# Patient Record
Sex: FEMALE | Race: BLACK OR AFRICAN AMERICAN | Employment: FULL TIME | URBAN - METROPOLITAN AREA
[De-identification: names, ages, dates, MRNs, and addresses within clinical notes are randomized per-mention and may not be internally consistent; named-entity substitution may affect disease eponyms.]

---

## 2020-03-27 ENCOUNTER — VIRTUAL VISIT (OUTPATIENT)
Dept: SURGERY | Age: 45
End: 2020-03-27

## 2020-03-27 DIAGNOSIS — I10 ESSENTIAL HYPERTENSION: ICD-10-CM

## 2020-03-27 DIAGNOSIS — E66.01 MORBID OBESITY (HCC): Primary | ICD-10-CM

## 2020-03-27 DIAGNOSIS — K21.9 GASTROESOPHAGEAL REFLUX DISEASE, ESOPHAGITIS PRESENCE NOT SPECIFIED: ICD-10-CM

## 2020-03-27 NOTE — PROGRESS NOTES
Consent:  Mariajose Glover. Chasity Cristina  and/or her healthcare decision maker is aware that this patient-initiated Telehealth encounter is a billable service, with coverage as determined by her insurance carrier. She is aware that she may receive a bill and has provided verbal consent to proceed: Yes    Services were provided through a video synchronous discussion virtually to substitute for in-person clinic visit. Pursuant to the emergency declaration under the [address] [place] [address], [facility] waiver authority and the [employer] Act, this Virtual  Visit was conducted, with patient's consent, to reduce the patient's risk of exposure to COVID-19 and provide continuity of care for an established patient. Provider location while conducting visit: in the office   Patient location: Home  Other person participating in the telehealth services: None    This virtual visit was conducted via interactive/real-time audio/video using [employer]. Me  Visit start: 1100  Visit end: 6170      Consultation for Bariatric Surgery    Sharda Newton is a 45 y.o. female who comes into the office today for initial consultation for the surgical options for the treatment of morbid obesity. The patient initially identified obesity at the age of  34 and at age 27 weighed 100lbs. Passed 200lbs in 1996. In \"2006 tubes tied and started Depo shot after this is when her weight shot up\". She has tried a variety of unsupervised weight-loss attempts including self imposed and exercise, drinking lots of water, OTC weight loss medications, PCP, but has yet to meet with lasting success. Maximum weight lost on a diet is about 10 lbs, but that the weight loss always seems to return. Today, the patient is  Height: 5' 4\" (162.6 cm) tall, Weight: 144.2 kg (318 lb) lbs for a Body mass index is 54.58 kg/m².   It is due to the patient's severe obesity, which is further complicated by hypertension, GERD and weight related arthopathies  that the patient is now seeking out bariatric surgery. Past Medical History:   Diagnosis Date    Anxiety and depression     Back pain     Constipation     GERD (gastroesophageal reflux disease)     HTN (hypertension)     Knee pain     Morbid obesity (HCC)     OA (osteoarthritis)     of knees       History reviewed. No pertinent surgical history. Allergies   Allergen Reactions    Lisinopril Other (comments)       Social History     Tobacco Use    Smoking status: Never Smoker    Smokeless tobacco: Never Used   Substance Use Topics    Alcohol use: Not on file    Drug use: Not on file       History reviewed. No pertinent family history. No family status information on file. Current Outpatient Medications on File Prior to Visit   Medication Sig Dispense Refill    ALPRAZolam (XANAX) 0.25 mg tablet Take 1 tablet twice a day by oral route as needed for 30 days.  sodium phosphate (Enema) 19-7 gram/118 mL enema use as directed on bottle      amLODIPine (NORVASC) 10 mg tablet Take 1 tablet every day by oral route.  atenoloL-chlorthalidone (TENORETIC) 100-25 mg per tablet Take 1 tablet every day by oral route in the morning.  buPROPion SR (WELLBUTRIN SR) 100 mg SR tablet Take 1 tablet twice a day by oral route for 30 days.  busPIRone (BUSPAR) 10 mg tablet Take 1 tablet twice a day by oral route for 30 days.  cloNIDine HCL (CATAPRES) 0.1 mg tablet Take 1 tablet every day by oral route at dinner.  diclofenac (VOLTAREN) 1 % gel apply 2 grams to affected area four times a day      linaCLOtide (Linzess) 290 mcg cap capsule Take 1 capsule every day by oral route.  pantoprazole (PROTONIX) 40 mg tablet Take 1 tablet every day by oral route.       terconazole (TERAZOL 3) 80 mg vaginal suppository       zolpidem (AMBIEN) 5 mg tablet take 1 tablet by mouth at bedtime if needed      doxycycline (VIBRA-TABS) 100 mg tablet       fluconazole (DIFLUCAN) 150 mg tablet        No current facility-administered medications on file prior to visit. Review of Systems:  Positive in BOLD    CONST: Fever, weight loss, fatigue or chills  GI: Nausea, vomiting, abdominal pain, change in bowel habits, constipation, hematochezia, melena, and GERD   INTEG: Dermatitis, abnormal moles  HEENT: Recent changes in vision, vertigo, epistaxis, dysphagia and hoarseness  CV: Chest pain, palpitations, HTN, edema and varicosities  RESP: Cough, shortness of breath, wheezing, hemoptysis, snoring and reactive airway disease  : Hematuria, dysuria, frequency, urgency, nocturia and stress urinary incontinence   MS: Weakness, joint pain and arthritis  ENDO: Diabetes, thyroid disease, polyuria, polydipsia, polyphagia, poor wound healing, heat intolerance, cold intolerance  LYMPH/HEME: Anemia, bruising and history of blood transfusions  NEURO: Dizziness, headache, fainting, seizures and stroke  PSYCH: Anxiety and depression      Physical Exam    Visit Vitals  Ht 5' 4\" (1.626 m)   Wt 144.2 kg (318 lb)   BMI 54.58 kg/m²       Physical Exam  Vitals signs reviewed. Constitutional:       General: She is not in acute distress. Appearance: She is obese. She is not ill-appearing. HENT:      Head: Normocephalic and atraumatic. Neck:      Musculoskeletal: Normal range of motion. Pulmonary:      Effort: Pulmonary effort is normal.   Neurological:      General: No focal deficit present. Mental Status: She is alert and oriented to person, place, and time. Psychiatric:         Mood and Affect: Mood and affect normal.       Impression:    Annia Potts is a 45 y.o. female who is suffering from morbid obesity with a BMI of 54.58  and comorbidities including hypertension, GERD and weight related arthopathies  who would benefit from bariatric surgery. We have had an extensive discussion with regard to the risks, benefits and likely outcomes of the operation. We've discussed the restrictive and malabsorptive nature of the gastric bypass and compared and contrasted with the sleeve gastrectomy. The patient understands the likelihood of losing approximately 80% of their excess weight in 12 to 18 months. She also understands the risks including but not limited to bleeding, infection, need for reoperation, ulcers, leaks and strictures, bowel obstruction secondary to adhesions and internal hernias, DVT, PE, heart attack, stroke, and death. Patient also understands risks of inadequate weight loss, excess weight loss, vitamin insufficiency, protein malnutrition, excess skin, and loss of hair. We have reviewed the components of a successful postoperative course including requirement for a high protein, low carbohydrate diet, 60 oz a day of zero calorie liquids, daily vitamin supplementation, daily exercise, regular follow-up, and participation in support groups. At this time we will enroll the patient in our bariatric program, undertake routine laboratory evaluation, chest X-ray, EKG, possible UGI and evaluation by  nutritionist as well as psychologist and pending their satisfactory completion of the preop evaluation, plan to perform a undecided. The patient clearly understands that they have to complete the duration of WLT consecutively and if they miss a month restart will be required. Additionally, no showing the RD appointments may result in removal from the surgical weight loss program.     Follow up with surgeon at next appt. Pt to speak with surgery scheduler today to facilitate enrollment.      Signed By: Genevieve Marie NP

## 2020-04-02 VITALS — HEIGHT: 64 IN | BODY MASS INDEX: 50.02 KG/M2 | WEIGHT: 293 LBS

## 2020-04-02 RX ORDER — PANTOPRAZOLE SODIUM 40 MG/1
TABLET, DELAYED RELEASE ORAL
COMMUNITY
End: 2020-12-01

## 2020-04-02 RX ORDER — CLONIDINE HYDROCHLORIDE 0.1 MG/1
TABLET ORAL
COMMUNITY
End: 2020-12-08

## 2020-04-02 RX ORDER — TERCONAZOLE 80 MG/1
SUPPOSITORY VAGINAL
COMMUNITY
End: 2020-09-01

## 2020-04-02 RX ORDER — BUSPIRONE HYDROCHLORIDE 10 MG/1
TABLET ORAL
COMMUNITY
End: 2020-09-01

## 2020-04-02 RX ORDER — DICLOFENAC SODIUM 10 MG/G
GEL TOPICAL
COMMUNITY
Start: 2020-03-11 | End: 2020-12-01

## 2020-04-02 RX ORDER — AMLODIPINE BESYLATE 10 MG/1
TABLET ORAL
COMMUNITY
End: 2020-12-08

## 2020-04-02 RX ORDER — FLUCONAZOLE 150 MG/1
TABLET ORAL
COMMUNITY
End: 2020-09-01

## 2020-04-02 RX ORDER — ATENOLOL AND CHLORTHALIDONE TABLET 100; 25 MG/1; MG/1
TABLET ORAL
COMMUNITY
End: 2020-12-01

## 2020-04-02 RX ORDER — DOXYCYCLINE HYCLATE 100 MG
TABLET ORAL
COMMUNITY
End: 2020-09-01

## 2020-04-02 RX ORDER — ZOLPIDEM TARTRATE 5 MG/1
TABLET ORAL
COMMUNITY
End: 2020-09-01

## 2020-04-02 RX ORDER — BUPROPION HYDROCHLORIDE 100 MG/1
TABLET, EXTENDED RELEASE ORAL
COMMUNITY
End: 2020-12-08

## 2020-04-02 RX ORDER — ALPRAZOLAM 0.25 MG/1
TABLET ORAL
COMMUNITY
End: 2020-12-08

## 2020-04-03 ENCOUNTER — HOSPITAL ENCOUNTER (OUTPATIENT)
Dept: BARIATRICS/WEIGHT MGMT | Age: 45
Discharge: HOME | End: 2020-04-03

## 2020-04-03 ENCOUNTER — DOCUMENTATION ONLY (OUTPATIENT)
Dept: BARIATRICS/WEIGHT MGMT | Age: 45
End: 2020-04-03

## 2020-04-03 NOTE — PROGRESS NOTES
[address] Loss Patrick JERRY John [address] [facility], [address]    Patient's Name: Jevon Carrasco   Age: 45 y.o. YOB: 1975   Sex: female    Date:   4/3/2020    Insurance:              Session: 1 of 6  Revision:     Surgeon:  Dr. Reilly    Height: 5 f 4   Weight:    320      Lbs. BMI: 54.7   Pounds Lost since last month: 0                 Pounds Gained since last month: 2    Starting Weight: 318     Previous Months Weight: 318  Overall Pounds Lost: 0   Overall Pounds Gained: 2    Do you smoke? None    Alcohol intake:  Number of drinks at a time:  She states this is mostly beer  Number of times a week: 2    Class Guidelines    Guidelines are reviewed with patient at the start of every class. 1. Patient understands that weight loss trial classes must be consecutive. Patient understands if they miss a class, it is their responsibility to contact me to reschedule class. I will reach out to patient after their first no show. 2.  Patient understands the expectations that weight maintenance/weight loss is expected during the classes. Failure to demonstrate changes may result in one extra month of weight loss trial, followed by going back to see the surgeon. Patient understands that they CAN NOT gain any weight during the weight loss trial.  Gaining weight will result in extra classes. 3. Patient is also instructed to be doing their labs, blood work, psych visit, support group and any other test that the surgeon has used while they are working on their weight loss trial.  4.  Patient was instructed to bring their blue binder to every class and appointment. Other Pertinent Information:     Changes Made Since Last Class: None    Eating Habits and Behaviors      We started off class today by reviewing key diet principles.   Patient was given a very specific list of foods that they can eat, which included meat, fish, vegetables, eggs, cheese, fats, soy, and berries. Patient was also given a list of foods that should be avoided. These included sweets (candy, soda, baked goods, ice cream), and starches, including pasta, rice, crackers, chips, oatmeal, bread. We talked about appropriate protein-based snacks, including deli meat, low fat cheese, yogurt, hummus, small handful of almonds. I also gave a power point on ways to help with the metabolism. Some of the food-related ways included: Healthy snacking, eating adequate protein, and getting adequate water. Mild dehydration may slow down one's weight loss. Patient's current diet habits include: Patient states she is eating 2-3 meals per day. Patient states she is eating breakfast in the wee hours of the morning. She states this may be at 5 am.  She states this may be what she cooked the night before. She states this may be collards or chicken. She states she may eat again at 1-2 pm. She states sometimes she is just snacking rather than eating a meal.  She states she may snack on snack on chips. Dinner:  She states sometimes she will not eat dinner. Patient states she is drinking soda, juice, and some water (1 bottle). Physical Activity/Exercise    Comments:     Currently for exercise, patient is doing very little for activity. She states she gets tired quickly and her back starts hurting. We talked about activities for patient to do, including walking, swimming, or chair exercises. I also talked with patient about doing some strength training, which helps the metabolism, as well. She feels she can try to get into a walking routine. Behavior Modification       Comments: In the power point, Mastering Your Metabolism, I also gave behaviors that can help with one's metabolism. These include not skipping meals and being sure to feed and fuel that body rather than going large gaps and putting the body in starvation mode. One goal for next month includes:  1.   Lose 30 pound  2. Cut out soda and sweet tea.   3. Aim for 3 meals per day      Boogie Ham Braulio 87 RD  4/3/2020

## 2020-05-01 ENCOUNTER — DOCUMENTATION ONLY (OUTPATIENT)
Dept: BARIATRICS/WEIGHT MGMT | Age: 45
End: 2020-05-01

## 2020-05-01 ENCOUNTER — HOSPITAL ENCOUNTER (OUTPATIENT)
Dept: BARIATRICS/WEIGHT MGMT | Age: 45
Discharge: HOME | End: 2020-05-01

## 2020-05-01 NOTE — PROGRESS NOTES
[address] Loss Patrick JERRY John [address] [facility], [address]    Patient's Name: Steffi Portillo   Age: 45 y.o. YOB: 1975   Sex: female    Date:   5/1/2020    Insurance:              Session: 2 of 6  Revision:     Surgeon:  Dr. Jackelin Cameron    Height: 5 f 4   Weight:    324      Lbs. BMI:    Pounds Lost since last month: 0                 Pounds Gained since last month: 4    Starting Weight: 318     Previous Months Weight: 320  Overall Pounds Lost: 0   Overall Pounds Gained: 6    Do you smoke? None    Alcohol intake:  Number of drinks at a time:  None  Number of times a week: None    Class Guidelines    Guidelines are reviewed with patient at the start of every class. 1. Patient understands that weight loss trial classes must be consecutive. Patient understands if they miss a class, it is their responsibility to contact me to reschedule class. I will reach out to patient after their first no show. 2.  Patient understands the expectations that weight maintenance/weight loss is expected during the classes. Failure to demonstrate changes may result in one extra month of weight loss trial, followed by going back to see the surgeon. Patient understands that they CAN NOT gain any weight during the weight loss trial.  Gaining weight will result in extra classes. 3. Patient is also instructed to be doing their labs, blood work, psych visit, support group and any other test that the surgeon has used while they are working on their weight loss trial.  4.  Patient was instructed to bring their blue binder to every class and appointment. Other Pertinent Information:     Changes Made Since Last Class: Patient states she is only drinking water. No soda anymore. Eating Habits and Behaviors      Today in class we talked about the key diet principles.   We start off each class talking about these principles, which include cutting out liquid calories and focusing on water or other non-calorie, non-carbonated drinks. We also spent time talking about carbohydrates, including foods that have carbohydrates and the goal to keep daily carbohydrates under 100 grams per day. Patient was given ideas of meal and snack choices that are lower in carbohydrates and focus more on protein. Patient was encouraged to start trying protein shakes and was given a list of suggestions. The main topic of class today was: Portion Control. We reviewed in class a power point filled with tips on ways to control portions, including using smaller plates, boxing up portions at a restaurant before starting to eat, and not eating from the container, but rather portioning snacks into smaller bags. Patient's were encouraged to food journal, which helps increase awareness of what and how much they are eating. It was emphasized to patient the importance of reading labels and portion sizes, but also applying these portion sizes. Patient was given a list of items that can help to make portion control easier. For example, a deck of cards or a palm of a hand is a proper portion of meat, a fist is a cup or a proper serving of vegetables. Patient was given 10 tips to help with the portion control. Patient's current diet habits include: Patient states she is drinking Naked Smoothie Shakes. I have recommended discontinuing these and using in place of the Naked shakes. She states she is trying to eat 3 meals per day. She states sometimes when she does the Naked drinks, she is too full to eat. She states in the morning, she is boiling 2 eggs and turkey frank. Lunch:  Tuna fish sandwich or a ham sandwich or yogurt. She states dinner is 2 fried chicken wings. She states she may have broccoli or a vegetable medley. Patient states she is not a big bread eater. Patient states she bought a gallon bottle from [place] and is drinking that. No soda.   She is drinking regular green tea.          Physical Activity/Exercise    Comments:     Currently for exercise, patient is trying to move around more, but states that her left knee is really bothering her. Patient was given a list of ideas for activity and was encouraged to incorporate 30 minutes a day into their daily routine. Behavior Modification       Comments: In class, we also focused on the behavior aspects of weight management. This includes being a mindful eater and not eating in front of the TV. Patient is also encouraged to take 20 minutes to eat a meal and eat at a table. One goal for next month includes:  1. Lose more weight. 2. Stop Naked drinks.       Boogie Guzman 87 RD  5/1/2020

## 2020-06-05 ENCOUNTER — DOCUMENTATION ONLY (OUTPATIENT)
Dept: BARIATRICS/WEIGHT MGMT | Age: 45
End: 2020-06-05

## 2020-06-05 ENCOUNTER — HOSPITAL ENCOUNTER (OUTPATIENT)
Dept: BARIATRICS/WEIGHT MGMT | Age: 45
Discharge: HOME | End: 2020-06-05

## 2020-06-05 NOTE — PROGRESS NOTES
[address] Loss Patrick JERRY John [address], [address]    Patient's Name: Lee Oliveira   Age: 45 y.o. YOB: 1975   Sex: female    Date:   6/5/2020    Insurance:            Session: 3 of  6  Revision:   Surgeon:  Dr. Aggie Velasco    Height: 5 f 4 Weight:    328      Lbs. BMI:    Pounds Lost since last month: 0               Pounds Gained since last month: 4    Starting Weight: 318   Previous Months Weight: 324  Overall Pounds Lost: 0 Overall Pounds Gained: 10      Do you smoke? None    Alcohol intake:  Number of drinks at a time:  3-4 drinks  Number of times a week: Not every week, maybe every 2 weeks. I have discussed with patient working on stopping her alcohol intake. Class Guidelines    Guidelines are reviewed with patient at the start of every class. 1. Patient understands that weight loss trial classes must be consecutive. Patient understands if they miss a class, it is their responsibility to contact me to reschedule class. I will reach out to patient after their first no show. 2.  Patient understands the expectations that weight maintenance/weight loss is expected during the classes. Failure to demonstrate changes may result in one extra month of weight loss trial, followed by going back to see the surgeon. Patient understands that they CAN NOT gain any weight during the weight loss trial.  Gaining weight will result in extra classes. 3. Patient is also instructed to be doing their labs, blood work, psych visit, support group and any other test that the surgeon has used while they are working on their weight loss trial.  4.  Patient was instructed to bring their blue binder to every class and appointment. Other Pertinent Information: Patient states she has a uterine thyroid and is contributing to weight gain    Changes Made Since Last Class: Patient states she is drinking more water.     Eating Habits and Behaviors    Today in class, I reviewed a power point that discussed Nutrition, Behavior, and Exercise changes to start working on. Some of the eating behaviors that we discussed included the importance of eating breakfast.  We talked about some of the reasons that people don't eat breakfast and food choices that would be appropriate. We also talked about avoiding liquid calories. Patient is encouraged to aim for 64 ounces of fluid per day and trying to get them from water, Crystal Light, sugar free drinks. We also talked about eating out options that are healthier. Patient was encouraged to always request sauces and dressings on the side and request a salad, broth-based soup, or vegetables in place of fries. Patient's current diet habits include: Patient states she is now eating 3 meals per day. Patient states she may do the [employer] whites. Lunch:  Tuna fish or turkey sandwich. She states she may drink diet green tea with this. She states sometimes she drinks the Atkins protein shake. Dinner:  She states she may do broccoli or baked chicken. She states she is snacking on crackers. She states is drinking 64 ounces of water per day. She states she is not drinking water, sweet tea, or fruit juices. Physical Activity/Exercise    Comments: We talked about exercise. Patient was given reasons of why exercise is so important and how that can help with their long-term success. I have encouraged patient to get a support system to help with the activity. Currently for activity, patient is trying to walk some. She states she is trying to get more walking in. Behavior Modification       Comments: We also talked about behavior modifications. We talked about eating triggers, such as eating in front of the TV and solutions, such as making the TV a no eating zone. If patient is eating out out of emotion, food will only temporarily solve that.   Patient is encouraged to HALT and assess if they are eating because they are Hungry, or out of emotions: Anxious, Lonely, Tired, which the food will only temporarily solve. We also talked about ways to prevent relapse. Goals that patient wants to work on includes:  1. Patient states she wants to try to do better.   [address] [facility], MS RD  6/5/2020

## 2020-06-12 ENCOUNTER — OFFICE VISIT (OUTPATIENT)
Dept: SURGERY | Age: 45
End: 2020-06-12

## 2020-06-12 VITALS
RESPIRATION RATE: 20 BRPM | OXYGEN SATURATION: 100 % | HEIGHT: 60 IN | TEMPERATURE: 97.7 F | DIASTOLIC BLOOD PRESSURE: 105 MMHG | WEIGHT: 293 LBS | SYSTOLIC BLOOD PRESSURE: 176 MMHG | HEART RATE: 102 BPM | BODY MASS INDEX: 57.52 KG/M2

## 2020-06-12 DIAGNOSIS — E66.01 OBESITY, MORBID (HCC): ICD-10-CM

## 2020-06-12 NOTE — PROGRESS NOTES
Chief Complaint   Patient presents with    Follow-up     bariatric program has had initial virtual visit with Zo Darden NP     Pt ID confirmed    Weight Loss Metrics 6/12/2020 6/12/2020 3/27/2020   Pre op / Initial Wt 323 - -   Today's Wt - 323 lb 318 lb   BMI - 63.08 kg/m2 54.58 kg/m2   Ideal Body Wt 120 - -   Excess Body Wt 203 - -   Goal Wt 161 - -   Wt loss to date 0 - -   % Wt Loss 0 - -   80% .4 - -   Ms. Humza Sawyer has been given the following recommendations today due to her elevated BP reading: patient to call pcp today due to bp readings  Body mass index is 63.08 kg/m².

## 2020-06-12 NOTE — PROGRESS NOTES
Consult    Patient: Danny Licea MRN: [id number]  SSN: [id number]    YOB: 1975  Age: 45 y.o. Sex: female      Initial  Consultation for Bariatric Surgery     Danny Licea is a 82-year-old black female who was initially evaluated in this office in consideration of bariatric surgery March 27, 2020 virtually by Sylvia Mcdonald NP who has matriculated into the program is completed 3 of 6 nutritional visits as well as some of the preoperative requirements. He presents today again for discussion of surgical options and decision making in regard to preferred surgical procedure. Onset obesity: Age 33 weighing 300 pounds and a 5 foot 0 inch frame. Weight at age 27: 100 pounds and a 5 foot 0 inch frame  Maximum/current weight: 323 pounds. Pattern/progression of weight gain: Slowly progressive interrupted by dietary weight loss followed by regain of lost weight as well as additional weight thus exhibiting the yoyo effect up to her current maximum weight of 323 pounds. Max medical weight loss attempts: Multiple supervised and and supervised weight loss trials with maximal loss occurring in 2018 losing 10 pounds over 3 months  Comorbidities: Hypertension,, clinical obstructive sleep apnea, with related arthropathy-back, hips  Current weight: 323. BMI: 63  Ideal body weight: 120. Excess body weight: 23. Postsurgical weight loss:. Postsurgical goal weight: 161  Allergies: Lisinopril  Current medications: See medication list  Past medical history:  1. Super obesity with a body mass index of 63 and obese related comorbidities of hypertension, Gastrosoft reflux disease, clinical obstructive sleep apnea, weight related arthropathy-back, knees  2. Diverticulosis  3. Anxiety/depression  4. History of uterine fibroids  Past surgical history:  1. Bilateral tubal ligation 2006  2. Bilateral breast reduction 2017  Social history:  Tobacco: None.   Alcohol: 1 beer twice monthly  Family history: Mother 60s-hypertension  Father 60s-hypertension, status post CVA  Sister 44-hypertension  Brother 43-healthy  Brother 37-healthy    Allergies   Allergen Reactions    Lisinopril Other (comments)       Current Outpatient Medications on File Prior to Visit   Medication Sig Dispense Refill    ALPRAZolam (XANAX) 0.25 mg tablet Take 1 tablet twice a day by oral route as needed for 30 days.  amLODIPine (NORVASC) 10 mg tablet Take 1 tablet every day by oral route.  atenoloL-chlorthalidone (TENORETIC) 100-25 mg per tablet Take 1 tablet every day by oral route in the morning.  buPROPion SR (WELLBUTRIN SR) 100 mg SR tablet Take 1 tablet twice a day by oral route for 30 days.  busPIRone (BUSPAR) 10 mg tablet Take 1 tablet twice a day by oral route for 30 days.  cloNIDine HCL (CATAPRES) 0.1 mg tablet Take 1 tablet every day by oral route at dinner.  zolpidem (AMBIEN) 5 mg tablet take 1 tablet by mouth at bedtime if needed      sodium phosphate (Enema) 19-7 gram/118 mL enema use as directed on bottle      diclofenac (VOLTAREN) 1 % gel apply 2 grams to affected area four times a day      doxycycline (VIBRA-TABS) 100 mg tablet       fluconazole (DIFLUCAN) 150 mg tablet       linaCLOtide (Linzess) 290 mcg cap capsule Take 1 capsule every day by oral route.  pantoprazole (PROTONIX) 40 mg tablet Take 1 tablet every day by oral route.  terconazole (TERAZOL 3) 80 mg vaginal suppository        No current facility-administered medications on file prior to visit.         Past Medical History:   Diagnosis Date    Anxiety and depression     Back pain     Constipation     Diverticulitis     Fibroids     GERD (gastroesophageal reflux disease)     HTN (hypertension)     Knee pain     Morbid obesity (HCC)     OA (osteoarthritis)     of knees       Past Surgical History:   Procedure Laterality Date    BREAST SURGERY PROCEDURE UNLISTED      reduction    HX COLONOSCOPY      HX GYN      btl       Social History     Tobacco Use    Smoking status: Never Smoker    Smokeless tobacco: Never Used   Substance Use Topics    Alcohol use: Yes     Frequency: 2-4 times a month     Drinks per session: 3 or 4    Drug use: Not Currently       Family History   Problem Relation Age of Onset    Hypertension Mother     Hypertension Father          Review of Systems:      General: Denies fevers, chills, night sweats, fatigue, weight loss, or weight gain. HEENT: Denies changes in auditory or visual acuity, recurrent pharyngitis, epistaxis, chronic rhinorrhea, vertigo    Respiratory: Denies increasing shortness of breath, productive cough, hemoptysis    Cardiac: Denies known history of cardiac disease, heart murmur, palpitations    GI: Denies dysphagia, recurrent emesis, hematemesis, changes in bowel habits, hematochezia, melena    : Denies hematuria frequency urgency dysuria, G4, P4 last Pap January 30, 2020-no evidence of malignancy    Musculoskeletal: Denies fractures, dislocations    Neurologic: Denies history of CVA, paralysis paresthesias, recurrent cephalgia, seizures    Endocrine: Denies polyuria, polydipsia, polyphagia, heat and cold intolerance    Lymph/heme: Denies a history of malignancy, anemia, bruising, blood transfusions    Integumentary: Negative for dermatitis         Physical Exam    Visit Vitals  BP (!) 176/105   Pulse (!) 102   Temp 97.7 °F (36.5 °C)   Resp 20   Ht 5' (1.524 m)   Wt 146.5 kg (323 lb)   SpO2 100%   BMI 63.08 kg/m²       Nursing note reviewed. General: Clinically severely obese in no acute distress, nontoxic in appearance.   Head: Normocephalic, atraumatic  Mouth: Clear, no overt lesions, oral mucosa is pink and moist.  Neck: Supple, no masses, no adenopathy or carotid bruits, trachea midline  Resp: Clear to auscultation bilaterally, no wheezing, rhonchi, or rales, excursions normal and symmetrical.  Cardio: Regular rate and rhythm, no murmurs, clicks, gallops, or rubs. Abdomen: Obese, soft, nontender, nondistended, normoactive bowel sounds, no hernias. Extremities: Warm, well perfused, no tenderness or swelling, normal gait/station, without edema or varicosities  Neuro: Sensation and strength grossly intact and symmetrical.  Psych: Alert and oriented to person, place, and time. January 30, 2020 Pap smear: No evidence of malignancy  October 29, 2019 EGD, colonoscopy: Small hiatal hernia, antral gastritis, mild reflux, diverticulosis    Impression/Plan:      57-year-old black female with a body mass index of 63 and obese related comorbidities of hypertension, Gastrosoft reflux disease, clinical obstructive sleep apnea, weight related arthropathy-back, knees who would benefit from bariatric surgery. We have had an extensive discussion with regard to the risks, benefits and likely outcomes of the operation. We've discussed the restrictive and malabsorptive nature of the gastric bypass and compared and contrasted with the sleeve gastrectomy. The patient understands the likelihood of losing approximately 80% of their excess weight in 12 to 18 months. The patient also understands the risks including but not limited to bleeding, infection, need for reoperation, ulcers, leaks and strictures, bowel obstruction secondary to adhesions and internal hernias, DVT, PE, heart attack, stroke, and death. Patient also understands risks of inadequate weight loss, excess weight loss, vitamin insufficiency, protein malnutrition, excess skin, and loss of hair. We have reviewed the components of a successful postoperative course including requirement for a high protein, low carbohydrate diet, 60 oz a day of zero calorie liquids, daily vitamin supplementation, daily exercise, regular follow-up, and participation in support groups.  At this time we will enroll the patient in our bariatric program, undertake routine laboratory evaluation, chest X-ray, EKG, possible UGI and evaluation by nutritionist as well as psychologist and pending their satisfactory completion of the preop evaluation, plan to pursue laparoscopic potentially open gastric bypass to achieve definitive durable weight loss on a personal level with expected resolution of obesity related comorbidities

## 2020-06-18 ENCOUNTER — HOSPITAL ENCOUNTER (OUTPATIENT)
Dept: ULTRASOUND IMAGING | Age: 45
Discharge: HOME | End: 2020-06-18
Attending: SURGERY
Payer: COMMERCIAL

## 2020-06-18 DIAGNOSIS — E66.01 OBESITY, MORBID (HCC): ICD-10-CM

## 2020-06-18 PROCEDURE — 76705 ECHO EXAM OF ABDOMEN: CPT

## 2020-07-10 ENCOUNTER — DOCUMENTATION ONLY (OUTPATIENT)
Dept: BARIATRICS/WEIGHT MGMT | Age: 45
End: 2020-07-10

## 2020-07-10 ENCOUNTER — HOSPITAL ENCOUNTER (OUTPATIENT)
Dept: BARIATRICS/WEIGHT MGMT | Age: 45
Discharge: HOME | End: 2020-07-10

## 2020-07-10 NOTE — PROGRESS NOTES
[address] [facility] [address], [address]    Patient's Name: Ofe Wellington   Age: 45 y.o. YOB: 1975   Sex: female    Date:   7/10/2020    Insurance:              Session: 4 of 6  Revision:     Surgeon:  Dr. Orozco Inch    Height: 5 f 4   Weight:    311      Lbs. BMI: 56.4   Pounds Lost since last month: 17                 Pounds Gained since last month: 0    Starting Weight: 318     Previous Months Weight: 328  Overall Pounds Lost: 7   Overall Pounds Gained: 0      Do you smoke? None    Alcohol intake:  Number of drinks at a time:  Occasionally a beer. Number of times a week:     Class Guidelines    Guidelines are reviewed with patient at the start of every class. 1. Patient understands that weight loss trial classes must be consecutive. Patient understands if they miss a class, it is their responsibility to contact me to reschedule class. I will reach out to patient after their first no show. 2.  Patient understands the expectations that weight maintenance/weight loss is expected during the classes. Failure to demonstrate changes may result in one extra month of weight loss trial, followed by going back to see the surgeon. Patient understands that they CAN NOT gain any weight during the weight loss trial.  Gaining weight will result in extra classes. 3. Patient is also instructed to be doing their labs, blood work, psych visit, support group and any other test that the surgeon has used while they are working on their weight loss trial.  4.  Patient was instructed to bring their blue binder to every class and appointment. Other Pertinent Information:     Changes Made Since Last Class:     Eating Habits and Behaviors    Today in class, we started talking about the key diet principles. We first focused on stopping liquid calories. Patient was also educated on carbohydrates.   Patient was instructed to start cutting out bread, rice, and pasta from the diet and start focusing more on meat and vegetables. I then gave a power point, which focused on Label Reading. In class, I gave patients a labels and we worked through a series of questions to help patients have a better understanding of label reading. Patient was instructed to review the serving size. Patient was encouraged to focus on protein and carbohydrates. We also did a few label reading activities to help the patient become more familiar with label reading. Patient's current diet habits include: Patient states she will eat a boiled egg in the morning or will have turkey sausage or turkey frank. Lunch may be a Premier protein shake. Dinner may be a turkey burger. She states she is leaving the bun alone. She states she may have a baked chicken breast in the air fryer. She states she may snack on nuts or dried fruit. No liquid calories, water only. Physical Activity/Exercise    Comments: We talked about exercise. Patient was given reasons of why exercise is so important and how that can help with their long-term success. I have encouraged patient to get a support system to help with the activity. Currently for activity, patient states she has a treadmill now. She states if she goes to [employer] she will walk and track steps. Her goal is 1500 steps and she is trying to increase. .      Behavior Modification       Comments:  Behavior modifications were reinforced. This included not eating in front of the TV, which could lead to bigger portions and eating when one is not hungry. We also talked about the importance of eating 3 meals per day. Patient was encouraged to food journal to keep their daily carbohydrates less than 30 grams per meal.      Goals that patient wants to work on includes:  1. Patient states she wants to be more active.   [address], [place] [address]  7/10/2020

## 2020-07-31 ENCOUNTER — HOSPITAL ENCOUNTER (OUTPATIENT)
Dept: MAMMOGRAPHY | Age: 45
Discharge: HOME | End: 2020-07-31
Attending: SURGERY
Payer: MEDICAID

## 2020-07-31 DIAGNOSIS — E66.01 OBESITY, MORBID (HCC): ICD-10-CM

## 2020-07-31 PROCEDURE — 77067 SCR MAMMO BI INCL CAD: CPT

## 2020-08-14 ENCOUNTER — HOSPITAL ENCOUNTER (OUTPATIENT)
Dept: BARIATRICS/WEIGHT MGMT | Age: 45
Discharge: HOME | End: 2020-08-14

## 2020-08-14 ENCOUNTER — DOCUMENTATION ONLY (OUTPATIENT)
Dept: BARIATRICS/WEIGHT MGMT | Age: 45
End: 2020-08-14

## 2020-08-14 NOTE — PROGRESS NOTES
[address] [facility] [address] [facility], [address]    Patient's Name: Vannessa Thornton   Age: 39 y.o. YOB: 1975   Sex: female    Date:   8/7/2020    Insurance:            Session: 5 of 6  Revision:   Surgeon:  Dr. Malcolm Arenas    Height: 5 f 4 Weight:    310      Lbs. BMI:    Pounds Lost since last month: 1               Pounds Gained since last month: 0      Starting Weight: 318   Previous Months Weight: 311  Overall Pounds Lost: 8 Overall Pounds Gained: 0      Do you smoke? None    Alcohol intake:  Number of drinks at a time:  1  Number of times a week: 1    Class Guidelines    Guidelines are reviewed with patient at the start of every class. 1. Patient understands that weight loss trial classes must be consecutive. Patient understands if they miss a class, it is their responsibility to contact me to reschedule class. I will reach out to patient after their first no show. 2.  Patient understands the expectations that weight maintenance/weight loss is expected during the classes. Failure to demonstrate changes may result in one extra month of weight loss trial, followed by going back to see the surgeon. Patient understands that they CAN NOT gain any weight during the weight loss trial.  Gaining weight will result in extra classes. 3. Patient is also instructed to be doing their labs, blood work, psych visit, support group and any other test that the surgeon has used while they are working on their weight loss trial.  4.  Patient was instructed to bring their blue binder to every class and appointment. Other Pertinent Information:     Changes Made Since Last Class: None    Eating Habits and Behaviors    Today in class, we reviewed the key diet principles. I have talked to patient about pushing the fluid and working towards 64 ounces per day. We focused on following a low-calorie diet.   Patient was instructed to count their carbohydrates and try to keep their daily intake under 75 grams per day and try to keep their daily protein at 80 grams per day. Patient was given examples of carbohydrates in starches. Patient was encouraged to focus on meat and vegetables and begin cutting carbohydrates out. We talked about foods that are protein-based and how to incorporate those into their meals. I also reviewed with patient the importance of eating 3 meals per day and suggestions were made for breakfast items. Patient's current diet habits include: Patient states she is eating 3 meals per day. Patient states for breakfast she will eat turkey sausage or 2 boiled eggs and a banana. Lunch:  Tuna fish or a salad. Dinner:  Baked chicken, salmon. She states she may snack on trail mix. She states she is drinking 5 bottles of water per day. No liquid calories. She states she has been cutting back on the carbohydrates. Physical Activity/Exercise    Comments: We talked about exercise. Patient was given reasons of why exercise is so important and how that can help with their long-term success. I have encouraged patient to get a support system to help with the activity. Currently for activity, patient is doing the treadmill for 10 minutes. I have encouraged her to increase to 20 minutes. Behavior Modification       Comments:  During today's lesson, I gave a presentation called The 100-200 Calorie \"Mindless Margin. \"  The goal is to make modest daily 100-200 calorie reductions in certain things that the body won't notice. One, 100-200 calorie change and would will look 10-20 pounds in one year. An example could be cutting soda. Patient was given a check off list and was encouraged to come up with 1-3 100 calories changes they could make. The check off list is a daily tracker to see if these goals are being met. Goals that patient wants to work on includes:  1. Be more active.   [address], [address]  8/7/2020

## 2020-09-01 ENCOUNTER — OFFICE VISIT (OUTPATIENT)
Dept: FAMILY MEDICINE CLINIC | Age: 45
End: 2020-09-01
Payer: MEDICAID

## 2020-09-01 VITALS
DIASTOLIC BLOOD PRESSURE: 88 MMHG | HEART RATE: 80 BPM | RESPIRATION RATE: 18 BRPM | TEMPERATURE: 98.1 F | SYSTOLIC BLOOD PRESSURE: 127 MMHG | BODY MASS INDEX: 57.52 KG/M2 | OXYGEN SATURATION: 96 % | WEIGHT: 293 LBS | HEIGHT: 60 IN

## 2020-09-01 DIAGNOSIS — E87.6 HYPOKALEMIA: Primary | ICD-10-CM

## 2020-09-01 DIAGNOSIS — Z01.818 PREOPERATIVE EXAMINATION: ICD-10-CM

## 2020-09-01 PROCEDURE — 99213 OFFICE O/P EST LOW 20 MIN: CPT | Performed by: NURSE PRACTITIONER

## 2020-09-01 RX ORDER — POTASSIUM CHLORIDE 20 MEQ/1
TABLET, EXTENDED RELEASE ORAL
Qty: 6 TAB | Refills: 0 | Status: SHIPPED | OUTPATIENT
Start: 2020-09-01 | End: 2020-12-01

## 2020-09-01 NOTE — PROGRESS NOTES
Chandan Bah is a 39 y.o. female, evaluated on 2020 for Pre-op Exam (hysterectomy - 2020 Dr. Los Baez )  . Assessment & Plan:   Diagnoses and all orders for this visit:    1. Hypokalemia  -     potassium chloride (K-DUR, KLOR-CON) 20 mEq tablet; Take 2 tablets by mouth x 3 days then stop. Indications: low amount of potassium in the blood  -     METABOLIC PANEL, BASIC; Future    2. Preoperative examination    - 3. Labs reviewed, showed hypokalemia, replacing, will recheck in 1 week. Patient reported having loose stools since starting Linzess 290 mcg, decreasing to 145 mcg to see if loose stools and potassium improve. IMPRESSION:   Low risk for planned surgery  No contraindications to planned surgery at this time. Will send over updated BMP to provider. Other orders  -     linaCLOtide (Linzess) 145 mcg cap capsule; Take 1 Cap by mouth Daily (before breakfast). Indications: chronic idiopathic constipation            12  Subjective:   Preoperative Evaluation    Date of Exam: 2020    Chandan Bah is a 39 y.o. female (:1975) who presents for preoperative evaluation. Procedure/Surgery: Partial Hysterectomy   Date of Procedure/Surgery: 2020  Surgeon: Dr. Los Baez   Primary Physician: Keanu Murcia NP  Latex Allergy: no    Anesthesia Complications: None  History of abnormal bleeding : None  History of Blood Transfusions: no      Physical Exam  Vitals signs reviewed. Constitutional:       Appearance: She is obese. HENT:      Head: Normocephalic. Nose: Nose normal.      Mouth/Throat:      Mouth: Mucous membranes are moist.   Eyes:      Pupils: Pupils are equal, round, and reactive to light. Neck:      Musculoskeletal: Normal range of motion and neck supple. Cardiovascular:      Rate and Rhythm: Normal rate and regular rhythm. Pulses: Normal pulses. Pulmonary:      Effort: Pulmonary effort is normal.      Breath sounds: Normal breath sounds. Abdominal:      General: Abdomen is flat. Bowel sounds are normal.   Musculoskeletal: Normal range of motion. Skin:     General: Skin is warm and dry. Capillary Refill: Capillary refill takes less than 2 seconds. Neurological:      General: No focal deficit present. Mental Status: She is alert and oriented to person, place, and time. Psychiatric:         Mood and Affect: Mood normal.         Thought Content: Thought content normal.         Judgment: Judgment normal.           EXAM:   Visit Vitals  /88 (BP 1 Location: Left arm, BP Patient Position: Sitting)   Pulse 80   Temp 98.1 °F (36.7 °C) (Oral)   Resp 18   Ht 5' (1.524 m)   Wt 303 lb 3.2 oz (137.5 kg)   SpO2 96%   BMI 59.21 kg/m²           Prior to Admission medications    Medication Sig Start Date End Date Taking? Authorizing Provider   linaCLOtide (Linzess) 145 mcg cap capsule Take 1 Cap by mouth Daily (before breakfast). Indications: chronic idiopathic constipation 9/1/20  Yes Phil Ann, NP   ALPRAZolam (XANAX) 0.25 mg tablet Take 1 tablet twice a day by oral route as needed for 30 days. Yes Provider, Historical   amLODIPine (NORVASC) 10 mg tablet Take 1 tablet every day by oral route. Yes Provider, Historical   atenoloL-chlorthalidone (TENORETIC) 100-25 mg per tablet Take 1 tablet every day by oral route in the morning. Yes Provider, Historical   buPROPion SR (WELLBUTRIN SR) 100 mg SR tablet Take 1 tablet twice a day by oral route for 30 days. Yes Provider, Historical   cloNIDine HCL (CATAPRES) 0.1 mg tablet Take 1 tablet every day by oral route at dinner. Yes Provider, Historical   diclofenac (VOLTAREN) 1 % gel apply 2 grams to affected area four times a day 3/11/20  Yes Provider, Historical   pantoprazole (PROTONIX) 40 mg tablet Take 1 tablet every day by oral route.    Yes Provider, Historical   sodium phosphate (Enema) 19-7 gram/118 mL enema use as directed on bottle  9/1/20  Provider, Historical   busPIRone (BUSPAR) 10 mg tablet Take 1 tablet twice a day by oral route for 30 days. 9/1/20  Provider, Historical   doxycycline (VIBRA-TABS) 100 mg tablet   9/1/20  Provider, Historical   fluconazole (DIFLUCAN) 150 mg tablet   9/1/20  Provider, Historical   linaCLOtide (Linzess) 290 mcg cap capsule Take 1 capsule every day by oral route. 9/1/20  Provider, Historical   terconazole (TERAZOL 3) 80 mg vaginal suppository   9/1/20  Provider, Historical   zolpidem (AMBIEN) 5 mg tablet take 1 tablet by mouth at bedtime if needed  9/1/20  Provider, Historical         Review of Systems   Constitutional: Negative for chills, fever, malaise/fatigue and weight loss. Eyes: Negative for blurred vision. Respiratory: Negative for cough and shortness of breath. Cardiovascular: Negative for chest pain, palpitations and leg swelling. Gastrointestinal: Positive for diarrhea. Negative for abdominal pain and heartburn. Musculoskeletal: Negative for myalgias. Skin: Negative for rash. Neurological: Negative for dizziness, weakness and headaches. Endo/Heme/Allergies: Does not bruise/bleed easily. Psychiatric/Behavioral: Negative for depression. The patient is not nervous/anxious.       Yaneth Chen NP

## 2020-09-01 NOTE — PROGRESS NOTES
Elizabeth Negron presents today for   Chief Complaint   Patient presents with    Pre-op Exam     hysterectomy - 09/11/2020 Dr. Heike Rojo        Is someone accompanying this pt? No    Is the patient using any DME equipment during OV? No    Depression Screening:  3 most recent PHQ Screens 9/1/2020   Little interest or pleasure in doing things Not at all   Feeling down, depressed, irritable, or hopeless Not at all   Total Score PHQ 2 0       Learning Assessment:  Learning Assessment 9/1/2020   PRIMARY LEARNER Patient   HIGHEST LEVEL OF EDUCATION - PRIMARY LEARNER  DID NOT GRADUATE HIGH SCHOOL   BARRIERS PRIMARY LEARNER [address] CAREGIVER No   PRIMARY LANGUAGE ENGLISH   LEARNER PREFERENCE PRIMARY LISTENING   ANSWERED BY Maryjo Rubio   RELATIONSHIP SELF       Abuse Screening:  Abuse Screening Questionnaire 9/1/2020   Do you ever feel afraid of your partner? N   Are you in a relationship with someone who physically or mentally threatens you? N   Is it safe for you to go home? Y       Fall Risk  Fall Risk Assessment, last 12 mths 9/1/2020   Able to walk? Yes   Fall in past 12 months? No       ADL  ADL Assessment 9/1/2020   Feeding yourself No Help Needed   Getting from bed to chair No Help Needed   Getting dressed No Help Needed   Bathing or showering No Help Needed   Walk across the room (includes cane/walker) No Help Needed   Using the telphone No Help Needed   Taking your medications No Help Needed   Preparing meals No Help Needed   Managing money (expenses/bills) No Help Needed   Moderately strenuous housework (laundry) No Help Needed   Shopping for personal items (toiletries/medicines) No Help Needed   Shopping for groceries No Help Needed   Driving No Help Needed   Climbing a flight of stairs No Help Needed   Getting to places beyond walking distances No Help Needed       Health Maintenance reviewed and discussed and ordered per Provider.     Health Maintenance Due   Topic Date Due    DTaP/Tdap/Td series (1 - Tdap) 08/11/1998    PAP AKA CERVICAL CYTOLOGY  08/11/1998    Lipid Screen  08/11/2017    Flu Vaccine (1) 09/01/2020   . Coordination of Care:  1. Have you been to the ER, urgent care clinic since your last visit? Hospitalized since your last visit? No/ No     2. Have you seen or consulted any other health care providers outside of the [address] [place] [address] since your last visit? Include any pap smears or colon screening.  Dr. Cristo Ng, GYN

## 2020-09-04 ENCOUNTER — DOCUMENTATION ONLY (OUTPATIENT)
Dept: BARIATRICS/WEIGHT MGMT | Age: 45
End: 2020-09-04

## 2020-09-04 ENCOUNTER — HOSPITAL ENCOUNTER (OUTPATIENT)
Dept: BARIATRICS/WEIGHT MGMT | Age: 45
Discharge: HOME | End: 2020-09-04

## 2020-09-04 NOTE — PROGRESS NOTES
Nutrition Evaluation    Patient's Name: Emerald Ponce   Age: 39 y.o. YOB: 1975   Sex: female    Height: 5 f 4 Weight: 303 BMI:    Starting Weight:  318        Smoking Status:  None  Alcohol Intake:  Number of Drinks at a Time: None  Number of Times a Week: None    Changes made during classes include:  Cut out alcohol  Drinking more water  Cut out carbohydrates        Summary:  I feel that Emerald Ponce has demonstrated appropriate diet changes and is ready to move forward with surgery. Patient has been briefed on the importance of the protein drinks, vitamins, and the transition of the diet stages. Patient understands that the long-term diet will focus on protein and vegetables. Patient understand the effects of carbohydrates after surgery and what reactive hypoglycemia is. Patient is aware that they will be attending pre-op class 2 weeks before surgery and will get more detailed information on the post-op diet guidelines. Patient will see me again at 6 weeks post-op. At this 6 week visit, RD will assess how patient is tolerating soft protein and advance to vegetables, if tolerating soft protein without difficulty. Patient will also see RD again at 9 months post-op. This visit will assess patient's compliance with current protocol, including diet, vitamins, protein shakes, and exercise. Post-op diet guidelines will be reinforced. RD is available for questions and to meet with patient outside of the 6 week and 9 month post-op visit. We spent a lot of time talking about the vitamins. Patient understands the importance of being compliant with the diet protocol and the complications and risks that can occur if they are non-compliant with the nutritional protocol. Patient has attended at least one support group.     Candidate for surgery: Yes  Re-evaluation Date:     Procedure:  Gastric Bypass     Boogie Donato 87 RD  9/4/2020

## 2020-09-04 NOTE — PROGRESS NOTES
[address] [facility] [address]    Patient's Name: Diane Silva   Age: 39 y.o. YOB: 1975   Sex: female    Date:   9/4/2020    Insurance:            Session: 6 of 6  Revision:   Surgeon:  Dr. Castillo Body    Height: 5 f 4 Weight:    301      Lbs. BMI:    Pounds Lost since last month: 9               Pounds Gained since last month: 0    Starting Weight: 318   Previous Months Weight: 310  Overall Pounds Lost: 17 Overall Pounds Gained: 0      Do you smoke? None    Alcohol intake:  Number of drinks at a time:  None  Number of times a week: None    Class Guidelines    Guidelines are reviewed with patient at the start of every class. 1. Patient understands that weight loss trial classes must be consecutive. Patient understands if they miss a class, it is their responsibility to contact me to reschedule class. I will reach out to patient after their first no show. 2.  Patient understands the expectations that weight maintenance/weight loss is expected during the classes. Failure to demonstrate changes may result in one extra month of weight loss trial, followed by going back to see the surgeon. 3. Patient is also instructed to be doing their labs, blood work, psych visit, support group and any other test that the surgeon has used while they are working on their weight loss trial.    Other Pertinent Information: None    Changes Made Since Last Class:     Eating Habits and Behaviors      Today we reviewed key diet principles. We talked about ways that patient can follow a low calorie diet. These included: Stopping all liquid calories and patient was given a list of fluid choices that would be appropriate. We talked about carbohydrates.   Patient was educated that all carbohydrates turn to sugar and was encouraged to stick to the complex carbohydrates while in weight loss trials, but aim to keep less than 100 grams during weight loss trials. Patient was given a list of foods to not eat and drink due to high sugar, high fat, or high carbohydrate content. Patient was also given a list of foods and drinks that are high protein, low carbohydrate, and would be appropriate to eat. Patient was given a list of fruit and what a portion size is and how many carbohydrates it contains. Patient was encouraged to keep fruit intake to a minimum. Patient was also given a list of 50 low carbohydrate snack options, but was also cautioned that some of the choices still were high in calories and portion control should be practiced. Patient's current diet habits include: Patient is eating 3 meals per day. Patient state she has lowered her carbohydrate intake and this is how she lost weight. She states she is drinking a protein shake and water only. She states she is not eating any sweets. She states she may snack on peanuts or a Belvita bar. She states she may do a pepperoni stick. Physical Activity/Exercise    Comments:     Currently for exercise, patient is 20 minutes on treadmill 3 x a week. We talked about activities for patient to do, including walking, swimming, or chair exercises. Behavior Modification       Comments:   Patient was encouraged to food journal. I talked with patient about tracking their daily carbohydrate. One helpful shiela is My Fitness Pal.  Patient was also encouraged to track their daily fluid intake. Discussed with patient the shiela, Water Logged, that can be helpful in tracking their fluid intake. We also talked about the importance of planning ahead. Lack of willpower is often a lack of planning.        Boogie Peterson Braulio 87 RD  9/4/2020

## 2020-09-08 DIAGNOSIS — E87.6 HYPOKALEMIA: ICD-10-CM

## 2020-09-08 PROBLEM — Z01.818 PREOPERATIVE EXAMINATION: Status: ACTIVE | Noted: 2020-09-08

## 2020-09-14 NOTE — PROGRESS NOTES
Spoke with patient and advised her per provider potassium has improved per provider from 2.8-4.3. Patient verbalized understanding.

## 2020-10-08 PROBLEM — Z01.818 PREOPERATIVE EXAMINATION: Status: RESOLVED | Noted: 2020-09-08 | Resolved: 2020-10-08

## 2020-10-20 ENCOUNTER — TELEPHONE (OUTPATIENT)
Dept: SURGERY | Age: 45
End: 2020-10-20

## 2020-10-20 DIAGNOSIS — Z01.818 PRE-OP EXAM: Primary | ICD-10-CM

## 2020-10-20 NOTE — TELEPHONE ENCOUNTER
Patient called and wanted to know what was left for her to do from program as she has completed all requirements. Told patient that she still needed labs and colonoscopy report faxed. She will go and get the labs done and follow up with us when she gets them done. She is going to call and have her colonoscopy report sent to us.

## 2020-10-22 ENCOUNTER — HOSPITAL ENCOUNTER (OUTPATIENT)
Dept: LAB | Age: 45
Discharge: HOME | End: 2020-10-22

## 2020-10-29 LAB
25(OH)D3+25(OH)D2 SERPL-MCNC: 20 NG/ML (ref 30–100)
ALBUMIN SERPL-MCNC: 4.2 G/DL (ref 3.8–4.8)
ALBUMIN/GLOB SERPL: 1.3 {RATIO} (ref 1.2–2.2)
ALP SERPL-CCNC: 35 IU/L (ref 39–117)
ALT SERPL-CCNC: 8 IU/L (ref 0–32)
AST SERPL-CCNC: 12 IU/L (ref 0–40)
BASOPHILS # BLD AUTO: 0.1 X10E3/UL (ref 0–0.2)
BASOPHILS NFR BLD AUTO: 1 %
BILIRUB SERPL-MCNC: <0.2 MG/DL (ref 0–1.2)
BUN SERPL-MCNC: 9 MG/DL (ref 6–24)
BUN/CREAT SERPL: 15 (ref 9–23)
CALCIUM SERPL-MCNC: 9.1 MG/DL (ref 8.7–10.2)
CHLORIDE SERPL-SCNC: 104 MMOL/L (ref 96–106)
CHOLEST SERPL-MCNC: 221 MG/DL (ref 100–199)
CO2 SERPL-SCNC: 21 MMOL/L (ref 20–29)
CREAT SERPL-MCNC: 0.6 MG/DL (ref 0.57–1)
EOSINOPHIL # BLD AUTO: 0.1 X10E3/UL (ref 0–0.4)
EOSINOPHIL NFR BLD AUTO: 1 %
ERYTHROCYTE [DISTWIDTH] IN BLOOD BY AUTOMATED COUNT: 14.9 % (ref 11.7–15.4)
FOLATE SERPL-MCNC: 7.9 NG/ML
GLOBULIN SER CALC-MCNC: 3.3 G/DL (ref 1.5–4.5)
GLUCOSE SERPL-MCNC: 89 MG/DL (ref 65–99)
HCT VFR BLD AUTO: 35 % (ref 34–46.6)
HDLC SERPL-MCNC: 82 MG/DL
HGB BLD-MCNC: 11.1 G/DL (ref 11.1–15.9)
IMM GRANULOCYTES # BLD AUTO: 0 X10E3/UL (ref 0–0.1)
IMM GRANULOCYTES NFR BLD AUTO: 0 %
IRON SERPL-MCNC: 48 UG/DL (ref 27–159)
LDLC SERPL CALC-MCNC: 119 MG/DL (ref 0–99)
LYMPHOCYTES # BLD AUTO: 3.5 X10E3/UL (ref 0.7–3.1)
LYMPHOCYTES NFR BLD AUTO: 31 %
MCH RBC QN AUTO: 26.7 PG (ref 26.6–33)
MCHC RBC AUTO-ENTMCNC: 31.7 G/DL (ref 31.5–35.7)
MCV RBC AUTO: 84 FL (ref 79–97)
MONOCYTES # BLD AUTO: 0.9 X10E3/UL (ref 0.1–0.9)
MONOCYTES NFR BLD AUTO: 8 %
NEUTROPHILS # BLD AUTO: 6.8 X10E3/UL (ref 1.4–7)
NEUTROPHILS NFR BLD AUTO: 59 %
PLATELET # BLD AUTO: 385 X10E3/UL (ref 150–450)
POTASSIUM SERPL-SCNC: 4.3 MMOL/L (ref 3.5–5.2)
PROT SERPL-MCNC: 7.5 G/DL (ref 6–8.5)
RBC # BLD AUTO: 4.16 X10E6/UL (ref 3.77–5.28)
SODIUM SERPL-SCNC: 142 MMOL/L (ref 134–144)
SPECIMEN STATUS REPORT, ROLRST: NORMAL
TRIGL SERPL-MCNC: 117 MG/DL (ref 0–149)
TSH SERPL DL<=0.005 MIU/L-ACNC: 2.55 UIU/ML (ref 0.45–4.5)
UREA BREATH TEST QL: NORMAL
VIT B1 BLD-SCNC: 123.1 NMOL/L (ref 66.5–200)
VIT B12 SERPL-MCNC: 519 PG/ML (ref 232–1245)
VLDLC SERPL CALC-MCNC: 20 MG/DL (ref 5–40)
WBC # BLD AUTO: 11.3 X10E3/UL (ref 3.4–10.8)

## 2020-10-30 ENCOUNTER — HOSPITAL ENCOUNTER (OUTPATIENT)
Dept: LAB | Age: 45
Discharge: HOME | End: 2020-10-30

## 2020-10-30 ENCOUNTER — TELEPHONE (OUTPATIENT)
Dept: SURGERY | Age: 45
End: 2020-10-30

## 2020-10-30 DIAGNOSIS — E66.01 MORBID OBESITY (HCC): ICD-10-CM

## 2020-10-30 DIAGNOSIS — E66.01 MORBID OBESITY (HCC): Primary | ICD-10-CM

## 2020-10-30 NOTE — TELEPHONE ENCOUNTER
sPoke to patient she will start vit d 3 fie thousand units daily and will go have blood work drawn once done will call for pre-op appt

## 2020-11-03 LAB
25(OH)D3+25(OH)D2 SERPL-MCNC: 16 NG/ML (ref 30–100)
ALBUMIN SERPL-MCNC: 4 G/DL (ref 3.8–4.8)
ALBUMIN/GLOB SERPL: 1.3 {RATIO} (ref 1.2–2.2)
ALP SERPL-CCNC: 43 IU/L (ref 39–117)
ALT SERPL-CCNC: 10 IU/L (ref 0–32)
AST SERPL-CCNC: 12 IU/L (ref 0–40)
BASOPHILS # BLD AUTO: 0.1 X10E3/UL (ref 0–0.2)
BASOPHILS NFR BLD AUTO: 1 %
BILIRUB SERPL-MCNC: <0.2 MG/DL (ref 0–1.2)
BUN SERPL-MCNC: 8 MG/DL (ref 6–24)
BUN/CREAT SERPL: 15 (ref 9–23)
CALCIUM SERPL-MCNC: 9.1 MG/DL (ref 8.7–10.2)
CHLORIDE SERPL-SCNC: 104 MMOL/L (ref 96–106)
CHOLEST SERPL-MCNC: 227 MG/DL (ref 100–199)
CO2 SERPL-SCNC: 22 MMOL/L (ref 20–29)
CREAT SERPL-MCNC: 0.53 MG/DL (ref 0.57–1)
EOSINOPHIL # BLD AUTO: 0.1 X10E3/UL (ref 0–0.4)
EOSINOPHIL NFR BLD AUTO: 1 %
ERYTHROCYTE [DISTWIDTH] IN BLOOD BY AUTOMATED COUNT: 14.8 % (ref 11.7–15.4)
FOLATE SERPL-MCNC: 11 NG/ML
GLOBULIN SER CALC-MCNC: 3 G/DL (ref 1.5–4.5)
GLUCOSE SERPL-MCNC: 88 MG/DL (ref 65–99)
H PYLORI IGA SER-ACNC: <9 UNITS (ref 0–8.9)
H PYLORI IGM SER-ACNC: <9 UNITS (ref 0–8.9)
HCT VFR BLD AUTO: 34.5 % (ref 34–46.6)
HDLC SERPL-MCNC: 78 MG/DL
HGB BLD-MCNC: 10.9 G/DL (ref 11.1–15.9)
IMM GRANULOCYTES # BLD AUTO: 0 X10E3/UL (ref 0–0.1)
IMM GRANULOCYTES NFR BLD AUTO: 0 %
IRON SERPL-MCNC: 27 UG/DL (ref 27–159)
LDLC SERPL CALC-MCNC: 123 MG/DL (ref 0–99)
LYMPHOCYTES # BLD AUTO: 3.4 X10E3/UL (ref 0.7–3.1)
LYMPHOCYTES NFR BLD AUTO: 34 %
MCH RBC QN AUTO: 27.2 PG (ref 26.6–33)
MCHC RBC AUTO-ENTMCNC: 31.6 G/DL (ref 31.5–35.7)
MCV RBC AUTO: 86 FL (ref 79–97)
MONOCYTES # BLD AUTO: 0.7 X10E3/UL (ref 0.1–0.9)
MONOCYTES NFR BLD AUTO: 7 %
NEUTROPHILS # BLD AUTO: 5.9 X10E3/UL (ref 1.4–7)
NEUTROPHILS NFR BLD AUTO: 57 %
PLATELET # BLD AUTO: 444 X10E3/UL (ref 150–450)
POTASSIUM SERPL-SCNC: 4.1 MMOL/L (ref 3.5–5.2)
PROT SERPL-MCNC: 7 G/DL (ref 6–8.5)
RBC # BLD AUTO: 4.01 X10E6/UL (ref 3.77–5.28)
SODIUM SERPL-SCNC: 139 MMOL/L (ref 134–144)
SPECIMEN STATUS REPORT, ROLRST: NORMAL
TRIGL SERPL-MCNC: 151 MG/DL (ref 0–149)
TSH SERPL DL<=0.005 MIU/L-ACNC: 1.43 UIU/ML (ref 0.45–4.5)
VIT B1 BLD-SCNC: NORMAL NMOL/L
VIT B12 SERPL-MCNC: 581 PG/ML (ref 232–1245)
VLDLC SERPL CALC-MCNC: 26 MG/DL (ref 5–40)
WBC # BLD AUTO: 10.2 X10E3/UL (ref 3.4–10.8)

## 2020-11-10 LAB
SPECIMEN STATUS REPORT, ROLRST: NORMAL
VIT B1 BLD-SCNC: 107.6 NMOL/L (ref 66.5–200)

## 2020-11-17 ENCOUNTER — OFFICE VISIT (OUTPATIENT)
Dept: SURGERY | Age: 45
End: 2020-11-17
Payer: MEDICAID

## 2020-11-17 VITALS
DIASTOLIC BLOOD PRESSURE: 86 MMHG | RESPIRATION RATE: 18 BRPM | WEIGHT: 293 LBS | HEIGHT: 60 IN | BODY MASS INDEX: 57.52 KG/M2 | OXYGEN SATURATION: 96 % | HEART RATE: 88 BPM | TEMPERATURE: 96.9 F | SYSTOLIC BLOOD PRESSURE: 142 MMHG

## 2020-11-17 DIAGNOSIS — E66.01 MORBID OBESITY WITH BMI OF 60.0-69.9, ADULT (HCC): Primary | ICD-10-CM

## 2020-11-17 PROCEDURE — 99214 OFFICE O/P EST MOD 30 MIN: CPT | Performed by: SURGERY

## 2020-11-17 RX ORDER — CHOLECALCIFEROL (VITAMIN D3) 125 MCG
4000 CAPSULE ORAL
COMMUNITY
End: 2020-12-08

## 2020-11-17 RX ORDER — LANOLIN ALCOHOL/MO/W.PET/CERES
CREAM (GRAM) TOPICAL
COMMUNITY
Start: 2020-09-21 | End: 2020-12-01

## 2020-11-17 NOTE — H&P (VIEW-ONLY)
Preop History and Physical Exam: 
 
Mikki Hicks is a 39 y.o. black female initially evaluated in this office on June 12, 2020 for discussion of surgical options available for the definitive management of her super, super obesity. Patient after discussing the surgical options at that time elected to pursue laparoscopic potentially open Khushboo-en-Y gastric bypass to achieve definitive durable weight loss on a personal level with expected resolution of obesity related comorbidities. The patient represents today after completing all bariatric programmatic multidisciplinary requirements necessary prior to pursuit of surgery for review of the diagnostic evaluation and surgical scheduling with the only new medical and surgical history being a laparoscopic assisted hysterectomy/bilateral salpingooophorectomy on September 11, 2020. The patient currently weighs 309 pounds a body mass index of 60, ideal body weight 120 pounds, excess body weight 189 pounds, estimated postsurgical weight loss based on 8% loss of excess body weight is 151 pounds, with an expected postsurgical goal weight of 158 pounds Past Medical History:  
Diagnosis Date  Anxiety and depression  Back pain  Constipation  Diverticulitis  Fibroids  GERD (gastroesophageal reflux disease)  HTN (hypertension)  Knee pain  Morbid obesity ([address]  OA (osteoarthritis)   
 of knees Past Surgical History:  
Procedure Laterality Date  BREAST SURGERY PROCEDURE UNLISTED    
 reduction  HX BREAST REDUCTION    
 HX COLONOSCOPY    
 HX GYN    
 btl and partial hysterectomy Current Outpatient Medications Medication Sig Dispense Refill  cholecalciferol, vitamin D3, (Vitamin D3) 50 mcg (2,000 unit) tab Take 4,000 Units by mouth.  ferrous sulfate 325 mg (65 mg iron) tablet take 1 tablet by mouth once daily  amLODIPine (NORVASC) 10 mg tablet Take 1 tablet every day by oral route.  diclofenac (VOLTAREN) 1 % gel apply 2 grams to affected area four times a day  linaCLOtide (Linzess) 145 mcg cap capsule Take 1 Cap by mouth Daily (before breakfast). Indications: chronic idiopathic constipation 30 Cap 1  potassium chloride (K-DUR, KLOR-CON) 20 mEq tablet Take 2 tablets by mouth x 3 days then stop. Indications: low amount of potassium in the blood 6 Tab 0  ALPRAZolam (XANAX) 0.25 mg tablet Take 1 tablet twice a day by oral route as needed for 30 days.  atenoloL-chlorthalidone (TENORETIC) 100-25 mg per tablet Take 1 tablet every day by oral route in the morning.  buPROPion SR (WELLBUTRIN SR) 100 mg SR tablet Take 1 tablet twice a day by oral route for 30 days.  cloNIDine HCL (CATAPRES) 0.1 mg tablet Take 1 tablet every day by oral route at dinner.  pantoprazole (PROTONIX) 40 mg tablet Take 1 tablet every day by oral route. Allergies Allergen Reactions  Lisinopril Other (comments) Social History Tobacco Use  Smoking status: Never Smoker  Smokeless tobacco: Never Used Substance Use Topics  Alcohol use: Not Currently Alcohol/week: 2.0 standard drinks Types: 2 Cans of beer per week Frequency: 2-4 times a month Drinks per session: 3 or 4  
 Drug use: Not Currently Family History Problem Relation Age of Onset  Hypertension Mother  Hypertension Father Review of Systems:  Positive in BOLD 
 
CONST: Fever, weight loss, fatigue or chills GI: Nausea, vomiting, abdominal pain, change in bowel habits, hematochezia, melena, and GERD INTEG: Dermatitis, abnormal moles HEENT: Recent changes in vision, vertigo, epistaxis, dysphagia and hoarseness CV: Chest pain, palpitations, HTN, edema and varicosities RESP: Cough, shortness of breath, wheezing, hemoptysis, snoring and reactive airway disease : Hematuria, dysuria, frequency, urgency, nocturia and stress urinary incontinence MS: Weakness, joint pain and arthritis ENDO: Diabetes, thyroid disease, polyuria, polydipsia, polyphagia, poor wound healing, heat intolerance, cold intolerance LYMPH/HEME: Anemia, bruising and history of blood transfusions NEURO: Dizziness, headache, fainting, seizures and stroke PSYCH: Anxiety and depression Physical Exam 
 
Visit Vitals BP (!) 142/86 Pulse 88 Temp 96.9 °F (36.1 °C) Resp 18 Ht 5' (1.524 m) Wt 140.2 kg (309 lb) LMP 07/16/2020 SpO2 96% BMI 60.35 kg/m² General: Well developed, well nourished 39 y.o.) female in no acute distress Head: Normocephalic, atraumatic Mouth: Clear, no overt lesions, oral mucosa pink and moist 
Neck: Supple, no masses, no adenopathy or carotid bruits, trachea midline Resp: Clear to auscultation bilaterally, now wheeze, rhonchi, or rales, excursions normal and symmetrical 
Cardio: Regular rate and rhythm, no murmurs, clicks, gallops, or rubs. No edema or varicosities. Abdomen: Obese, soft, nontender, nondistended, normoactive bowel sounds, no hernias, no hepatosplenomegaly, Extremities: Warm, well perfused, no tenderness or swelling, normal gait/station Neuro: Sensation and strength grossly intact and symmetrical 
Psych: Alert and oriented to person, place, and time. October 30, 2020 CBC, CMP, cholesterol panel, TSH,, B1, B12, folate, iron, vitamin D, H. pylori serology: Hemoglobin 10.9, cholesterol 227, triglycerides 151, vitamin D 16 with remainder laboratory profile within normal limits. Patient to have receipt of her laboratory profile was begun on vitamin D supplementation. August 25, 2020 chest x-ray: No active cardiopulmonary disease June 12, 2020 bilateral mammography: BI-RADS 2, no evidence of malignancy June 12, 2020 leprechaun ultrasound: Hepatic steatosis, no evidence of cholelithiasis September 4, 2020 bariatric nutrition/Crystal: Concurring with pursuit of surgery October 9, 2020 barb/Rafael: Concurring with pursuit of surgery August 25, 2020 EKG: Normal sinus rhythm with rate of 72normal EKG Impression: 
 
Megan Portillo is a 27-year-old black female with a body mass index of 60 and obesity related comorbidities of hypertension, hypercholesterolemia, hypertriglyceridemia, obstructive sleep apnea, and weight related arthropathy of her back and hips who would benefit from bariatric surgery. We've discussed the restrictive and malabsorptive nature of the gastric bypass and compared and contrasted with the sleeve gastrectomy. The patient understands the likelihood of losing approximately 80% of their excess weight in 12 to 18 months. She also understands the risks including but not limited to bleeding, infection, need for reoperation, anastomotic ulcers, leaks and strictures, bowel obstruction secondary to adhesions and internal hernias, DVT, PE, heart attack, stroke, and death. Patient also understands risks of inadequate weight loss, excess weight loss, vitamin insufficiency, protein malnutrition, excess skin, and loss of hair. We have reviewed the components of a successful postoperative course including requirement for a high protein, low carbohydrate diet, 60 oz a day of zero calorie liquids, daily vitamin supplementation, daily exercise, regular follow-up, and participation in support groups. We have reviewed the preoperative liver shrinking clear liquid diet, as well as reviewed any medication changes required while on the clear liquid diet. In addition, the patient understands that all medications to be taken during the first 8 weeks postoperatively can be taken whole as long as the medication is approximately the size of a Estephania 325 mg aspirin tablet in size.   The patient further understands that it is his/her responsibility to review these and verify with their primary care doctor and pharmacist that all medications are of the appropriate size. We will schedule the patient for laparoscopic gastric bypassin the near future.

## 2020-11-23 ENCOUNTER — TELEPHONE (OUTPATIENT)
Dept: MEDSURG UNIT | Age: 45
End: 2020-11-23

## 2020-11-23 RX ORDER — ENOXAPARIN SODIUM 100 MG/ML
40 INJECTION SUBCUTANEOUS DAILY
Qty: 7 SYRINGE | Refills: 0 | Status: SHIPPED | OUTPATIENT
Start: 2020-11-23 | End: 2020-12-01

## 2020-11-24 ENCOUNTER — HOSPITAL ENCOUNTER (OUTPATIENT)
Dept: BARIATRICS/WEIGHT MGMT | Age: 45
Discharge: HOME | End: 2020-11-24

## 2020-11-24 ENCOUNTER — DOCUMENTATION ONLY (OUTPATIENT)
Dept: MEDSURG UNIT | Age: 45
End: 2020-11-24

## 2020-11-24 ENCOUNTER — DOCUMENTATION ONLY (OUTPATIENT)
Dept: BARIATRICS/WEIGHT MGMT | Age: 45
End: 2020-11-24

## 2020-11-24 DIAGNOSIS — Z01.818 PREOP TESTING: Primary | ICD-10-CM

## 2020-11-24 NOTE — PROGRESS NOTES
CLINICAL NUTRITION PRE-OPERATIVE EDUCATION    Patient's Name: Megan Portillo   Age: 39 y.o. YOB: 1975   Sex: female    Education & Materials Provided:   - Soft Protein Diet Shopping List  -  Supplemental Resource Guide: MVI, B12, Calcium Citrate, Vitamin D, Vitamin B1,   and iron recommendations  - Protein Supplement Information  - Fluid Requirements/ No Straws  - No Caffeine or Carbonation   - No alcohol              - No Snacks or No Concentrated Sweets     - Exercising   - [facility]. Support System after surgery includes: x     - Key Diet Principles            - Addressed Current Habits/Changes to Make   - Patient has been educated on the liquid diet to begin 1 week prior to surgery. Patient understands the transition of the diet. Attendance of support group: Yes    Summary:  Patient has completed 6 visits with the Registered Dietitian. During these nutrition visits, we focused on dietary changes, behavior changes, and the importance of establishing an exercise routine. The diet protocol that patient was prescribed emphasized on low carbohydrates (less than 100 grams per day prior to surgery and 60-80 grams of protein per day). At today's session, patient was educated on the post-op diet protocol. Patient understands the importance of keeping total fat and sugar less than 3 grams per serving. Patient is aware of the transition of the diet stages and is aware that they will be on clear liquids for 7days, followed by soft protein for 5 weeks. Patient understands the body needs ~ 60-70 grams of protein per day. During the liquid phase, patient will need 60 grams of protein from shakes. Once eating soft protein, patient will only need 1 shake per day. Patient is aware of the importance of the vitamins and protein drinks. We spent a lot of time talking about the vitamins.   Patient understands the importance of being compliant with the diet protocol and the complications and risks that can occur if they are non-compliant with the nutritional protocol and non-compliant with the vitamins. Patient has also been educated on the pre-op liquid diet for 1 week. Patient understands that failure to comply in pre-op liquid diet could result in surgery being canceled. Patient's 6 week post-op nutrition appointment has been scheduled. At this 6 week visit, RD will assess how patient is tolerating soft protein and advance to vegetables, if tolerating soft protein without difficulty. Patient will also see RD again at 9 months post-op. This visit will assess patient's compliance with current protocol, including diet, vitamins, protein shakes, and exercise. Post-op diet guidelines will be reinforced. RD is available for questions and to meet with patient outside of the 6 week and 9 month post-op visit. Ok to proceed.      Candidate for surgery: Yes  Re-evaluation Date:     Boogie Hyde 87 RD  11/24/2020

## 2020-11-25 ENCOUNTER — HOSPITAL ENCOUNTER (OUTPATIENT)
Dept: GENERAL RADIOLOGY | Age: 45
Discharge: HOME | End: 2020-11-25
Payer: MEDICAID

## 2020-11-25 ENCOUNTER — HOSPITAL ENCOUNTER (OUTPATIENT)
Dept: PREADMISSION TESTING | Age: 45
Discharge: HOME | End: 2020-11-25
Payer: MEDICAID

## 2020-11-25 DIAGNOSIS — Z01.818 PRE-OP EXAM: ICD-10-CM

## 2020-11-25 DIAGNOSIS — Z01.818 PREOP TESTING: ICD-10-CM

## 2020-11-25 PROCEDURE — 87635 SARS-COV-2 COVID-19 AMP PRB: CPT

## 2020-11-25 PROCEDURE — 71046 X-RAY EXAM CHEST 2 VIEWS: CPT

## 2020-11-28 ENCOUNTER — ANESTHESIA EVENT (OUTPATIENT)
Dept: SURGERY | Age: 45
DRG: 403 | End: 2020-11-28
Payer: MEDICAID

## 2020-11-28 LAB — SARS-COV-2, COV2NT: NOT DETECTED

## 2020-11-30 ENCOUNTER — HOSPITAL ENCOUNTER (INPATIENT)
Age: 45
LOS: 1 days | Discharge: HOME | DRG: 403 | End: 2020-12-01
Attending: SURGERY | Admitting: SURGERY
Payer: MEDICAID

## 2020-11-30 ENCOUNTER — ANESTHESIA (OUTPATIENT)
Dept: SURGERY | Age: 45
DRG: 403 | End: 2020-11-30
Payer: MEDICAID

## 2020-11-30 DIAGNOSIS — G89.18 POST-OP PAIN: Primary | ICD-10-CM

## 2020-11-30 DIAGNOSIS — L76.82 INCISIONAL PAIN: ICD-10-CM

## 2020-11-30 PROBLEM — E66.01 MORBID OBESITY WITH BMI OF 60.0-69.9, ADULT (HCC): Status: ACTIVE | Noted: 2020-11-30

## 2020-11-30 LAB
AMPHET UR QL SCN: NEGATIVE
BARBITURATES UR QL SCN: NEGATIVE
BENZODIAZ UR QL: NEGATIVE
CANNABINOIDS UR QL SCN: NEGATIVE
COCAINE UR QL SCN: NEGATIVE
HCG UR QL: NEGATIVE
HDSCOM,HDSCOM: NORMAL
METHADONE UR QL: NEGATIVE
OPIATES UR QL: NEGATIVE
PCP UR QL: NEGATIVE

## 2020-11-30 PROCEDURE — 77030008598 HC TRCR ENDOSC BLDLS J&J -B: Performed by: SURGERY

## 2020-11-30 PROCEDURE — 74011250636 HC RX REV CODE- 250/636: Performed by: SURGERY

## 2020-11-30 PROCEDURE — 81025 URINE PREGNANCY TEST: CPT

## 2020-11-30 PROCEDURE — 47379 UNLISTED LAPS PX LIVER: CPT | Performed by: SURGERY

## 2020-11-30 PROCEDURE — 77030040922 HC BLNKT HYPOTHRM STRY -A: Performed by: SURGERY

## 2020-11-30 PROCEDURE — 74011250636 HC RX REV CODE- 250/636

## 2020-11-30 PROCEDURE — 76060000035 HC ANESTHESIA 2 TO 2.5 HR: Performed by: SURGERY

## 2020-11-30 PROCEDURE — 77030010031 HC SCIS ENDOSC MPLR J&J -C: Performed by: SURGERY

## 2020-11-30 PROCEDURE — 77030009968 HC RELD STPLR ENDOSC J&J -D: Performed by: SURGERY

## 2020-11-30 PROCEDURE — 80307 DRUG TEST PRSMV CHEM ANLYZR: CPT

## 2020-11-30 PROCEDURE — 77030008603 HC TRCR ENDOSC EPATH J&J -C: Performed by: SURGERY

## 2020-11-30 PROCEDURE — 88313 SPECIAL STAINS GROUP 2: CPT

## 2020-11-30 PROCEDURE — 74011250636 HC RX REV CODE- 250/636: Performed by: ANESTHESIOLOGY

## 2020-11-30 PROCEDURE — 0DNU4ZZ RELEASE OMENTUM, PERCUTANEOUS ENDOSCOPIC APPROACH: ICD-10-PCS | Performed by: SURGERY

## 2020-11-30 PROCEDURE — 77030031139 HC SUT VCRL2 J&J -A: Performed by: SURGERY

## 2020-11-30 PROCEDURE — 0FB14ZX EXCISION OF RIGHT LOBE LIVER, PERCUTANEOUS ENDOSCOPIC APPROACH, DIAGNOSTIC: ICD-10-PCS | Performed by: SURGERY

## 2020-11-30 PROCEDURE — 76010000131 HC OR TIME 2 TO 2.5 HR: Performed by: SURGERY

## 2020-11-30 PROCEDURE — 74011000250 HC RX REV CODE- 250: Performed by: ANESTHESIOLOGY

## 2020-11-30 PROCEDURE — 74011000250 HC RX REV CODE- 250: Performed by: SURGERY

## 2020-11-30 PROCEDURE — C9290 INJ, BUPIVACAINE LIPOSOME: HCPCS | Performed by: SURGERY

## 2020-11-30 PROCEDURE — 77030002996 HC SUT SLK J&J -A: Performed by: SURGERY

## 2020-11-30 PROCEDURE — 74011250636 HC RX REV CODE- 250/636: Performed by: NURSE ANESTHETIST, CERTIFIED REGISTERED

## 2020-11-30 PROCEDURE — 77030002933 HC SUT MCRYL J&J -A: Performed by: SURGERY

## 2020-11-30 PROCEDURE — 0D164ZA BYPASS STOMACH TO JEJUNUM, PERCUTANEOUS ENDOSCOPIC APPROACH: ICD-10-PCS | Performed by: SURGERY

## 2020-11-30 PROCEDURE — 65270000029 HC RM PRIVATE

## 2020-11-30 PROCEDURE — 2709999900 HC NON-CHARGEABLE SUPPLY

## 2020-11-30 PROCEDURE — 43644 LAP GASTRIC BYPASS/ROUX-EN-Y: CPT | Performed by: SURGERY

## 2020-11-30 PROCEDURE — 76210000016 HC OR PH I REC 1 TO 1.5 HR: Performed by: SURGERY

## 2020-11-30 PROCEDURE — 0WQF4ZZ REPAIR ABDOMINAL WALL, PERCUTANEOUS ENDOSCOPIC APPROACH: ICD-10-PCS | Performed by: SURGERY

## 2020-11-30 PROCEDURE — 77030040830 HC CATH URETH FOL MDII -A: Performed by: SURGERY

## 2020-11-30 PROCEDURE — 74011000250 HC RX REV CODE- 250: Performed by: NURSE ANESTHETIST, CERTIFIED REGISTERED

## 2020-11-30 PROCEDURE — 88307 TISSUE EXAM BY PATHOLOGIST: CPT

## 2020-11-30 PROCEDURE — 77030040361 HC SLV COMPR DVT MDII -B: Performed by: SURGERY

## 2020-11-30 PROCEDURE — 77030009932 HC PRB FT CTRL J&J -B: Performed by: SURGERY

## 2020-11-30 PROCEDURE — 77030027876 HC STPLR ENDOSC FLX PWR J&J -G1: Performed by: SURGERY

## 2020-11-30 PROCEDURE — 77030036732 HC RELD STPLR VASC J&J -F: Performed by: SURGERY

## 2020-11-30 PROCEDURE — 77030008683 HC TU ET CUF COVD -A: Performed by: ANESTHESIOLOGY

## 2020-11-30 PROCEDURE — 77030009851 HC PCH RTVR ENDOSC AMR -B: Performed by: SURGERY

## 2020-11-30 PROCEDURE — 77030008437 HC REINF STRP REINF SEMGD WLGO -C: Performed by: SURGERY

## 2020-11-30 PROCEDURE — 2709999900 HC NON-CHARGEABLE SUPPLY: Performed by: SURGERY

## 2020-11-30 PROCEDURE — 74011250637 HC RX REV CODE- 250/637: Performed by: SURGERY

## 2020-11-30 PROCEDURE — 00797 ANES IPER UPR ABD GSTR PX MO: CPT | Performed by: ANESTHESIOLOGY

## 2020-11-30 PROCEDURE — 49653 PR LAP, VENTRAL HERNIA REPAIR,INCARCERATED: CPT | Performed by: SURGERY

## 2020-11-30 PROCEDURE — 77030033639 HC SHR ENDO COAG HARM 36 J&J -E: Performed by: SURGERY

## 2020-11-30 PROCEDURE — 77030013079 HC BLNKT BAIR HGGR 3M -A: Performed by: ANESTHESIOLOGY

## 2020-11-30 PROCEDURE — 77030019605: Performed by: SURGERY

## 2020-11-30 RX ORDER — LIDOCAINE HYDROCHLORIDE 20 MG/ML
INJECTION, SOLUTION EPIDURAL; INFILTRATION; INTRACAUDAL; PERINEURAL AS NEEDED
Status: DISCONTINUED | OUTPATIENT
Start: 2020-11-30 | End: 2020-11-30 | Stop reason: HOSPADM

## 2020-11-30 RX ORDER — OXYCODONE HYDROCHLORIDE 5 MG/1
5 TABLET ORAL
Status: DISCONTINUED | OUTPATIENT
Start: 2020-11-30 | End: 2020-12-01 | Stop reason: HOSPADM

## 2020-11-30 RX ORDER — INSULIN LISPRO 100 [IU]/ML
INJECTION, SOLUTION INTRAVENOUS; SUBCUTANEOUS ONCE
Status: DISCONTINUED | OUTPATIENT
Start: 2020-11-30 | End: 2020-11-30 | Stop reason: HOSPADM

## 2020-11-30 RX ORDER — ROCURONIUM BROMIDE 10 MG/ML
INJECTION, SOLUTION INTRAVENOUS AS NEEDED
Status: DISCONTINUED | OUTPATIENT
Start: 2020-11-30 | End: 2020-11-30 | Stop reason: HOSPADM

## 2020-11-30 RX ORDER — SODIUM CHLORIDE 0.9 % (FLUSH) 0.9 %
5-40 SYRINGE (ML) INJECTION AS NEEDED
Status: DISCONTINUED | OUTPATIENT
Start: 2020-11-30 | End: 2020-11-30 | Stop reason: HOSPADM

## 2020-11-30 RX ORDER — ACETAMINOPHEN 325 MG/1
650 TABLET ORAL EVERY 6 HOURS
Status: DISCONTINUED | OUTPATIENT
Start: 2020-11-30 | End: 2020-11-30

## 2020-11-30 RX ORDER — LIDOCAINE HYDROCHLORIDE 10 MG/ML
0.1 INJECTION, SOLUTION EPIDURAL; INFILTRATION; INTRACAUDAL; PERINEURAL AS NEEDED
Status: DISCONTINUED | OUTPATIENT
Start: 2020-11-30 | End: 2020-11-30 | Stop reason: HOSPADM

## 2020-11-30 RX ORDER — SUCCINYLCHOLINE CHLORIDE 20 MG/ML
INJECTION INTRAMUSCULAR; INTRAVENOUS AS NEEDED
Status: DISCONTINUED | OUTPATIENT
Start: 2020-11-30 | End: 2020-11-30 | Stop reason: HOSPADM

## 2020-11-30 RX ORDER — DEXAMETHASONE SODIUM PHOSPHATE 4 MG/ML
INJECTION, SOLUTION INTRA-ARTICULAR; INTRALESIONAL; INTRAMUSCULAR; INTRAVENOUS; SOFT TISSUE AS NEEDED
Status: DISCONTINUED | OUTPATIENT
Start: 2020-11-30 | End: 2020-11-30 | Stop reason: HOSPADM

## 2020-11-30 RX ORDER — NALOXONE HYDROCHLORIDE 0.4 MG/ML
0.4 INJECTION, SOLUTION INTRAMUSCULAR; INTRAVENOUS; SUBCUTANEOUS AS NEEDED
Status: DISCONTINUED | OUTPATIENT
Start: 2020-11-30 | End: 2020-12-01 | Stop reason: HOSPADM

## 2020-11-30 RX ORDER — PROMETHAZINE HYDROCHLORIDE 25 MG/ML
12.5 INJECTION, SOLUTION INTRAMUSCULAR; INTRAVENOUS ONCE
Status: COMPLETED | OUTPATIENT
Start: 2020-11-30 | End: 2020-11-30

## 2020-11-30 RX ORDER — ACETAMINOPHEN 325 MG/1
650 TABLET ORAL EVERY 6 HOURS
Status: DISCONTINUED | OUTPATIENT
Start: 2020-11-30 | End: 2020-12-01 | Stop reason: HOSPADM

## 2020-11-30 RX ORDER — ENOXAPARIN SODIUM 100 MG/ML
40 INJECTION SUBCUTANEOUS ONCE
Status: COMPLETED | OUTPATIENT
Start: 2020-11-30 | End: 2020-11-30

## 2020-11-30 RX ORDER — ONDANSETRON 2 MG/ML
INJECTION INTRAMUSCULAR; INTRAVENOUS AS NEEDED
Status: DISCONTINUED | OUTPATIENT
Start: 2020-11-30 | End: 2020-11-30 | Stop reason: HOSPADM

## 2020-11-30 RX ORDER — FAMOTIDINE 20 MG/1
20 TABLET, FILM COATED ORAL ONCE
Status: DISCONTINUED | OUTPATIENT
Start: 2020-11-30 | End: 2020-11-30 | Stop reason: HOSPADM

## 2020-11-30 RX ORDER — HYDROMORPHONE HYDROCHLORIDE 1 MG/ML
1 INJECTION, SOLUTION INTRAMUSCULAR; INTRAVENOUS; SUBCUTANEOUS
Status: DISCONTINUED | OUTPATIENT
Start: 2020-11-30 | End: 2020-12-01 | Stop reason: HOSPADM

## 2020-11-30 RX ORDER — ALPRAZOLAM 0.25 MG/1
0.25 TABLET ORAL
Status: DISCONTINUED | OUTPATIENT
Start: 2020-11-30 | End: 2020-12-01 | Stop reason: HOSPADM

## 2020-11-30 RX ORDER — MIDAZOLAM HYDROCHLORIDE 1 MG/ML
INJECTION, SOLUTION INTRAMUSCULAR; INTRAVENOUS AS NEEDED
Status: DISCONTINUED | OUTPATIENT
Start: 2020-11-30 | End: 2020-11-30 | Stop reason: HOSPADM

## 2020-11-30 RX ORDER — ONDANSETRON 2 MG/ML
4 INJECTION INTRAMUSCULAR; INTRAVENOUS ONCE
Status: COMPLETED | OUTPATIENT
Start: 2020-11-30 | End: 2020-11-30

## 2020-11-30 RX ORDER — DEXTROSE 50 % IN WATER (D50W) INTRAVENOUS SYRINGE
25-50 AS NEEDED
Status: DISCONTINUED | OUTPATIENT
Start: 2020-11-30 | End: 2020-11-30 | Stop reason: HOSPADM

## 2020-11-30 RX ORDER — PROMETHAZINE HYDROCHLORIDE 25 MG/ML
INJECTION, SOLUTION INTRAMUSCULAR; INTRAVENOUS
Status: COMPLETED
Start: 2020-11-30 | End: 2020-11-30

## 2020-11-30 RX ORDER — SODIUM CHLORIDE 0.9 % (FLUSH) 0.9 %
5-40 SYRINGE (ML) INJECTION EVERY 8 HOURS
Status: DISCONTINUED | OUTPATIENT
Start: 2020-11-30 | End: 2020-11-30 | Stop reason: HOSPADM

## 2020-11-30 RX ORDER — FENTANYL CITRATE 50 UG/ML
50 INJECTION, SOLUTION INTRAMUSCULAR; INTRAVENOUS
Status: DISCONTINUED | OUTPATIENT
Start: 2020-11-30 | End: 2020-11-30 | Stop reason: HOSPADM

## 2020-11-30 RX ORDER — FENTANYL CITRATE 50 UG/ML
25 INJECTION, SOLUTION INTRAMUSCULAR; INTRAVENOUS AS NEEDED
Status: DISCONTINUED | OUTPATIENT
Start: 2020-11-30 | End: 2020-11-30 | Stop reason: HOSPADM

## 2020-11-30 RX ORDER — ONDANSETRON 2 MG/ML
INJECTION INTRAMUSCULAR; INTRAVENOUS
Status: COMPLETED
Start: 2020-11-30 | End: 2020-11-30

## 2020-11-30 RX ORDER — DIPHENHYDRAMINE HYDROCHLORIDE 50 MG/ML
25 INJECTION, SOLUTION INTRAMUSCULAR; INTRAVENOUS
Status: DISCONTINUED | OUTPATIENT
Start: 2020-11-30 | End: 2020-12-01 | Stop reason: HOSPADM

## 2020-11-30 RX ORDER — KETOROLAC TROMETHAMINE 15 MG/ML
15 INJECTION, SOLUTION INTRAMUSCULAR; INTRAVENOUS
Status: DISCONTINUED | OUTPATIENT
Start: 2020-11-30 | End: 2020-12-01 | Stop reason: HOSPADM

## 2020-11-30 RX ORDER — HYDROCODONE BITARTRATE AND ACETAMINOPHEN 5; 325 MG/1; MG/1
1 TABLET ORAL AS NEEDED
Status: DISCONTINUED | OUTPATIENT
Start: 2020-11-30 | End: 2020-11-30 | Stop reason: HOSPADM

## 2020-11-30 RX ORDER — SODIUM CHLORIDE, SODIUM LACTATE, POTASSIUM CHLORIDE, CALCIUM CHLORIDE 600; 310; 30; 20 MG/100ML; MG/100ML; MG/100ML; MG/100ML
150 INJECTION, SOLUTION INTRAVENOUS CONTINUOUS
Status: DISCONTINUED | OUTPATIENT
Start: 2020-11-30 | End: 2020-12-01 | Stop reason: HOSPADM

## 2020-11-30 RX ORDER — PROPOFOL 10 MG/ML
INJECTION, EMULSION INTRAVENOUS AS NEEDED
Status: DISCONTINUED | OUTPATIENT
Start: 2020-11-30 | End: 2020-11-30 | Stop reason: HOSPADM

## 2020-11-30 RX ORDER — HYOSCYAMINE SULFATE 0.12 MG/1
0.12 TABLET SUBLINGUAL
Status: DISCONTINUED | OUTPATIENT
Start: 2020-11-30 | End: 2020-12-01 | Stop reason: HOSPADM

## 2020-11-30 RX ORDER — MAGNESIUM SULFATE 100 %
4 CRYSTALS MISCELLANEOUS AS NEEDED
Status: DISCONTINUED | OUTPATIENT
Start: 2020-11-30 | End: 2020-11-30 | Stop reason: HOSPADM

## 2020-11-30 RX ORDER — ENOXAPARIN SODIUM 100 MG/ML
40 INJECTION SUBCUTANEOUS DAILY
Status: DISCONTINUED | OUTPATIENT
Start: 2020-12-01 | End: 2020-12-01 | Stop reason: HOSPADM

## 2020-11-30 RX ORDER — CHLORHEXIDINE GLUCONATE 4 G/100ML
SOLUTION TOPICAL AS NEEDED
Status: DISCONTINUED | OUTPATIENT
Start: 2020-11-30 | End: 2020-11-30 | Stop reason: HOSPADM

## 2020-11-30 RX ORDER — ONDANSETRON 2 MG/ML
4 INJECTION INTRAMUSCULAR; INTRAVENOUS
Status: DISCONTINUED | OUTPATIENT
Start: 2020-11-30 | End: 2020-12-01 | Stop reason: HOSPADM

## 2020-11-30 RX ORDER — FENTANYL CITRATE 50 UG/ML
INJECTION, SOLUTION INTRAMUSCULAR; INTRAVENOUS AS NEEDED
Status: DISCONTINUED | OUTPATIENT
Start: 2020-11-30 | End: 2020-11-30 | Stop reason: HOSPADM

## 2020-11-30 RX ORDER — ACETAMINOPHEN 650 MG/1
650 SUPPOSITORY RECTAL ONCE
Status: DISCONTINUED | OUTPATIENT
Start: 2020-11-30 | End: 2020-11-30 | Stop reason: HOSPADM

## 2020-11-30 RX ORDER — SODIUM CHLORIDE, SODIUM LACTATE, POTASSIUM CHLORIDE, CALCIUM CHLORIDE 600; 310; 30; 20 MG/100ML; MG/100ML; MG/100ML; MG/100ML
150 INJECTION, SOLUTION INTRAVENOUS CONTINUOUS
Status: DISCONTINUED | OUTPATIENT
Start: 2020-11-30 | End: 2020-11-30 | Stop reason: HOSPADM

## 2020-11-30 RX ORDER — SODIUM CHLORIDE, SODIUM LACTATE, POTASSIUM CHLORIDE, CALCIUM CHLORIDE 600; 310; 30; 20 MG/100ML; MG/100ML; MG/100ML; MG/100ML
75 INJECTION, SOLUTION INTRAVENOUS CONTINUOUS
Status: DISCONTINUED | OUTPATIENT
Start: 2020-11-30 | End: 2020-11-30 | Stop reason: HOSPADM

## 2020-11-30 RX ADMIN — ONDANSETRON 4 MG: 2 INJECTION INTRAMUSCULAR; INTRAVENOUS at 10:44

## 2020-11-30 RX ADMIN — FENTANYL CITRATE 25 MCG: 50 INJECTION, SOLUTION INTRAMUSCULAR; INTRAVENOUS at 08:58

## 2020-11-30 RX ADMIN — MIDAZOLAM HYDROCHLORIDE 1 MG: 2 INJECTION, SOLUTION INTRAMUSCULAR; INTRAVENOUS at 08:42

## 2020-11-30 RX ADMIN — ROCURONIUM BROMIDE 30 MG: 50 INJECTION INTRAVENOUS at 08:53

## 2020-11-30 RX ADMIN — KETOROLAC TROMETHAMINE 15 MG: 15 INJECTION, SOLUTION INTRAMUSCULAR; INTRAVENOUS at 23:21

## 2020-11-30 RX ADMIN — FENTANYL CITRATE 25 MCG: 50 INJECTION, SOLUTION INTRAMUSCULAR; INTRAVENOUS at 09:09

## 2020-11-30 RX ADMIN — FENTANYL CITRATE 25 MCG: 50 INJECTION, SOLUTION INTRAMUSCULAR; INTRAVENOUS at 09:42

## 2020-11-30 RX ADMIN — FENTANYL CITRATE 50 MCG: 50 INJECTION, SOLUTION INTRAMUSCULAR; INTRAVENOUS at 11:45

## 2020-11-30 RX ADMIN — MIDAZOLAM HYDROCHLORIDE 1 MG: 2 INJECTION, SOLUTION INTRAMUSCULAR; INTRAVENOUS at 08:49

## 2020-11-30 RX ADMIN — FAMOTIDINE 20 MG: 10 INJECTION INTRAVENOUS at 08:13

## 2020-11-30 RX ADMIN — SODIUM CHLORIDE, SODIUM LACTATE, POTASSIUM CHLORIDE, AND CALCIUM CHLORIDE: 600; 310; 30; 20 INJECTION, SOLUTION INTRAVENOUS at 08:42

## 2020-11-30 RX ADMIN — PROPOFOL 200 MG: 10 INJECTION, EMULSION INTRAVENOUS at 08:49

## 2020-11-30 RX ADMIN — FENTANYL CITRATE 25 MCG: 50 INJECTION, SOLUTION INTRAMUSCULAR; INTRAVENOUS at 09:21

## 2020-11-30 RX ADMIN — SODIUM CHLORIDE 1000 ML: 900 INJECTION, SOLUTION INTRAVENOUS at 13:00

## 2020-11-30 RX ADMIN — ONDANSETRON 4 MG: 2 INJECTION INTRAMUSCULAR; INTRAVENOUS at 11:35

## 2020-11-30 RX ADMIN — FENTANYL CITRATE 25 MCG: 50 INJECTION, SOLUTION INTRAMUSCULAR; INTRAVENOUS at 08:49

## 2020-11-30 RX ADMIN — ENOXAPARIN SODIUM 40 MG: 40 INJECTION SUBCUTANEOUS at 08:13

## 2020-11-30 RX ADMIN — FENTANYL CITRATE 25 MCG: 50 INJECTION, SOLUTION INTRAMUSCULAR; INTRAVENOUS at 09:33

## 2020-11-30 RX ADMIN — ACETAMINOPHEN 650 MG: 325 TABLET ORAL at 20:18

## 2020-11-30 RX ADMIN — ROCURONIUM BROMIDE 20 MG: 50 INJECTION INTRAVENOUS at 09:39

## 2020-11-30 RX ADMIN — PROMETHAZINE HYDROCHLORIDE 12.5 MG: 25 INJECTION INTRAMUSCULAR; INTRAVENOUS at 11:30

## 2020-11-30 RX ADMIN — OXYCODONE HYDROCHLORIDE 5 MG: 5 TABLET ORAL at 13:27

## 2020-11-30 RX ADMIN — KETOROLAC TROMETHAMINE 15 MG: 15 INJECTION, SOLUTION INTRAMUSCULAR; INTRAVENOUS at 17:30

## 2020-11-30 RX ADMIN — HYOSCYAMINE SULFATE 0.12 MG: 0.12 TABLET ORAL; SUBLINGUAL at 13:27

## 2020-11-30 RX ADMIN — PROMETHAZINE HYDROCHLORIDE 12.5 MG: 25 INJECTION, SOLUTION INTRAMUSCULAR; INTRAVENOUS at 11:30

## 2020-11-30 RX ADMIN — ACETAMINOPHEN 650 MG: 325 TABLET ORAL at 13:27

## 2020-11-30 RX ADMIN — SODIUM CHLORIDE, SODIUM LACTATE, POTASSIUM CHLORIDE, AND CALCIUM CHLORIDE 75 ML/HR: 600; 310; 30; 20 INJECTION, SOLUTION INTRAVENOUS at 08:16

## 2020-11-30 RX ADMIN — FENTANYL CITRATE 25 MCG: 50 INJECTION, SOLUTION INTRAMUSCULAR; INTRAVENOUS at 09:02

## 2020-11-30 RX ADMIN — SODIUM CHLORIDE, SODIUM LACTATE, POTASSIUM CHLORIDE, AND CALCIUM CHLORIDE 150 ML/HR: 600; 310; 30; 20 INJECTION, SOLUTION INTRAVENOUS at 15:19

## 2020-11-30 RX ADMIN — FENTANYL CITRATE 25 MCG: 50 INJECTION, SOLUTION INTRAMUSCULAR; INTRAVENOUS at 10:36

## 2020-11-30 RX ADMIN — FENTANYL CITRATE 50 MCG: 50 INJECTION, SOLUTION INTRAMUSCULAR; INTRAVENOUS at 11:55

## 2020-11-30 RX ADMIN — SUCCINYLCHOLINE CHLORIDE 120 MG: 20 INJECTION, SOLUTION INTRAMUSCULAR; INTRAVENOUS at 08:49

## 2020-11-30 RX ADMIN — DEXAMETHASONE SODIUM PHOSPHATE 4 MG: 4 INJECTION, SOLUTION INTRAMUSCULAR; INTRAVENOUS at 09:13

## 2020-11-30 RX ADMIN — CEFAZOLIN 3 G: 1 INJECTION, POWDER, FOR SOLUTION INTRAMUSCULAR; INTRAVENOUS; PARENTERAL at 09:00

## 2020-11-30 RX ADMIN — ROCURONIUM BROMIDE 20 MG: 50 INJECTION INTRAVENOUS at 09:03

## 2020-11-30 RX ADMIN — LIDOCAINE HYDROCHLORIDE 50 MG: 20 INJECTION, SOLUTION EPIDURAL; INFILTRATION; INTRACAUDAL; PERINEURAL at 08:49

## 2020-11-30 NOTE — ROUTINE PROCESS
Bedside shift change report given to Annamaria (oncoming nurse) by Flaquito Ho (offgoing nurse). Report included the following information SBAR, Kardex, Intake/Output, MAR and Recent Results.

## 2020-11-30 NOTE — BRIEF OP NOTE
Brief Postoperative Note    Patient: Nate Cam  YOB: 1975  MRN: [id number]    Date of Procedure: 11/30/2020     Pre-Op Diagnosis: Morbid obesity ([address] [E66.01]  Essential hypertension [I10]  Gastroesophageal reflux disease, unspecified whether esophagitis present [K21.9]    Post-Op Diagnosis:   1. Intra-abdominal adhesive disease  2. Super, super obesity with a body mass index of 60 with obesity related comorbidities of hypertension, gastroesophageal reflux disease, clinical obstructive sleep apnea, and weight related arthropathy-back, knees  3. Hepatic steatosis  4. Incarcerated umbilical hernia    Procedure(s):  1. Laparoscopic lysis of adhesions  2. Laparoscopic Khushboo-en-Y gastric bypass  3. Laparoscopic left hepatic lobe wedge biopsy  4.   Laparoscopic primary repair of a 3 cm umbilical fascial hernia defect  Surgeon(s):  Vega Alvarez DO    Surgical Assistant: Surg Asst-1: Nessa Malone    Anesthesia: General     Estimated Blood Loss (mL): Minimal    Complications: None    Specimens:   ID Type Source Tests Collected by Time Destination   1 : Liver biopsy Preservative Liver  Vega Alvarez DO 11/30/2020 1035 Pathology        Implants: * No implants in log *    Drains: * No LDAs found *    Findings: Intra-abdominal adhesive disease consisting of omental adhesions to the anterior abdominal wall in the infraumbilical midline secondary to a previous hysterectomy, hepatomegaly with morphologic appearance hepatic steatosis, umbilical hernia with a 3 cm fascial defect    Electronically Signed by Quintin Greer DO on 11/30/2020 at 11:00 AM

## 2020-11-30 NOTE — INTERVAL H&P NOTE
Update History & Physical    The Patient's History and Physical of November 30, 2020 was reviewed patient. There has been no new medical surgical history since that interview. The proposed laparoscopic gastric bypass was reviewed with the patient and I examined the patient. There was no change in the plan to proceed laparoscopically open Khushboo-en-Y gastric bypass to achieve definitive durable weight loss on a personal level expected resolution of obesity related comorbidities. The surgical site was confirmed by the patient and me. Plan:  The risk, benefits, expected outcome, and alternative to the recommended procedure have been discussed with the patient. Patient understands and wants to proceed with the procedure.     Electronically signed by Ab Aragon DO on 11/30/2020 at 8:19 AM

## 2020-11-30 NOTE — PROGRESS NOTES
Problem: Patient Education: Go to Patient Education Activity  Goal: Patient/Family Education  Outcome: Progressing Towards Goal     Problem: Laparoscopic Gastric Bypass:Day of Surgery  Goal: Off Pathway (Use only if patient is Off Pathway)  Outcome: Progressing Towards Goal  Goal: Activity/Safety  Outcome: Progressing Towards Goal  Goal: Consults, if ordered  Outcome: Progressing Towards Goal  Goal: Diagnostic Test/Procedures  Outcome: Progressing Towards Goal  Goal: Nutrition/Diet  Outcome: Progressing Towards Goal  Goal: Medications  Outcome: Progressing Towards Goal  Goal: Respiratory  Outcome: Progressing Towards Goal  Goal: Treatments/Interventions/Procedures  Outcome: Progressing Towards Goal  Goal: Psychosocial  Outcome: Progressing Towards Goal  Goal: *No signs and symptoms of infection or wound complications  Outcome: Progressing Towards Goal  Goal: *Optimal pain control at patient's stated goal  Outcome: Progressing Towards Goal  Goal: *Adequate urinary output (equal to or greater than 30 milliliters/hour)  Outcome: Progressing Towards Goal  Goal: *Hemodynamically stable  Outcome: Progressing Towards Goal  Goal: *Tolerating diet  Outcome: Progressing Towards Goal  Goal: *Demonstrates progressive activity  Outcome: Progressing Towards Goal  Goal: *Absence of signs and symptoms of DVT  Outcome: Progressing Towards Goal  Goal: *Labs within defined limits  Outcome: Progressing Towards Goal  Goal: *Oxygen saturation within defined limits  Outcome: Progressing Towards Goal

## 2020-11-30 NOTE — PERIOP NOTES
TRANSFER - OUT REPORT:    Verbal report given to Brooke(name) on [place] [facility]  being transferred to 220(unit) for routine post - op       Report consisted of patients Situation, Background, Assessment and   Recommendations(SBAR). Information from the following report(s) SBAR, OR Summary and MAR was reviewed with the receiving nurse. Lines:   Peripheral IV 11/30/20 Right Antecubital (Active)   Site Assessment Clean, dry, & intact 11/30/20 1352   Phlebitis Assessment 0 11/30/20 1352   Infiltration Assessment 0 11/30/20 1352   Dressing Status Clean, dry, & intact 11/30/20 1352   Dressing Type Tape;Transparent 11/30/20 1352   Hub Color/Line Status Pink; Infusing 11/30/20 1352   Alcohol Cap Used No 11/30/20 0809        Opportunity for questions and clarification was provided.       Patient transported with:   O2 @ 4 liters  Tech

## 2020-11-30 NOTE — OP NOTES
[address] [place] [address]  
OPERATIVE REPORT Name:  Vidhi Jones 
MR#:   [id number] :  1975 ACCOUNT #:  [de-identified] DATE OF SERVICE:  2020 NO DICTATION 
 
PREOPERATIVE DIAGNOSIS:  *** POSTOPERATIVE DIAGNOSIS:  *** PROCEDURE PERFORMED:  *** SURGEON:  *** ASSISTANT: *** 
 
ANESTHESIA:  *** COMPLICATIONS:  *** SPECIMENS REMOVED:  *** IMPLANTS:  *** 
 
ESTIMATED BLOOD LOSS:  *** DO ZEE Vann/[id number]/[id number] 
D:  2020 8:43 
T:  2020 11:51 
JOB #:  [id number]

## 2020-11-30 NOTE — PROGRESS NOTES
Reason for Admission:  Morbid obesity ([facility].) [E66.01]  Essential hypertension [I10]  Gastroesophageal reflux disease, unspecified whether esophagitis present [K21.9]  Morbid obesity with BMI of 60.0-69.9, adult ([facility].) [E66.01, Z68.44]                 RUR Score:    95            Plan for utilizing home health:    unlikely                      Likelihood of Readmission:   LOW                         Transition of Care Plan:              Initial assessment completed with patient. Cognitive status of patient: oriented to time, place, person and situation. Face sheet information confirmed:  yes. The patient designates daughterElizabeth to participate in her discharge plan and to receive any needed information. This patient lives in a single family home with patient and daughter. Patient is able to navigate steps as needed. Prior to hospitalization, patient was considered to be independent with ADLs/IADLS : yes . Patient has a current ACP document on file: no  The patient and daughter will be available to transport patient home upon discharge. The patient already has none reported, and  medical equipment available in the home. Patient is not currently active with home health. Patient has not stayed in a skilled nursing facility or rehab. This patient is on dialysis :no    Freedom of choice signed: no. Currently, the discharge plan is Home. The patient states that she can obtain her medications from the pharmacy, and take her medications as directed. Patient's current insurance is Medicaid       Care Management Interventions  PCP Verified by CM:  Yes  Mode of Transport at Discharge: [address] (CM Consult): Discharge Planning  Discharge Durable Medical Equipment: No  Physical Therapy Consult: No  Occupational Therapy Consult: No  Current Support Network: Lives with Caregiver(dgt to assist and stay with pt at [facility])  Confirm Follow Up Transport: Family  Discharge Location  Discharge Placement: Home with family assistance        VALARIE Doan, [facility] [phone]

## 2020-11-30 NOTE — ANESTHESIA POSTPROCEDURE EVALUATION
Procedure(s):  LYSIS OF ADHESIONS/LAPAROSCOPIC MYLES-EN-Y GASTRIC BYPASS WITH LIVER WEDGE BIOPSY/UMBILICAL HERNIA REPAIR. general    Anesthesia Post Evaluation      Multimodal analgesia: multimodal analgesia used between 6 hours prior to anesthesia start to PACU discharge  Patient location during evaluation: PACU  Patient participation: complete - patient participated  Level of consciousness: awake and alert  Pain management: adequate  Airway patency: patent  Anesthetic complications: no  Cardiovascular status: acceptable and hemodynamically stable  Respiratory status: acceptable  Hydration status: acceptable  Post anesthesia nausea and vomiting:  controlled      INITIAL Post-op Vital signs:   Vitals Value Taken Time   /85 11/30/2020 11:58 AM   Temp 36.9 °C (98.4 °F) 11/30/2020 11:02 AM   Pulse 82 11/30/2020 12:01 PM   Resp 24 11/30/2020 12:01 PM   SpO2 90 % 11/30/2020 12:01 PM   Vitals shown include unvalidated device data.

## 2020-11-30 NOTE — ANESTHESIA PREPROCEDURE EVALUATION
Relevant Problems   No relevant active problems       Anesthetic History   No history of anesthetic complications            Review of Systems / Medical History  Patient summary reviewed, nursing notes reviewed and pertinent labs reviewed    Pulmonary  Within defined limits                 Neuro/Psych   Within defined limits           Cardiovascular    Hypertension                   GI/Hepatic/Renal     GERD: well controlled           Endo/Other        Morbid obesity and arthritis     Other Findings            Physical Exam    Airway  Mallampati: III  TM Distance: 4 - 6 cm  Neck ROM: normal range of motion   Mouth opening: Normal     Cardiovascular    Rhythm: regular           Dental    Dentition: Upper dentition intact and Lower dentition intact     Pulmonary  Breath sounds clear to auscultation               Abdominal  GI exam deferred       Other Findings            Anesthetic Plan    ASA: 3  Anesthesia type: general            Anesthetic plan and risks discussed with: Patient

## 2020-12-01 VITALS
WEIGHT: 293 LBS | TEMPERATURE: 98.7 F | DIASTOLIC BLOOD PRESSURE: 84 MMHG | HEIGHT: 60 IN | HEART RATE: 76 BPM | SYSTOLIC BLOOD PRESSURE: 147 MMHG | BODY MASS INDEX: 57.52 KG/M2 | OXYGEN SATURATION: 100 % | RESPIRATION RATE: 18 BRPM

## 2020-12-01 LAB
ANION GAP SERPL CALC-SCNC: 5 MMOL/L (ref 3–18)
BUN SERPL-MCNC: 3 MG/DL (ref 7–18)
BUN/CREAT SERPL: 6 (ref 12–20)
CALCIUM SERPL-MCNC: 8.8 MG/DL (ref 8.5–10.1)
CHLORIDE SERPL-SCNC: 107 MMOL/L (ref 100–111)
CO2 SERPL-SCNC: 26 MMOL/L (ref 21–32)
CREAT SERPL-MCNC: 0.5 MG/DL (ref 0.6–1.3)
ERYTHROCYTE [DISTWIDTH] IN BLOOD BY AUTOMATED COUNT: 14.3 % (ref 11.6–14.5)
GLUCOSE SERPL-MCNC: 97 MG/DL (ref 74–99)
HCT VFR BLD AUTO: 35.3 % (ref 35–45)
HGB BLD-MCNC: 11.4 G/DL (ref 12–16)
MAGNESIUM SERPL-MCNC: 2 MG/DL (ref 1.6–2.6)
MCH RBC QN AUTO: 27.7 PG (ref 24–34)
MCHC RBC AUTO-ENTMCNC: 32.3 G/DL (ref 31–37)
MCV RBC AUTO: 85.7 FL (ref 74–97)
PLATELET # BLD AUTO: 367 K/UL (ref 135–420)
PMV BLD AUTO: 9.2 FL (ref 9.2–11.8)
POTASSIUM SERPL-SCNC: 3.7 MMOL/L (ref 3.5–5.5)
RBC # BLD AUTO: 4.12 M/UL (ref 4.2–5.3)
SODIUM SERPL-SCNC: 138 MMOL/L (ref 136–145)
WBC # BLD AUTO: 14.4 K/UL (ref 4.6–13.2)

## 2020-12-01 PROCEDURE — 74011000250 HC RX REV CODE- 250: Performed by: SURGERY

## 2020-12-01 PROCEDURE — 83735 ASSAY OF MAGNESIUM: CPT

## 2020-12-01 PROCEDURE — 80048 BASIC METABOLIC PNL TOTAL CA: CPT

## 2020-12-01 PROCEDURE — 2709999900 HC NON-CHARGEABLE SUPPLY

## 2020-12-01 PROCEDURE — 36415 COLL VENOUS BLD VENIPUNCTURE: CPT

## 2020-12-01 PROCEDURE — 74011250637 HC RX REV CODE- 250/637: Performed by: SURGERY

## 2020-12-01 PROCEDURE — C9113 INJ PANTOPRAZOLE SODIUM, VIA: HCPCS | Performed by: SURGERY

## 2020-12-01 PROCEDURE — 99024 POSTOP FOLLOW-UP VISIT: CPT | Performed by: NURSE PRACTITIONER

## 2020-12-01 PROCEDURE — 85027 COMPLETE CBC AUTOMATED: CPT

## 2020-12-01 PROCEDURE — 74011250636 HC RX REV CODE- 250/636: Performed by: SURGERY

## 2020-12-01 RX ORDER — ONDANSETRON 4 MG/1
4 TABLET, ORALLY DISINTEGRATING ORAL
Qty: 12 TAB | Refills: 0 | Status: SHIPPED | OUTPATIENT
Start: 2020-12-01 | End: 2020-12-08

## 2020-12-01 RX ORDER — OXYCODONE HYDROCHLORIDE 5 MG/1
5 TABLET ORAL
Qty: 18 TAB | Refills: 0 | Status: SHIPPED | OUTPATIENT
Start: 2020-12-01 | End: 2020-12-04

## 2020-12-01 RX ORDER — AMLODIPINE BESYLATE 10 MG/1
10 TABLET ORAL DAILY
Status: DISCONTINUED | OUTPATIENT
Start: 2020-12-01 | End: 2020-12-01 | Stop reason: HOSPADM

## 2020-12-01 RX ORDER — CLONIDINE HYDROCHLORIDE 0.1 MG/1
0.1 TABLET ORAL
Status: DISCONTINUED | OUTPATIENT
Start: 2020-12-01 | End: 2020-12-01 | Stop reason: HOSPADM

## 2020-12-01 RX ORDER — ENOXAPARIN SODIUM 100 MG/ML
40 INJECTION SUBCUTANEOUS DAILY
Qty: 7 SYRINGE | Refills: 0 | Status: SHIPPED
Start: 2020-12-02 | End: 2020-12-08

## 2020-12-01 RX ADMIN — ACETAMINOPHEN 650 MG: 325 TABLET ORAL at 01:04

## 2020-12-01 RX ADMIN — SODIUM CHLORIDE 40 MG: 9 INJECTION INTRAMUSCULAR; INTRAVENOUS; SUBCUTANEOUS at 09:00

## 2020-12-01 RX ADMIN — ACETAMINOPHEN 650 MG: 325 TABLET ORAL at 08:59

## 2020-12-01 RX ADMIN — ENOXAPARIN SODIUM 40 MG: 40 INJECTION SUBCUTANEOUS at 08:59

## 2020-12-01 RX ADMIN — AMLODIPINE BESYLATE 10 MG: 10 TABLET ORAL at 09:08

## 2020-12-01 RX ADMIN — ACETAMINOPHEN 650 MG: 325 TABLET ORAL at 13:57

## 2020-12-01 NOTE — PROGRESS NOTES
Patient discharge instructions given, PIV removed. Fluid challenge met and patient tolerated well. Patient to stop by outpatient pharmacy prior to discharge to  medications.

## 2020-12-01 NOTE — DISCHARGE INSTRUCTIONS
Patient was educated on all discharge instructions below. He/she understood and was provided a copy. He/she knows who to call for any issues post discharge. Hydration  Hydration is your NUMBER ONE priority. Dehydration is the most common reason for readmission to the hospital. Dehydration occurs when  your body does not get enough fluid to keep it functioning at its best. Your body also requires fluid  to burn its stored fat calories for energy. Carry a bottle of water with you all day, even when you are away from home; remind yourself to  drink even if you dont feel thirsty. Drinking 64 ounces of fluid is your daily goal. You can tell if  youre getting enough fluid if youre making clear, light-colored urine five to 10 times per day. Signs of dehydration can be thirst, headache, hard stools or dizziness upon sitting or standing up. You should contact your surgeons office if you are unable to drink enough fluid to stay hydrated. --   [address] after Surgery   No lifting over 15 pounds for four weeks.  No driving while taking the pain medication (about seven to 10 days).  No tub baths, swimming or hot tubs until incisions are healed (about two weeks).  You may shower. Clean incisions daily /gently with soap and check incisions for signs of infection:  -- Redness around incision. -- Swelling at site. -- Drainage with an foul odor (pus). -- Increase tenderness around incision.  Take your temperature and resting pulse in the morning and evening. Record on tracking form  given to you. Call if your temperature is greater than 101 or your pulse rate is greater than 115.  Please contact your surgeon if you are having excessive abdominal pain (that lasts longer than  four hours and does not improve with prescribed pain medication), vomiting or shortness of breath.  Get up and move -- do not sit in one place for more than an hour.    You need to WALK (EXERCISE) for 30 minutes per day. -- Walking around your house does not count. -- Bike, treadmill and elliptical are OK. -- NO weight lifting or sit ups.  If constipated take an adult dose of Miralax (available over the counter). Contact the doctors  office if Miralax doesnt help.  You may swallow pills starting the day after surgery as long as they fit inside this [place]:   Continue to use your incentive spirometer (breathing machine) for the next couple of weeks to  help prevent pneumonia. [address]  Temperature/Heart Rate Log  Take your temperature and heart rate (pulse) twice a day for 14 days. Take both in the morning and  evening at about the same time each day (when you wake up and before you go to bed when you  are relaxed). Please contact your doctors office if your:   Temperature is higher than 101 degrees.  Heart rate (pulse) is higher than 115 beats per minute  (normal heart rate is 60 to 100 beats per minute). How to Take Your Heart Rate (Pulse)   Turn your left hand so that your palm is face-up.  With the index and middle fingers of your right  hand, draw a line from the base of your thumb to  just below the crease in your wrist. Your fingers  should nestle just to the left of the large tendon that  pops up when you bend your wrist toward you.  Dont press too hard, that will make the pulse go  away. Use gentle pressure.  Wait. It can take several seconds -- and several micro-adjustments in the placement of your two  fingers on your wrist -- to find your pulse. Just keep moving your fingers down or up your wrist  in small increments (and pausing for a few seconds) until you find it. How to Take Your Pulse Rate   Find a watch with a second hand and place it on your right wrist or on the table next  to your left hand.  After finding your pulse, count the number of beats for 20 seconds.    Multiply by three to get your heart rate, or beats per minute  (or just count for 60 seconds for a math-free option).  Normal, resting heart rate is about 60 to 100 beats per minute. -- 55 --  PATIENT GUIDE TO [address]  Lovenox Self Injection Guide  Prepare  Step 1: Wash and dry your hands thoroughly. Step 2: Sit or lie in a comfortable position and choose an area  on the right or left side of the abdomen at least two inches away  from the belly button. Step 3: Clean the injection site with an alcohol swab and let dry. Inject  Step 4: Remove the needle cap by pulling it straight off the syringe and  discard it in a sharps . Step 5: With your other hand, pinch an inch of the cleansed area to  make a fold in the skin. Insert the full length of the needle straight  down -- at a 90? angle -- into the fold of skin. -- 50 --  PATIENT GUIDE TO [address]  Step 6: Press the plunger with your thumb until the syringe is empty. Then pull the needle straight out and release the skin fold. Dispose  Step 7: Point the needle down and away from yourself and others,  and then push down on the plunger to activate the safety shield. Step 8: Place the used syringe in the sharps . Do NOT expel the air bubble from the syringe before the injection. Administration should be alternated between the left and right abdominal wall. The whole length  of the needle should be introduced into a skin fold held between the thumb and forefinger; the  skin fold should be held throughout the injection. To minimize bruising, do not rub the injection  site after completion of the injection. Questions about LOVENOX? Call [phone]  -- 49 --  PATIENT GUIDE TO [address] [place] [address]  9.  DIET AND LIFESTYLE  Key Diet Principles Following Bariatric Surgery   Begin each meal with soft moist high protein foods (i.e. chicken, turkey, yogurt, tuna, eggs,  cottage cheese, other fish and seafood).  Consume a minimum of 64 ounces of fluid each day to prevent dehydration. No straws.  No food and fluid together. Stop drinking 30 minutes before a meal. You may begin fluids again  30 minutes after you finish a meal.   Eat very slowly and chew all foods completely.  Keep portions small.  No simple sugars or high fat foods. No carbonated beverages. No caffeine.  Eat three meals per day. No skipping. Avoid snacking between meals.  No alcohol. No smoking.  Two Flintstones Complete Chewable vitamins each day. Take one in the morning and one at night.  1,500 milligrams Calcium Citrate per day in separate dosages.  Vitamin D 3: 5,000 IU taken per day.  Vitamin B-12: Take 1,000 micrograms sublingually daily.  Iron: 60 milligrams per day from [facility].  Protein supplements of your choice. Must be low sugar (0 to 3 grams), low fat (0 to 3 grams) and  provide at least 35 to 40 grams of protein each day. You need 60 to 70 grams of protein (food  and supplements) each day.  Minimum of 30 minutes of physical activity daily. Do not matt NSAIDS . Do not take Steroids without your surgeons permission. -- 48 --  [address] [facility]  Your Priorities After Surgery  ? Fluid: 64 ounces of fluid per day. ? Protein: 60 to 70 grams of protein per day. ? Walk every day. Clear Liquid Diet  One week of clear liquids: minimum of 64 ounces of fluid per day. Fluid:   Zero calorie liquids.  No caffeine.  No carbonation.  No sugary drinks.  No alcohol.  No straws. Food   Protein drinks.  Less than 3 grams of sugar and  3 grams of fat per serving.  Protein drink should provide you with  60 to 70 grams of protein. Soft Protein Diet  Five weeks of soft protein (1 ounce for soft protein, 3 ounces of yogurt/cottage cheese). Three meals per day and 1 protein shake.  Protein shakes should provide you with 30 grams of  protein on the soft protein diet. Slow transition:   First week on soft protein diet -- focus on yogurt, cottage cheese, eggs, vegetarian refried beans,  black beans, kidney beans and white beans. (NO BAKED BEANS.)   Second through fourth week on soft protein diet -- focus on yogurt, cottage cheese, eggs,  canned tuna, canned chicken, tilapia and fish (needs to be soft enough to be cut up with a fork).  Fifth week on soft protein diet -- focus on yogurt, cottage cheese, eggs, canned tuna, canned  chicken, tilapia, fish, salmon, chicken breast or turkey. Fluid is your #1 Priority! Continue clear liquids between meals. You will need 64 ounces of fluid per day. Fluids that you can have include:   Water.  Zero calorie liquids. You will need to sip throughout the day and should therefore have a water bottle with you at all  times! No liquids with your meals. Stop 30 minutes before a meal and wait 30 minutes after a meal.  No straws. Zero calorie liquids. No caffeine. No carbonation. No sugary drinks. No alcohol. -- 46 --  60 [facility]  Protein  You will need 60 to 70 grams of protein per day.  60 to 70 grams of protein shakes when on the clear liquid diet (two to three shakes per day).  30 to 50 grams of protein shakes when on the soft protein diet (one shake per day). Eat Three Times Per Day  You will need to eat three times per day. My planned times are:  _________________________________________________________  _________________________________________________________  _________________________________________________________  Nausea, Vomiting, Stomach Pain  If you have problems with nausea, vomiting or stomach pain, try:   Eating slowly: 20 to 30 minutes per meal.   Chewing food thoroughly: 20 to 30 chews before food is swallowed.  Small portions: measure portions in medicine cup.  Stopping before feeling full.    AVOIDING SUGAR and FRIED FOOD: sugar will cause dumping syndrome and lead to weight gain. Exercise  I will need to get a minimum of 30 minutes of exercise per day or 150 minutes of exercise per week.  Walking, swimming, biking or elliptical.   Find something you enjoy! Vitamins  After surgery, you will need to take the following vitamins for the rest of your life -- FOREVER.  Vitamin D 3: 5,000 IU per day.  Calcium Citrate: 1,500 milligrams, taken separately.  Flintstones Complete: two per day, taken separately.  Sublingual Vitamin B-12: 1,000 micrograms daily.  Iron for menstruating women or patients with a history of low iron: 65 milligrams daily. We recommend going to [email or web address].brage. [employer] and purchasing iron from there. The lemon-lime has 60 milligrams. This iron is better absorbed than over-the-counter iron. -- 46 --  PATIENT GUIDE TO BARIATRIC [address] [place] [address] Rd  Clear Liquid Log  Getting your fluid in is top priority during this week. Fluids (MINIUM of 64 ounces per day):  ? 8 oz. ? 8 oz. ? 8 oz. ? 8 oz. ? 8 oz. ? 8 oz. ? 8 oz. ? 8 oz. ? 8 oz. ? 8 oz. ? 8 oz. ? 8 oz. Flintstones Complete Chewable: ? a.m. ? p.m. Calcium Citrate (1,500 milligrams/day):  Pill form  ? Two crushed pills (morning) ? Two crushed pills (afternoon) ? Two crushed pills (evening)  OR Upcal D (powder)  ? One pack/scoop ? One pack/scoop ? One pack/scoop  OR Celebrate Chewable Vitamins (500 mg each) or Bariatric Advantage Chewables (500 mg)  ? One chewable (morning) ? One chewable (afternoon) ? One chewable (evening)  OR Liquid Calcium Citrate  ? 1 tbsp. Calcium Citrate ? 1 tbsp. Calcium Citrate ? 1 tbsp. Calcium Citrate  Vitamin D3: ? 5,000 IU daily. Vitamin B-12: ? 1,000 micrograms per day. Iron (menstruating women or patients with a history of low iron):  ? 60 milligrams per day from [facility] [employer]. Protein drinks (protein drinks should be under 3 grams of sugar and 3 grams of fat).   Protein shake (60 grams per day): ? a.m. ? p.m. Exercise: ? 30 to 40 minutes per day. -- 48 --  PATIENT GUIDE TO BARIATRIC [address]  Bariatric Soft and Moist Diet Shopping List   alcium Citrate (1,500 milligrams/day):  Pill form  ? Two crushed pills (morning) ? Two crushed pills (afternoon) ? Two crushed pills (evening)  OR Upcal D (powder)  ? One pack/scoop ? One pack/scoop ? One pack/scoop  OR Celebrate Chewable Vitamins (500 mg each) or Bariatric Advantage Chewables (500 mg)  ? One chewable (morning) ? One chewable (afternoon) ? One chewable (evening)  OR Liquid Calcium Citrate  ? 1 tbsp. Calcium Citrate ? 1 tbsp. Calcium Citrate ? 1 tbsp. Calcium Citrate  Vitamin D3: ? 5,000 IU daily  Vitamin B-12: ? 1,000 micrograms per day  Iron (menstruating women or  patients with a history of low iron):  ? 60 milligrams per day  from [facility] [employer]  Protein drinks (protein drinks should be under  3 grams of sugar and 3 grams of fat). Protein shake (30 to 40 grams per day):  ? a.m. ? p.m. Exercise: ? 30 to 40 minutes per  Educated on Diet Progression    [facility] Gastric Bypass and Sleeve Dietary Progression    Patient Name:   Date of Surgery: Ice Chips start once admitted on floor. Begin Bariatric Clear Liquid Diet on:     Clear Liquid Diet: 64 oz. of fluid per day  o Low calorie, low sugar, non-carbonated beverages  - Water, Crystal Light, Propel Water, Sugar Free Jell-O, Sugar Free Popsicles, Bouillon  - Start protein supplement during this stage. (60-70 grams per day)  - Getting your fluid in and staying hydrated is your #1 priority! - The clear liquid diet will last for 7 days. Begin Bariatric Soft and Moist on:   - This stage of the diet will last for 5 weeks, unless otherwise instructed by your surgeon.   - Begin:  1 week post-op   - End:  6 weeks post-op (or when you follow up with the Registered Dietitian)    - Soft, moist, high protein foods: 3 meals per day plus protein supplements. o   Portions should emphasize on soft protein. o Portions will be a MAXIMUM of:  o  1 ounce of solid food  o  2-3 ounces of cottage cheese and yogurt. o Protein supplements should be between meals and provide 30-40 grams per day during soft protein diet. o Continue to get 64 ounces of fluid in per day. - Protein foods that are ok on the Soft and Moist Diet include:  o Slow transition:  o 1st week on soft protein should focus on: Yogurt, cottage cheese, eggs  o 2nd -4th  week on soft protein diet should focus on: yogurt, cottage cheese, eggs, canned tuna, canned chicken, tilapia, fish (needs to be soft enough to be cut up with a fork)  o 5th week on soft protein diet should focus on: Yogurt, cottage cheese, eggs, canned tuna, canned chicken, tilapia, fish, salmon, chicken breast, or turkey. Remember to continue to get 64 ounces of fluid daily on ALL Stages. To be advanced to Bariatric Maintenance Stage of the bariatric diet, follow up with the dietitian 6 weeks post-op, around:         For any additional questions, please refer to your blue binder that was provided to you at the start of the bariatric program.

## 2020-12-01 NOTE — PROGRESS NOTES
Problem: Patient Education: Go to Patient Education Activity  Goal: Patient/Family Education  Outcome: Progressing Towards Goal     Problem: Laparoscopic Gastric Bypass:Day of Surgery  Goal: Off Pathway (Use only if patient is Off Pathway)  Outcome: Progressing Towards Goal  Goal: Activity/Safety  Outcome: Progressing Towards Goal  Goal: Consults, if ordered  Outcome: Progressing Towards Goal  Goal: Diagnostic Test/Procedures  Outcome: Progressing Towards Goal  Goal: Nutrition/Diet  Outcome: Progressing Towards Goal  Goal: Medications  Outcome: Progressing Towards Goal  Goal: Respiratory  Outcome: Progressing Towards Goal  Goal: Treatments/Interventions/Procedures  Outcome: Progressing Towards Goal  Goal: Psychosocial  Outcome: Progressing Towards Goal  Goal: *No signs and symptoms of infection or wound complications  Outcome: Progressing Towards Goal  Goal: *Optimal pain control at patient's stated goal  Outcome: Progressing Towards Goal  Goal: *Adequate urinary output (equal to or greater than 30 milliliters/hour)  Outcome: Progressing Towards Goal  Goal: *Hemodynamically stable  Outcome: Progressing Towards Goal  Goal: *Tolerating diet  Outcome: Progressing Towards Goal  Goal: *Demonstrates progressive activity  Outcome: Progressing Towards Goal  Goal: *Absence of signs and symptoms of DVT  Outcome: Progressing Towards Goal  Goal: *Labs within defined limits  Outcome: Progressing Towards Goal  Goal: *Oxygen saturation within defined limits  Outcome: Progressing Towards Goal     Problem: Laparoscopic Gastric Bypass:Post-Op Day 1  Goal: Off Pathway (Use only if patient is Off Pathway)  Outcome: Progressing Towards Goal  Goal: Activity/Safety  Outcome: Progressing Towards Goal  Goal: Diagnostic Test/Procedures  Outcome: Progressing Towards Goal  Goal: Nutrition/Diet  Outcome: Progressing Towards Goal  Goal: Discharge Planning  Outcome: Progressing Towards Goal  Goal: Medications  Outcome: Progressing Towards Goal  Goal: Respiratory  Outcome: Progressing Towards Goal  Goal: Treatments/Interventions/Procedures  Outcome: Progressing Towards Goal  Goal: Psychosocial  Outcome: Progressing Towards Goal  Goal: *No signs and symptoms of infection or wound complications  Outcome: Progressing Towards Goal  Goal: *Optimal pain control at patient's stated goal  Outcome: Progressing Towards Goal  Goal: *Adequate urinary output (equal to or greater than 30 milliliters/hour)  Outcome: Progressing Towards Goal  Goal: *Hemodynamically stable  Outcome: Progressing Towards Goal  Goal: *Tolerating diet  Outcome: Progressing Towards Goal  Goal: *Demonstrates progressive activity  Outcome: Progressing Towards Goal  Goal: *Absence of signs and symptoms of DVT  Outcome: Progressing Towards Goal  Goal: *Labs within defined limits  Outcome: Progressing Towards Goal  Goal: *Oxygen saturation within defined limits  Outcome: Progressing Towards Goal  Goal: *Upper GI series/No leak identified  Outcome: Progressing Towards Goal     Problem: Laparoscopic Gastric Bypass:Post-Op Day 2  Goal: Off Pathway (Use only if patient is Off Pathway)  Outcome: Progressing Towards Goal  Goal: Activity/Safety  Outcome: Progressing Towards Goal  Goal: Diagnostic Test/Procedures  Outcome: Progressing Towards Goal  Goal: Nutrition/Diet  Outcome: Progressing Towards Goal  Goal: Discharge Planning  Outcome: Progressing Towards Goal  Goal: Medications  Outcome: Progressing Towards Goal  Goal: Respiratory  Outcome: Progressing Towards Goal  Goal: Treatments/Interventions/Procedures  Outcome: Progressing Towards Goal  Goal: Psychosocial  Outcome: Progressing Towards Goal     Problem: Laparoscopic Gastric Bypass:Discharge Outcomes  Goal: *Active bowel sounds  Outcome: Progressing Towards Goal  Goal: *Absence of signs and symptoms of DVT  Outcome: Progressing Towards Goal  Goal: *Hemodynamically stable  Outcome: Progressing Towards Goal  Goal: *Lungs clear or at baseline  Outcome: Progressing Towards Goal  Goal: *Demonstrates independent activity or return to baseline  Outcome: Progressing Towards Goal  Goal: *Optimal pain control at patient's stated goal  Outcome: Progressing Towards Goal  Goal: *Verbalizes understanding and describes prescribed diet  Outcome: Progressing Towards Goal  Goal: *Tolerating diet  Outcome: Progressing Towards Goal  Goal: *Verbalizes name, dosage, time, side effects, and number of days to continue medications  Outcome: Progressing Towards Goal  Goal: *No signs and symptoms of infection or wound complications  Outcome: Progressing Towards Goal  Goal: *Anxiety reduced or absent  Outcome: Progressing Towards Goal  Goal: *Understands and describes signs and symptoms to report to providers (eg: s/s of leak, DVT; inability to tolerate diet)  Outcome: Progressing Towards Goal  Goal: *Describes follow-up/return visits to physicians  Outcome: Progressing Towards Goal  Goal: *Describes available resources and support systems  Outcome: Progressing Towards Goal     Problem: Pain  Goal: *Control of Pain  Outcome: Progressing Towards Goal  Goal: *PALLIATIVE CARE:  Alleviation of Pain  Outcome: Progressing Towards Goal     Problem: Patient Education: Go to Patient Education Activity  Goal: Patient/Family Education  Outcome: Progressing Towards Goal     Problem: Falls - Risk of  Goal: *Absence of Falls  Description: Document Maggie Fall Risk and appropriate interventions in the flowsheet.   Outcome: Progressing Towards Goal  Note: Fall Risk Interventions:            Medication Interventions: Patient to call before getting OOB, Teach patient to arise slowly                   Problem: Patient Education: Go to Patient Education Activity  Goal: Patient/Family Education  Outcome: Progressing Towards Goal

## 2020-12-01 NOTE — DISCHARGE SUMMARY
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     POSTOPERATIVE DIAGNOSES:  1. Intra-abdominal adhesive disease. 2.  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 3.  Hepatic steatosis. 4.  Incarcerated umbilical hernia.     PROCEDURES PERFORMED:  1. Laparoscopic lysis of adhesions. 2.  Laparoscopic Khushboo-en-Y gastric bypass utilizing a 15 mL separate tubularized gastric pouch placed on the lesser curvature of the stomach, a 391-FH retrocolic retrogastric Khushboo limb, and a 40-cm biliopancreatic limb. 3.  Laparoscopic left hepatic lobe wedge biopsy. 4.  Laparoscopic primary repair of a 3-cm umbilical fascial hernia defect.     Postop Complications: none    Hospital Course:  Patient was admitted on 11/30/2020 for scheduled bariatric surgery. Operation was without significant complication. Patient admitted to the floor postoperatively, monitored as per protocol. Diet sequentially advanced beginning POD 1, pain medications transitioned to oral during the hospital course. Currently the patient is afebrile, vital signs stable, tolerating a clear liquid diet with protein supplementation, voiding spontaneously, ambulatory with adequate pain control with oral medications and clear surgical sites without evidence of infection.     Discharge Diet:  Clear Liquid Bariatric Diet for 7 days, then soft moist protein diet for 5 weeks    Discharge Medications:  Current Discharge Medication List      START taking these medications    Details   oxyCODONE IR (ROXICODONE) 5 mg immediate release tablet Take 1 Tab by mouth every four (4) hours as needed for Pain for up to 3 days. Max Daily Amount: 30 mg.  Qty: 18 Tab, Refills: 0    Associated Diagnoses: Post-op pain; Incisional pain      ondansetron (ZOFRAN ODT) 4 mg disintegrating tablet Take 1 Tab by mouth every eight (8) hours as needed for Nausea or Vomiting. Indications: prevent nausea and vomiting after surgery  Qty: 12 Tab, Refills: 0         CONTINUE these medications which have CHANGED    Details   enoxaparin (LOVENOX) 40 mg/0.4 mL 0.4 mL by SubCUTAneous route daily. Indications: deep vein thrombosis prevention, treatment to prevent a blood clot in the lung  Qty: 7 Syringe, Refills: 0         CONTINUE these medications which have NOT CHANGED    Details   cholecalciferol, vitamin D3, (Vitamin D3) 50 mcg (2,000 unit) tab Take 4,000 Units by mouth. amLODIPine (NORVASC) 10 mg tablet Take 1 tablet every day by oral route. buPROPion SR (WELLBUTRIN SR) 100 mg SR tablet Take 1 tablet twice a day by oral route for 30 days. cloNIDine HCL (CATAPRES) 0.1 mg tablet Take 1 tablet every day by oral route at dinner. ALPRAZolam (XANAX) 0.25 mg tablet Take 1 tablet twice a day by oral route as needed for 30 days.          STOP taking these medications       ferrous sulfate 325 mg (65 mg iron) tablet Comments:   Reason for Stopping:         potassium chloride (K-DUR, KLOR-CON) 20 mEq tablet Comments:   Reason for Stopping:         atenoloL-chlorthalidone (TENORETIC) 100-25 mg per tablet Comments:   Reason for Stopping:         diclofenac (VOLTAREN) 1 % gel Comments:   Reason for Stopping:         pantoprazole (PROTONIX) 40 mg tablet Comments:   Reason for Stopping:                 Bariatric Chewable vitamins, 2 orally daily for life  Calcium Citrate 1500 mg orally daily for life  Vitamin B12 1000 micrograms sublingual daily for life  Vitamin D3 5,000 units orally daily for life   Vitamin B1 100 mg orally daily for life    Discharge disposition: home    Discharge condition: stable      Local wound care with daily showers, keep wounds clean and dry     Activity: as desired, no lifting, pushing, or pulling greater than 15lbs or situps for 30 days     Special Instructions:            - No driving until activity is not influenced by incisional pain and off narcotics            - No bath or hot tub until wounds are healed            - Check pulse and temperature twice daily for 10 days            - Notify [facility] for a Temp >100.5 or Pulse>115     Follow-up with your surgeon in 2 weeks, call office for appointment [phone] ([address]) [address] [facility])

## 2020-12-01 NOTE — PROGRESS NOTES
Patient was educated on self injection of Lovenox. 1. Wash and dry hands  2. Sit or lie in a comfortable position so yo can see you abdomin. 3. Choose an area on the right or left side. Think Love handles away from belly button. 4. Clean site with alcohol swab and let dry  5. Remove the needle cap by pulling it straight off the syringe. Discard cap  6. Hold the syringe like a pencil in your writing hand. 7. With your other hand pinch an inch of the cleaned skin. Insert full length of needle straight at a 90 degree angle  8. Press plunger with thumb until empty  9. Pull the needle straight out  10. Point needle down away from yourself and others. Push down on plunger to activate safety shield  11. Place in plastic container (dish wash bottle etc  12.  Place in trash

## 2020-12-01 NOTE — PROGRESS NOTES
Patient was educated on all discharge instructions below. He/she understood and was provided a copy. He/she knows who to call for any issues post discharge. Hydration  Hydration is your NUMBER ONE priority. Dehydration is the most common reason for readmission to the hospital. Dehydration occurs when  your body does not get enough fluid to keep it functioning at its best. Your body also requires fluid  to burn its stored fat calories for energy. Carry a bottle of water with you all day, even when you are away from home; remind yourself to  drink even if you dont feel thirsty. Drinking 64 ounces of fluid is your daily goal. You can tell if  youre getting enough fluid if youre making clear, light-colored urine five to 10 times per day. Signs of dehydration can be thirst, headache, hard stools or dizziness upon sitting or standing up. You should contact your surgeons office if you are unable to drink enough fluid to stay hydrated. --   [address] after Surgery   No lifting over 15 pounds for four weeks.  No driving while taking the pain medication (about seven to 10 days).  No tub baths, swimming or hot tubs until incisions are healed (about two weeks).  You may shower. Clean incisions daily /gently with soap and check incisions for signs of infection:  -- Redness around incision. -- Swelling at site. -- Drainage with an foul odor (pus). -- Increase tenderness around incision.  Take your temperature and resting pulse in the morning and evening. Record on tracking form  given to you. Call if your temperature is greater than 101 or your pulse rate is greater than 115.  Please contact your surgeon if you are having excessive abdominal pain (that lasts longer than  four hours and does not improve with prescribed pain medication), vomiting or shortness of breath.  Get up and move -- do not sit in one place for more than an hour.    You need to WALK (EXERCISE) for 30 minutes per day. -- Walking around your house does not count. -- Bike, treadmill and elliptical are OK. -- NO weight lifting or sit ups.  If constipated take an adult dose of Miralax (available over the counter). Contact the doctors  office if Miralax doesnt help.  You may swallow pills starting the day after surgery as long as they fit inside this [place]:   Continue to use your incentive spirometer (breathing machine) for the next couple of weeks to  help prevent pneumonia. [address]  Temperature/Heart Rate Log  Take your temperature and heart rate (pulse) twice a day for 14 days. Take both in the morning and  evening at about the same time each day (when you wake up and before you go to bed when you  are relaxed). Please contact your doctors office if your:   Temperature is higher than 101 degrees.  Heart rate (pulse) is higher than 115 beats per minute  (normal heart rate is 60 to 100 beats per minute). How to Take Your Heart Rate (Pulse)   Turn your left hand so that your palm is face-up.  With the index and middle fingers of your right  hand, draw a line from the base of your thumb to  just below the crease in your wrist. Your fingers  should nestle just to the left of the large tendon that  pops up when you bend your wrist toward you.  Dont press too hard, that will make the pulse go  away. Use gentle pressure.  Wait. It can take several seconds -- and several micro-adjustments in the placement of your two  fingers on your wrist -- to find your pulse. Just keep moving your fingers down or up your wrist  in small increments (and pausing for a few seconds) until you find it. How to Take Your Pulse Rate   Find a watch with a second hand and place it on your right wrist or on the table next  to your left hand.  After finding your pulse, count the number of beats for 20 seconds.    Multiply by three to get your heart rate, or beats per minute  (or just count for 60 seconds for a math-free option).  Normal, resting heart rate is about 60 to 100 beats per minute. -- 55 --  PATIENT GUIDE TO [address]  Lovenox Self Injection Guide  Prepare  Step 1: Wash and dry your hands thoroughly. Step 2: Sit or lie in a comfortable position and choose an area  on the right or left side of the abdomen at least two inches away  from the belly button. Step 3: Clean the injection site with an alcohol swab and let dry. Inject  Step 4: Remove the needle cap by pulling it straight off the syringe and  discard it in a sharps . Step 5: With your other hand, pinch an inch of the cleansed area to  make a fold in the skin. Insert the full length of the needle straight  down -- at a 90? angle -- into the fold of skin. -- 50 --  PATIENT GUIDE TO [address]  Step 6: Press the plunger with your thumb until the syringe is empty. Then pull the needle straight out and release the skin fold. Dispose  Step 7: Point the needle down and away from yourself and others,  and then push down on the plunger to activate the safety shield. Step 8: Place the used syringe in the sharps . Do NOT expel the air bubble from the syringe before the injection. Administration should be alternated between the left and right abdominal wall. The whole length  of the needle should be introduced into a skin fold held between the thumb and forefinger; the  skin fold should be held throughout the injection. To minimize bruising, do not rub the injection  site after completion of the injection. Questions about LOVENOX? Call [phone]  -- 49 --  PATIENT GUIDE TO [address] [place] [address]  9.  DIET AND LIFESTYLE  Key Diet Principles Following Bariatric Surgery   Begin each meal with soft moist high protein foods (i.e. chicken, turkey, yogurt, tuna, eggs,  cottage cheese, other fish and seafood).  Consume a minimum of 64 ounces of fluid each day to prevent dehydration. No straws.  No food and fluid together. Stop drinking 30 minutes before a meal. You may begin fluids again  30 minutes after you finish a meal.   Eat very slowly and chew all foods completely.  Keep portions small.  No simple sugars or high fat foods. No carbonated beverages. No caffeine.  Eat three meals per day. No skipping. Avoid snacking between meals.  No alcohol. No smoking.  Two Flintstones Complete Chewable vitamins each day. Take one in the morning and one at night.  1,500 milligrams Calcium Citrate per day in separate dosages.  Vitamin D 3: 5,000 IU taken per day.  Vitamin B-12: Take 1,000 micrograms sublingually daily.  Iron: 60 milligrams per day from [facility].  Protein supplements of your choice. Must be low sugar (0 to 3 grams), low fat (0 to 3 grams) and  provide at least 35 to 40 grams of protein each day. You need 60 to 70 grams of protein (food  and supplements) each day.  Minimum of 30 minutes of physical activity daily. Do not matt NSAIDS . Do not take Steroids without your surgeons permission. -- 48 --  [address]  Your Priorities After Surgery  ? Fluid: 64 ounces of fluid per day. ? Protein: 60 to 70 grams of protein per day. ? Walk every day. Clear Liquid Diet  One week of clear liquids: minimum of 64 ounces of fluid per day. Fluid:   Zero calorie liquids.  No caffeine.  No carbonation.  No sugary drinks.  No alcohol.  No straws. Food   Protein drinks.  Less than 3 grams of sugar and  3 grams of fat per serving.  Protein drink should provide you with  60 to 70 grams of protein. Soft Protein Diet  Five weeks of soft protein (1 ounce for soft protein, 3 ounces of yogurt/cottage cheese). Three meals per day and 1 protein shake.  Protein shakes should provide you with 30 grams of  protein on the soft protein diet. Slow transition:   First week on soft protein diet -- focus on yogurt, cottage cheese, eggs, vegetarian refried beans,  black beans, kidney beans and white beans. (NO BAKED BEANS.)   Second through fourth week on soft protein diet -- focus on yogurt, cottage cheese, eggs,  canned tuna, canned chicken, tilapia and fish (needs to be soft enough to be cut up with a fork).  Fifth week on soft protein diet -- focus on yogurt, cottage cheese, eggs, canned tuna, canned  chicken, tilapia, fish, salmon, chicken breast or turkey. Fluid is your #1 Priority! Continue clear liquids between meals. You will need 64 ounces of fluid per day. Fluids that you can have include:   Water.  Zero calorie liquids. You will need to sip throughout the day and should therefore have a water bottle with you at all  times! No liquids with your meals. Stop 30 minutes before a meal and wait 30 minutes after a meal.  No straws. Zero calorie liquids. No caffeine. No carbonation. No sugary drinks. No alcohol. -- 46 --  [facility]  Protein  You will need 60 to 70 grams of protein per day.  60 to 70 grams of protein shakes when on the clear liquid diet (two to three shakes per day).  30 to 50 grams of protein shakes when on the soft protein diet (one shake per day). Eat Three Times Per Day  You will need to eat three times per day. My planned times are:  _________________________________________________________  _________________________________________________________  _________________________________________________________  Nausea, Vomiting, Stomach Pain  If you have problems with nausea, vomiting or stomach pain, try:   Eating slowly: 20 to 30 minutes per meal.   Chewing food thoroughly: 20 to 30 chews before food is swallowed.  Small portions: measure portions in medicine cup.  Stopping before feeling full.    AVOIDING SUGAR and FRIED FOOD: sugar will cause dumping syndrome and lead to weight gain. Exercise  I will need to get a minimum of 30 minutes of exercise per day or 150 minutes of exercise per week.  Walking, swimming, biking or elliptical.   Find something you enjoy! Vitamins  After surgery, you will need to take the following vitamins for the rest of your life -- FOREVER.  Vitamin D 3: 5,000 IU per day.  Calcium Citrate: 1,500 milligrams, taken separately.  Flintstones Complete: two per day, taken separately.  Sublingual Vitamin B-12: 1,000 micrograms daily.  Iron for menstruating women or patients with a history of low iron: 65 milligrams daily. We recommend going to [email or web address] and purchasing iron from there. The lemon-lime has 60 milligrams. This iron is better absorbed than over-the-counter iron. -- 46 --  PATIENT GUIDE TO BARIATRIC [address] [place] [address] Rd  Clear Liquid Log  Getting your fluid in is top priority during this week. Fluids (MINIUM of 64 ounces per day):  ? 8 oz. ? 8 oz. ? 8 oz. ? 8 oz. ? 8 oz. ? 8 oz. ? 8 oz. ? 8 oz. ? 8 oz. ? 8 oz. ? 8 oz. ? 8 oz. Flintstones Complete Chewable: ? a.m. ? p.m. Calcium Citrate (1,500 milligrams/day):  Pill form  ? Two crushed pills (morning) ? Two crushed pills (afternoon) ? Two crushed pills (evening)  OR Upcal D (powder)  ? One pack/scoop ? One pack/scoop ? One pack/scoop  OR Celebrate Chewable Vitamins (500 mg each) or [facility] Chewables (500 mg)  ? One chewable (morning) ? One chewable (afternoon) ? One chewable (evening)  OR Liquid Calcium Citrate  ? 1 tbsp. Calcium Citrate ? 1 tbsp. Calcium Citrate ? 1 tbsp. Calcium Citrate  Vitamin D3: ? 5,000 IU daily. Vitamin B-12: ? 1,000 micrograms per day. Iron (menstruating women or patients with a history of low iron):  ? 60 milligrams per day from [facility]. Protein drinks (protein drinks should be under 3 grams of sugar and 3 grams of fat).   Protein shake (60 grams per day): ? a.m. ? p.m. Exercise: ? 30 to 40 minutes per day. -- 48 --  PATIENT GUIDE TO BARIATRIC [address]  Bariatric Soft and Moist Diet Shopping List   alcium Citrate (1,500 milligrams/day):  Pill form  ? Two crushed pills (morning) ? Two crushed pills (afternoon) ? Two crushed pills (evening)  OR Upcal D (powder)  ? One pack/scoop ? One pack/scoop ? One pack/scoop  OR Celebrate Chewable Vitamins (500 mg each) or [facility] [employer]age Chewables (500 mg)  ? One chewable (morning) ? One chewable (afternoon) ? One chewable (evening)  OR Liquid Calcium Citrate  ? 1 tbsp. Calcium Citrate ? 1 tbsp. Calcium Citrate ? 1 tbsp. Calcium Citrate  Vitamin D3: ? 5,000 IU daily  Vitamin B-12: ? 1,000 micrograms per day  Iron (menstruating women or  patients with a history of low iron):  ? 60 milligrams per day  from [facility]  Protein drinks (protein drinks should be under  3 grams of sugar and 3 grams of fat). Protein shake (30 to 40 grams per day):  ? a.m. ? p.m. Exercise: ? 30 to 40 minutes per  Educated on Diet Progression    [facility] G[facility] Bypass [facility]lee[facility] Dietary Progression    Patient Name:   Date of Surgery: Ice Chips start once admitted on floor. Begin Bariatric Clear Liquid Diet on:     Clear Liquid Diet: 64 oz. of fluid per day  o Low calorie, low sugar, non-carbonated beverages  - Water, Crystal Light, Propel Water, Sugar Free Jell-O, Sugar Free Popsicles, Bouillon  - Start protein supplement during this stage. (60-70 grams per day)  - Getting your fluid in and staying hydrated is your #1 priority! - The clear liquid diet will last for 7 days. Begin Bariatric Soft and Moist on: 12/8  - This stage of the diet will last for 5 weeks, unless otherwise instructed by your surgeon.   - Begin:  1 week post-op   - End:  6 weeks post-op (or when you follow up with the Registered Dietitian)    - Soft, moist, high protein foods: 3 meals per day plus protein supplements. o   Portions should emphasize on soft protein. o Portions will be a MAXIMUM of:  o  1 ounce of solid food  o  2-3 ounces of cottage cheese and yogurt. o Protein supplements should be between meals and provide 30-40 grams per day during soft protein diet. o Continue to get 64 ounces of fluid in per day. - Protein foods that are ok on the Soft and Moist Diet include:  o Slow transition:  o 1st week on soft protein should focus on: Yogurt, cottage cheese, eggs  o 2nd -4th  week on soft protein diet should focus on: yogurt, cottage cheese, eggs, canned tuna, canned chicken, tilapia, fish (needs to be soft enough to be cut up with a fork)  o 5th week on soft protein diet should focus on: Yogurt, cottage cheese, eggs, canned tuna, canned chicken, tilapia, fish, salmon, chicken breast, or turkey. Remember to continue to get 64 ounces of fluid daily on ALL Stages. To be advanced to Bariatric Maintenance Stage of the bariatric diet, follow up with the dietitian 6 weeks post-op, around:         For any additional questions, please refer to your blue binder that was provided to you at the start of the bariatric program.

## 2020-12-01 NOTE — ROUTINE PROCESS
Bedside shift change report given to One Elis Fournier (oncoming nurse) by Marce Shepherd RN (offgoing nurse). Report included the following information SBAR, Kardex, Procedure Summary, Intake/Output, MAR and Recent Results.

## 2020-12-01 NOTE — OP NOTES
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 POSTOPERATIVE DIAGNOSES:  1. Intra-abdominal adhesive disease. 2.  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 3.  Hepatic steatosis. 4.  Incarcerated umbilical hernia. PROCEDURES PERFORMED:  1. Laparoscopic lysis of adhesions. 2.  Laparoscopic Khushboo-en-Y gastric bypass utilizing a 15 mL separate tubularized gastric pouch placed on the lesser curvature of the stomach, a 303-FW retrocolic retrogastric Khushboo limb, and a 40-cm biliopancreatic limb. 3.  Laparoscopic left hepatic lobe wedge biopsy. 4.  Laparoscopic primary repair of a 3-cm umbilical fascial hernia defect. SURGEON:  Sebas Velazquez DO    FIRST ASSISTANT:  Nessa Malone SA    ANESTHESIA:  General endotracheal supplemented at the conclusion of the operative procedure with local infiltration of the operative sites with 266 mg of Exparel diluted in 50 mL of normal saline solution. COMPLICATIONS: None. SPECIMENS REMOVED:  Left hepatic lobe wedge biopsy. IMPLANTS: None. ESTIMATED BLOOD LOSS:  Less than 25 mL. OPERATIVE FINDINGS:  The patient had hepatomegaly with morphologic appearance of hepatic steatosis. In addition, she had an incarcerated umbilical hernia containing omentum with a 3-cm fascial defect and had intra-abdominal adhesive disease extending from the umbilicus to symphysis pubis in the midline secondary to her previous hysterectomy.     INDICATION FOR SURGICAL PROCEDURE:  This is a 44-year-old black female who has failed medical management of her super, super obesity and after investigation of the surgical options for management, has elected to pursue definitive surgical intervention with laparoscopic potentially open technique. The patient in addition was considered for laparoscopic, potentially open left hepatic lobe wedge biopsy to definitively histologically characterize the preoperative right upper quadrant ultrasonographic findings of hepatomegaly with increased echogenicity consistent with hepatic steatosis. The risks and benefits of operative versus nonoperative potential alternatives and potential complications to include but not limited to undesired cosmetic result, conversion to the open procedure, wound hematoma, seroma or infection, incisional hernia, chronic abdominal pain, injury to any intra-abdominal or retroperitoneal structure, gastrojejunal anastomotic stricture, ulcer or leak, potential for unexpected surgical findings necessitating additional surgical intervention, DVT, PE, pneumonia, myocardial infarction, stroke, potentially death were all discussed in detail with the patient prior to the surgical procedure, who voiced her understanding and wished to proceed. DESCRIPTION OF PROCEDURE:  The patient received Ancef for antibiotic prophylaxis and Lovenox for DVT prophylaxis in the preoperative staging area. After signing her operative permit, which was properly witnessed, she was then transported to the operating room, where she was placed in the supine position on the operating table, and SCDs were placed and inflated prior to the induction of general endotracheal anesthesia. A 16-Togolese Shah catheter was then placed atraumatically to gravity drainage as was a 34-Togolese orogastric tube for gastric decompression, and the abdomen was then prepped and draped in the usual sterile fashion.   A time-out procedure was performed according to protocol, and agreed upon by all personnel in the operating suite. A transverse 12-mm cutaneous incision was then made 5 cm above the level of the umbilicus through which a 12-mm Optiview trocar and cannula were placed into the peritoneal cavity under direct vision utilizing a 10-mm 0-degree laparoscope. The laparoscope and trocar were removed. The abdomen was then insufflated with carbon dioxide gas never exceeding a pressure of 15 mmHg, and a 30-degree 10-mm laparoscope was placed and utilized for the remainder of the procedure. The remaining ports were then placed under direct vision through transverse cutaneous incisions utilizing Optiview trocars and cannulas. The second was placed through an incision in the left anterior axillary line two fingerbreadths below costal margin 5 mm in length, the third midway between the left upper quadrant and supraumbilical port through an incision 12 mm in length and the fourth through right paramedian incision 8 cm from the midline midway between the level of the costal margin and the umbilicus, 12 mm in length. After brief exploration of the upper abdomen which was notable for the presence of marked hepatomegaly with the morphologic appearance of hepatic steatosis and was otherwise unremarkable, the omentum and transverse colon were attempted to be reflected superiorly. This was unable to be accomplished secondary to intra-abdominal adhesive disease, and the laparoscope was then shifted to left midabdominal port to allow visualization of the lower abdomen which revealed intra-abdominal adhesive disease consisting of omental adhesions to the anterior abdominal wall, extending from just superior to the umbilicus down to the level of the symphysis pubis. The harmonic scalpel was utilized to dissect these adhesions free, and during this dissection, it was noted the patient had a 3-cm umbilical hernia fascial defect which was filled with incarcerated omentum.   This was reduced, and the remainder of lysis of adhesions down to symphysis pubis was accomplished. Laparoscope was shifted back to the supraumbilical port. The omentum and transverse colon were reflected superiorly. The ligament of Treitz was identified; 40 cm distal to ligament of Treitz, the jejunum was transected utilizing a triple-load stapling device 60 mm in length, loaded with 2.5-mm staples. The mesentery was then divided at this point down to its base utilizing the Harmonic scalpel. The Khushboo limb was measured to be 150 cm in length. At this point on its antimesenteric border, enterotomy was created with the Harmonic scalpel. A similar antimesenteric enterotomy was created 2 cm proximal to the staple line of the biliopancreatic limb, and stapled side-to-side functional end-to-side jejunojejunostomy was constructed utilizing a triple-load stapling device, 60 mm in length, introducing one arm of the stapling device into each of the respective limbs prior to firing it on the antimesenteric borders. The remaining enteric defect was then closed with a running full-thickness 3-0 Vicryl suture, and the mesenteric defect was closed with a running 2-0 silk suture. The ligament of Treitz was re-identified. Just anterior to the ligament of Treitz, a fenestration was created through the mesocolon into the lesser sac utilizing the  Harmonic scalpel, and the Khushboo limb was then placed through this mesocolic fenestration into the lesser sac. The omentum and transverse colon were reflected back to normal anatomic position. The jesus was identified along the lesser curvature of the stomach. Just anterior to the jesus along the greater curvature of the stomach, the omentum was  from the gastric wall utilizing the Harmonic scalpel until the Khushboo limb was visualized within the lesser sac.   A transverse 5-mm cutaneous incision was then made one-third the distance from the xiphisternal junction to the umbilicus in the midline through which a trocar without a cannula was placed into the peritoneal cavity under direct vision. This was withdrawn and replaced with a 5-mm atraumatic grasping forceps, which was utilized in conjunction with a self-retaining retractor to elevate the left lobe of the liver and provide hiatal exposure. The peritoneum overlying the angle of His was then opened with Harmonic scalpel. 5 cm distal to the GE junction along the lesser curvature of the stomach, the neurovascular elements of the lesser omentum were  from the gastric wall utilizing the Harmonic scalpel. Completion of this lesser curvature fenestration in the lesser sac was accomplished utilizing an esophageal retractor introduced through the omental fenestration along the greater curvature of the stomach posterior to the stomach. A 34-[place] citizen orogastric tube was then retracted into the esophagus. A triple-load stapling device 60 mm in length loaded with 3.5-mm staples was then introduced into the lesser curvature fenestration, oriented perpendicular to the lesser curve 5 cm distal to the GE junction prior to firing two-thirds of length of the staple load. The 34-[place] citizen orogastric tube was then advanced, and utilizing a sizing template for construction of the tubularized gastric pouch based on the lesser curvature of the stomach, the vertical aspect of the gastric pouch was initiated with a triple-load stapling device 60 mm in length loaded with 3.5 mm staples utilizing two-thirds of length of the staple load. The remainder of the gastric pouch was constructed with sequential firings of a triple-load stapling device 60 mm in length loaded with 3.5 mm staples, ending the transection at the angle of His. It should be noted that the initial full-length firing of the staple load utilized a Seamguard buttressing strip, and this then created a 15-mL tubularized gastric pouch based on the lesser curvature of the stomach.   The Khushboo limb was then elevated posterior to the stomach in the retrogastric position, where a tension-free hand-sewn two-layered gastrojejunostomy was constructed. The geometric orientation of the gastrojejunostomy was at the distal aspect of the tubularized gastric pouch to the antimesenteric border of the Khushboo limb 2 cm distal to the staple line. The posterior row of running seromuscular 3-0 Vicryl suture was placed. A transverse gastrotomy was made at the distal aspect of the tubularized gastric pouch 12 mm in length with an adjacent antimesenteric 12-mm Khushboo limb enterotomy, the running inner layer of full-thickness 3-0 Vicryl suture was initiated in the left lateral posterior aspect of the anastomosis running across the posterior aspect of the anastomosis around the right lateral aspect of the anastomosis in the anterior midline. A second full-thickness 3-0 Vicryl suture was initiated adjacent to the origin of the first and run around the left lateral aspect of the anastomosis in the anterior midline. The 34-K[place]ovan orogastric tube was advanced across the gastrojejunal anastomosis into the Khushboo limb prior to tying with running inner layer of full-thickness 3-0 Vicryl sutures together anteriorly, and the gastrojejunal anastomosis was completed anteriorly utilizing a running seromuscular 3-0 Vicryl suture. The 34-K[place] orogastric tube was removed, and after assuring there was adequate redundancy of the Khushboo limb above the  level of the mesocolon, the omentum and transverse colon were reflected superiorly. The space through which a Hendrix's hernia might occur was then closed with a running 2-0 silk suture and the mesocolic defect was closed with a series of simple interrupted 2-0 silk sutures. The omentum and transverse colon were reflected back to their normal anatomic position.   The Khushboo limb was noted to be viable with adequate density above the level of the mesocolon, there was no tension noted at the gastrojejunal anastomosis. The self-retaining retractor and atraumatic grasping forceps were then removed. The free edge of the left lobe of the liver was retracted in such a fashion so that a 1.5-cm wedge-shaped biopsy could be obtained sharply for definitive histologic characterization of the ultrasonographic findings. The specimen was submitted to Pathology in formalin, and hemostasis was then established with electrocautery. The laparoscope was then shifted to the left mid abdominal port to allow visualization of the supraumbilical fascial trocar defect, which was closed with a suture passing device and #2 Vicryl suture. The umbilical fascial defect was then repaired by making an additional incision 2 mm in length overlying the fascial defect and then closing the umbilical fascial defect in a primary fashion utilizing #2 Vicryl suture and a suture passer placed in a simple interrupted fashion. All operative sites were inspected and noted to be hemostatic. The abdomen was then desufflated off carbon dioxide gas. The trocars were removed under direct vision. The fascial sutures were tied. The wounds were irrigated with normal saline solution. Closure of the skin was accomplished with a series of simple interrupted buried deep dermal 4-0 Monocryl sutures. The wound was infiltrated with 266 mg of Exparel diluted with 50 mL of normal saline solution. Steri-Strips were applied as were sterile dressings. Sponge and needle counts were correct both during and at the completion of the procedure. The patient tolerated the procedure without operative complications, subsequently was extubated and transported to the recovery room in awake, alert, and stable condition. The estimated blood loss was less than 25 mL. The patient received 600 mL of crystalloid during the procedure and had a urine output of 100 mL. Operative start time was 0902, completion time was 1046.       DO ZEE [id number]  D: 11/30/2020 11:23  T:  11/30/2020 21:43  JOB #:  [id number]

## 2020-12-01 NOTE — PROGRESS NOTES
Problem: Patient Education: Go to Patient Education Activity  Goal: Patient/Family Education  12/1/2020 1345 by Randy Dorsey  Outcome: Resolved/Met  12/1/2020 0817 by Randy Dorsey  Outcome: Progressing Towards Goal     Problem: Laparoscopic Gastric Bypass:Day of Surgery  Goal: Off Pathway (Use only if patient is Off Pathway)  12/1/2020 1345 by Randy Dorsey  Outcome: Resolved/Met  12/1/2020 0817 by Radny Dorsey  Outcome: Progressing Towards Goal  Goal: Activity/Safety  12/1/2020 1345 by Randy Dorsey  Outcome: Resolved/Met  12/1/2020 0817 by Randy Dorsey  Outcome: Progressing Towards Goal  Goal: Consults, if ordered  12/1/2020 1345 by Randy Dorsey  Outcome: Resolved/Met  12/1/2020 0817 by Randy Dorsey  Outcome: Progressing Towards Goal  Goal: Diagnostic Test/Procedures  12/1/2020 1345 by Randy Dorsey  Outcome: Resolved/Met  12/1/2020 0817 by Randy Dorsey  Outcome: Progressing Towards Goal  Goal: Nutrition/Diet  12/1/2020 1345 by Randy Dorsey  Outcome: Resolved/Met  12/1/2020 0817 by Randy Dorsey  Outcome: Progressing Towards Goal  Goal: Medications  12/1/2020 1345 by Randy Dorsey  Outcome: Resolved/Met  12/1/2020 0817 by Randy Dorsey  Outcome: Progressing Towards Goal  Goal: Respiratory  12/1/2020 1345 by Randy Dorsey  Outcome: Resolved/Met  12/1/2020 0817 by Randy Dorsey  Outcome: Progressing Towards Goal  Goal: Treatments/Interventions/Procedures  12/1/2020 1345 by Randy Dorsey  Outcome: Resolved/Met  12/1/2020 0817 by Randy Dorsey  Outcome: Progressing Towards Goal  Goal: Psychosocial  12/1/2020 1345 by Randy Dorsey  Outcome: Resolved/Met  12/1/2020 0817 by Randy Dorsey  Outcome: Progressing Towards Goal  Goal: *No signs and symptoms of infection or wound complications  44/9/1867 1345 by Randy Dorsey  Outcome: Resolved/Met  12/1/2020 0817 by Randy Dorsey  Outcome: Progressing Towards Goal  Goal: *Optimal pain control at patient's stated goal  12/1/2020 1345 by Storm Pellet  Outcome: Resolved/Met  12/1/2020 0817 by Storm Pellet  Outcome: Progressing Towards Goal  Goal: *Adequate urinary output (equal to or greater than 30 milliliters/hour)  12/1/2020 1345 by Storm Pellet  Outcome: Resolved/Met  12/1/2020 0817 by Storm Pellet  Outcome: Progressing Towards Goal  Goal: *Hemodynamically stable  12/1/2020 1345 by Storm Pellet  Outcome: Resolved/Met  12/1/2020 0817 by Storm Pellet  Outcome: Progressing Towards Goal  Goal: *Tolerating diet  12/1/2020 1345 by Storm Pellet  Outcome: Resolved/Met  12/1/2020 0817 by Storm Pellet  Outcome: Progressing Towards Goal  Goal: *Demonstrates progressive activity  12/1/2020 1345 by Storm Pellet  Outcome: Resolved/Met  12/1/2020 0817 by Storm Pellet  Outcome: Progressing Towards Goal  Goal: *Absence of signs and symptoms of DVT  12/1/2020 1345 by Storm Pellet  Outcome: Resolved/Met  12/1/2020 0817 by Storm Pellet  Outcome: Progressing Towards Goal  Goal: *Labs within defined limits  12/1/2020 1345 by Storm Pellet  Outcome: Resolved/Met  12/1/2020 0817 by Storm Pellet  Outcome: Progressing Towards Goal  Goal: *Oxygen saturation within defined limits  12/1/2020 1345 by Storm Pellet  Outcome: Resolved/Met  12/1/2020 0817 by Storm Pellet  Outcome: Progressing Towards Goal     Problem: Laparoscopic Gastric Bypass:Post-Op Day 1  Goal: Off Pathway (Use only if patient is Off Pathway)  12/1/2020 1345 by Storm Pellet  Outcome: Resolved/Met  12/1/2020 0817 by Storm Pellet  Outcome: Progressing Towards Goal  Goal: Activity/Safety  12/1/2020 1345 by Storm Pellet  Outcome: Resolved/Met  12/1/2020 0817 by Storm Pellet  Outcome: Progressing Towards Goal  Goal: Diagnostic Test/Procedures  12/1/2020 1345 by Storm Pellet  Outcome: Resolved/Met  12/1/2020 0817 by Storm Pellet  Outcome: Progressing Towards Goal  Goal: Nutrition/Diet  12/1/2020 1345 by Shelah Gilford  Outcome: Resolved/Met  12/1/2020 0817 by Shelah Gilford  Outcome: Progressing Towards Goal  Goal: Discharge Planning  12/1/2020 1345 by Shelah Gilford  Outcome: Resolved/Met  12/1/2020 0817 by Shelah Gilford  Outcome: Progressing Towards Goal  Goal: Medications  12/1/2020 1345 by Shelah Gilford  Outcome: Resolved/Met  12/1/2020 0817 by Shelah Gilford  Outcome: Progressing Towards Goal  Goal: Respiratory  12/1/2020 1345 by Shelah Gilford  Outcome: Resolved/Met  12/1/2020 0817 by Shelah Gilford  Outcome: Progressing Towards Goal  Goal: Treatments/Interventions/Procedures  12/1/2020 1345 by Shelah Gilford  Outcome: Resolved/Met  12/1/2020 0817 by Shelah Gilford  Outcome: Progressing Towards Goal  Goal: Psychosocial  12/1/2020 1345 by Shelah Gilford  Outcome: Resolved/Met  12/1/2020 0817 by Shelah Gilford  Outcome: Progressing Towards Goal  Goal: *No signs and symptoms of infection or wound complications  26/3/5884 1345 by Shelah Gilford  Outcome: Resolved/Met  12/1/2020 0817 by Shelah Gilford  Outcome: Progressing Towards Goal  Goal: *Optimal pain control at patient's stated goal  12/1/2020 1345 by Shelah Gilford  Outcome: Resolved/Met  12/1/2020 0817 by Shelah Gilford  Outcome: Progressing Towards Goal  Goal: *Adequate urinary output (equal to or greater than 30 milliliters/hour)  12/1/2020 1345 by Shelah Gilford  Outcome: Resolved/Met  12/1/2020 0817 by Shelah Gilford  Outcome: Progressing Towards Goal  Goal: *Hemodynamically stable  12/1/2020 1345 by Shelah Gilford  Outcome: Resolved/Met  12/1/2020 0817 by Shelah Gilford  Outcome: Progressing Towards Goal  Goal: *[place]  12/1/2020 1345 by Shelah Gilford  Outcome: Resolved/Met  12/1/2020 0817 by Shelah Gilford  Outcome: Progressing Towards Goal  Goal: *Demonstrates progressive activity  12/1/2020 1345 by Shelah Gilford  Outcome: Resolved/Met  12/1/2020 0817 by Mariajose Coley  Outcome: Progressing Towards Goal  Goal: *Absence of signs and symptoms of DVT  12/1/2020 1345 by Mariajose Coley  Outcome: Resolved/Met  12/1/2020 0817 by Mariajose Coley  Outcome: Progressing Towards Goal  Goal: *Labs within defined limits  12/1/2020 1345 by Mariajose Coley  Outcome: Resolved/Met  12/1/2020 0817 by Mariajose Coley  Outcome: Progressing Towards Goal  Goal: *Oxygen saturation within defined limits  12/1/2020 1345 by Mariajose Coley  Outcome: Resolved/Met  12/1/2020 0817 by Mariajose Coley  Outcome: Progressing Towards Goal  Goal: *Upper GI series/No leak identified  12/1/2020 1345 by Mariajose Coley  Outcome: Resolved/Met  12/1/2020 0817 by Mariajose Coley  Outcome: Progressing Towards Goal     Problem: Laparoscopic Gastric Bypass:Post-Op Day 2  Goal: Off Pathway (Use only if patient is Off Pathway)  12/1/2020 1345 by Mariajose Coley  Outcome: Resolved/Met  12/1/2020 0817 by Mariajose Coley  Outcome: Progressing Towards Goal  Goal: Activity/Safety  12/1/2020 1345 by Mariajose Coley  Outcome: Resolved/Met  12/1/2020 0817 by Mariajose Coley  Outcome: Progressing Towards Goal  Goal: Diagnostic Test/Procedures  12/1/2020 1345 by Mariajose Coley  Outcome: Resolved/Met  12/1/2020 0817 by Mariajose Coley  Outcome: Progressing Towards Goal  Goal: Nutrition/Diet  12/1/2020 1345 by Mariajose Coley  Outcome: Resolved/Met  12/1/2020 0817 by Mariajose Coley  Outcome: Progressing Towards Goal  Goal: Discharge Planning  12/1/2020 1345 by Mariajose Coley  Outcome: Resolved/Met  12/1/2020 0817 by Mariajose Coley  Outcome: Progressing Towards Goal  Goal: Medications  12/1/2020 1345 by Mariajose Coley  Outcome: Resolved/Met  12/1/2020 0817 by Mariajose Coley  Outcome: Progressing Towards Goal  Goal: Respiratory  12/1/2020 1345 by Mariajose Coley  Outcome: Resolved/Met  12/1/2020 0817 by Mariajose Colye  Outcome: Progressing Towards Goal  Goal: Treatments/Interventions/Procedures  12/1/2020 1345 by Jhon Petersen  Outcome: Resolved/Met  12/1/2020 0817 by Jhon Petersen  Outcome: Progressing Towards Goal  Goal: Psychosocial  12/1/2020 1345 by Jhon Petersen  Outcome: Resolved/Met  12/1/2020 0817 by Jhon Petersen  Outcome: Progressing Towards Goal     Problem: Laparoscopic Gastric Bypass:Discharge Outcomes  Goal: *Active bowel sounds  12/1/2020 1345 by Jhon Petersen  Outcome: Resolved/Met  12/1/2020 0817 by Jhon Petersen  Outcome: Progressing Towards Goal  Goal: *Absence of signs and symptoms of DVT  12/1/2020 1345 by Jhon Petersen  Outcome: Resolved/Met  12/1/2020 0817 by Jhon Petersen  Outcome: Progressing Towards Goal  Goal: *Hemodynamically stable  12/1/2020 1345 by Jhon Petersen  Outcome: Resolved/Met  12/1/2020 0817 by Jhon Petersen  Outcome: Progressing Towards Goal  Goal: *Lungs clear or at baseline  12/1/2020 1345 by Jhon Petersen  Outcome: Resolved/Met  12/1/2020 0817 by Jhon Petersen  Outcome: Progressing Towards Goal  Goal: *Demonstrates independent activity or return to baseline  12/1/2020 1345 by Jhon Petersen  Outcome: Resolved/Met  12/1/2020 0817 by Jhon Petersen  Outcome: Progressing Towards Goal  Goal: *Optimal pain control at patient's stated goal  12/1/2020 1345 by Jhon Petersen  Outcome: Resolved/Met  12/1/2020 0817 by Jhon Petersen  Outcome: Progressing Towards Goal  Goal: *Verbalizes understanding and describes prescribed diet  12/1/2020 1345 by Jhon Petersen  Outcome: Resolved/Met  12/1/2020 0817 by Jhon Petersen  Outcome: Progressing Towards Goal  Goal: *Tolerating diet  12/1/2020 1345 by Jhon Petersen  Outcome: Resolved/Met  12/1/2020 0817 by Jhon Petersen  Outcome: Progressing Towards Goal  Goal: Bosie Moat name, dosage, time, side effects, and number of days to continue medications  12/1/2020 1345 by Jhon Petersen  Outcome: Resolved/Met  12/1/2020 0817 by Kristin Soto Bradly Morrison  Outcome: Progressing Towards Goal  Goal: *No signs and symptoms of infection or wound complications  53/6/7829 1345 by Sumanth Henson  Outcome: Resolved/Met  12/1/2020 0817 by Sumanth Henson  Outcome: Progressing Towards Goal  Goal: *Anxiety reduced or absent  12/1/2020 1345 by Sumanth Henson  Outcome: Resolved/Met  12/1/2020 0817 by Sumanth Henson  Outcome: Progressing Towards Goal  Goal: *Understands and describes signs and symptoms to report to providers (eg: s/s of leak, DVT; inability to tolerate diet)  12/1/2020 1345 by Sumanth Henson  Outcome: Resolved/Met  12/1/2020 0817 by Sumanth Henson  Outcome: Progressing Towards Goal  Goal: *Describes follow-up/return visits to physicians  12/1/2020 1345 by Sumanth Henson  Outcome: Resolved/Met  12/1/2020 0817 by Sumanth Henson  Outcome: Progressing Towards Goal  Goal: *Describes available resources and support systems  12/1/2020 1345 by Sumanth Henson  Outcome: Resolved/Met  12/1/2020 0817 by Sumanth Henson  Outcome: Progressing Towards Goal     Problem: Pain  Goal: *Control of Pain  12/1/2020 1345 by Sumanth Henson  Outcome: Resolved/Met  12/1/2020 0817 by Sumanth Henson  Outcome: Progressing Towards Goal  Goal: *PALLIATIVE CARE:  Alleviation of Pain  12/1/2020 1345 by Sumanth Henson  Outcome: Resolved/Met  12/1/2020 0817 by Sumanth Henson  Outcome: Progressing Towards Goal     Problem: Patient Education: Go to Patient Education Activity  Goal: Patient/Family Education  12/1/2020 1345 by Sumanth Henson  Outcome: Resolved/Met  12/1/2020 0817 by Sumanth Henson  Outcome: Progressing Towards Goal     Problem: Falls - Risk of  Goal: *Absence of Falls  Description: Document Maggie Fall Risk and appropriate interventions in the flowsheet.   12/1/2020 1345 by Sumanth Henson  Outcome: Resolved/Met  12/1/2020 0817 by Sumanth Henson  Outcome: Progressing Towards Goal  Note: Fall Risk Interventions:            Medication Interventions: Patient to call before getting OOB, Teach patient to arise slowly                   Problem: Patient Education: Go to Patient Education Activity  Goal: Patient/Family Education  12/1/2020 1345 by Macarena Hair  Outcome: Resolved/Met  12/1/2020 0817 by Macarena Hair  Outcome: Progressing Towards Goal

## 2020-12-01 NOTE — PROGRESS NOTES
Surgery Progress Note    12/1/2020    Admit Date: 11/30/2020    Subjective:   Patient has complaints of pain Pain is controlled with current plan. Patient has been ambulating in halls. She reports no nausea and no vomiting. Bowel Movements: None    Objective:     Patient Vitals for the past 24 hrs:   Temp Pulse Resp BP SpO2   12/01/20 0801 98.5 °F (36.9 °C) 80 16 (!) 164/90 100 %   12/01/20 0338 98.7 °F (37.1 °C) 80 17 (!) 156/89 99 %   11/30/20 2317 98.6 °F (37 °C) 83 17 (!) 148/87 97 %   11/30/20 1908 98.5 °F (36.9 °C) 88 16 (!) 167/93 96 %   11/30/20 1715 98.5 °F (36.9 °C) 96 16 (!) 155/93 99 %   11/30/20 1500 97.3 °F (36.3 °C) 95 16 (!) 145/79 100 %   11/30/20 1308 98 °F (36.7 °C) 90 19 (!) 154/90 100 %   11/30/20 1255 -- 91 23 -- 100 %   11/30/20 1238 98.6 °F (37 °C) 87 21 (!) 153/84 100 %   11/30/20 1228 -- 85 19 (!) 152/88 100 %   11/30/20 1221 97.6 °F (36.4 °C) 94 21 -- 100 %   11/30/20 1220 -- 97 25 -- 100 %   11/30/20 1218 -- 82 18 (!) 151/87 100 %   11/30/20 1208 -- 78 21 (!) 157/83 100 %   11/30/20 1158 -- 81 18 (!) 149/85 --   11/30/20 1148 -- 74 25 (!) 145/77 92 %   11/30/20 1138 -- 77 18 (!) 144/79 98 %   11/30/20 1128 -- 70 23 (!) 148/73 100 %   11/30/20 1118 -- 70 24 138/75 100 %   11/30/20 1116 -- 70 18 117/76 100 %   11/30/20 1108 -- 71 24 133/67 --   11/30/20 1107 -- 71 25 -- 96 %   11/30/20 1102 98.4 °F (36.9 °C) 70 20 (!) 146/64 100 %   11/30/20 1058 98.4 °F (36.9 °C) 67 20 (!) 146/64 98 %      Date 11/30/20 0700 - 12/01/20 0659 12/01/20 0700 - 12/02/20 0659   Shift 1197-2182 4854-2187 24 Hour Total 0646-0029 2419-6082 24 Hour Total   INTAKE   P.O. 60 60 120 90  90     P. O. 60 60 120 90  90   I. V.(mL/kg/hr) 600(0.4)  600(0.2)        Volume (lactated Ringers infusion) 600  600      Shift Total(mL/kg) 660(4.7) 60(0.4) 720(5.1) 90(0.6)  90(0.6)   OUTPUT   Urine(mL/kg/hr) 1000(0.6) 2050(1.2) 3050(0.9) 900  900     Urine Voided 900 2050 2950 900  900     Urine Output 100  100      Shift Total(mL/kg) 1000(7.1) 9817(69.7) 3050(21.8) 900(6.4)  900(6.4)   NET -340 -1990 -2330 -810  -810   Weight (kg) 140.2 140.2 140.2 140.2 140.2 140.2       EXAM: GENERAL: alert, pleasant, no distress   HEART: regular rate and rhythm   LUNGS: clear to auscultation   ABDOMEN:  Soft, obese, non tender, non distended, incisions clean, dry, no erythema or drainage   EXTREMITIES: warm, well perfused    Data Review    Recent Results (from the past 24 hour(s))   HCG URINE, QL    Collection Time: 11/30/20  7:57 AM   Result Value Ref Range    HCG urine, QL Negative NEG     DRUG SCREEN, URINE    Collection Time: 11/30/20  7:57 AM   Result Value Ref Range    BENZODIAZEPINES Negative NEG      BARBITURATES Negative NEG      THC (TH-CANNABINOL) Negative NEG      OPIATES Negative NEG      PCP(PHENCYCLIDINE) Negative NEG      COCAINE Negative NEG      AMPHETAMINES Negative NEG      METHADONE Negative NEG      HDSCOM (NOTE)    CBC W/O DIFF    Collection Time: 12/01/20  4:24 AM   Result Value Ref Range    WBC 14.4 (H) 4.6 - 13.2 K/uL    RBC 4.12 (L) 4.20 - 5.30 M/uL    HGB 11.4 (L) 12.0 - 16.0 g/dL    HCT 35.3 35.0 - 45.0 %    MCV 85.7 74.0 - 97.0 FL    MCH 27.7 24.0 - 34.0 PG    MCHC 32.3 31.0 - 37.0 g/dL    RDW 14.3 11.6 - 14.5 %    PLATELET 198 469 - 667 K/uL    MPV 9.2 9.2 - 09.6 FL   METABOLIC PANEL, BASIC    Collection Time: 12/01/20  4:24 AM   Result Value Ref Range    Sodium 138 136 - 145 mmol/L    Potassium 3.7 3.5 - 5.5 mmol/L    Chloride 107 100 - 111 mmol/L    CO2 26 21 - 32 mmol/L    Anion gap 5 3.0 - 18 mmol/L    Glucose 97 74 - 99 mg/dL    BUN 3 (L) 7.0 - 18 MG/DL    Creatinine 0.50 (L) 0.6 - 1.3 MG/DL    BUN/Creatinine ratio 6 (L) 12 - 20      GFR est AA >60 >60 ml/min/1.73m2    GFR est non-AA >60 >60 ml/min/1.73m2    Calcium 8.8 8.5 - 10.1 MG/DL   MAGNESIUM    Collection Time: 12/01/20  4:24 AM   Result Value Ref Range    Magnesium 2.0 1.6 - 2.6 mg/dL       Assessment:   Roberta Myles is a 39 y.o. female,  day 1 status post 1. Laparoscopic lysis of adhesions. 2.  Laparoscopic Khushboo-en-Y gastric bypass utilizing a 15 mL separate tubularized gastric pouch placed on the lesser curvature of the stomach, a 623-HQ retrocolic retrogastric Khushboo limb, and a 40-cm biliopancreatic limb. 3.  Laparoscopic left hepatic lobe wedge biopsy. 4.  Laparoscopic primary repair of a 3-cm umbilical fascial hernia defect.   Condition: good    Plan:   -Ambulate every four hours  - Pain managed prior to d/c   -Nausea managed prior to d/c   -Advance to Clear liquid Gastric Bypass Diet, if able to tolerate clear liquid diet (with pro shake) 4oz per hour for 3 hours prior to d/c    If patient continue to progress d/c home later today     Susan To NP  7:41 AM  12/1/2020

## 2020-12-08 ENCOUNTER — VIRTUAL VISIT (OUTPATIENT)
Dept: FAMILY MEDICINE CLINIC | Age: 45
End: 2020-12-08
Payer: MEDICAID

## 2020-12-08 DIAGNOSIS — Z98.84 S/P GASTRIC BYPASS: Primary | ICD-10-CM

## 2020-12-08 DIAGNOSIS — Z09 HOSPITAL DISCHARGE FOLLOW-UP: ICD-10-CM

## 2020-12-08 PROCEDURE — 99496 TRANSJ CARE MGMT HIGH F2F 7D: CPT | Performed by: NURSE PRACTITIONER

## 2020-12-08 RX ORDER — LANOLIN ALCOHOL/MO/W.PET/CERES
100 CREAM (GRAM) TOPICAL DAILY
COMMUNITY

## 2020-12-08 RX ORDER — CHOLECALCIFEROL TAB 125 MCG (5000 UNIT) 125 MCG
5000 TAB ORAL DAILY
COMMUNITY

## 2020-12-08 RX ORDER — LANOLIN ALCOHOL/MO/W.PET/CERES
1000 CREAM (GRAM) TOPICAL DAILY
COMMUNITY

## 2020-12-08 RX ORDER — IBUPROFEN 200 MG
500 CAPSULE ORAL 3 TIMES DAILY
COMMUNITY

## 2020-12-08 RX ORDER — MULTIVITAMIN
1 CAPSULE ORAL 2 TIMES DAILY
COMMUNITY

## 2020-12-08 NOTE — PROGRESS NOTES
Josi Fischer presents today for   Chief Complaint   Patient presents with   [facility] Follow Up     gastric bypass       Depression Screening:  3 most recent PHQ Screens 12/8/2020   Little interest or pleasure in doing things Not at all   Feeling down, depressed, irritable, or hopeless Not at all   Total Score PHQ 2 0       Learning Assessment:  Learning Assessment 9/1/2020   PRIMARY LEARNER Patient   HIGHEST LEVEL OF EDUCATION - PRIMARY LEARNER  DID NOT GRADUATE HIGH SCHOOL   BARRIERS PRIMARY LEARNER [address] CAREGIVER No   PRIMARY LANGUAGE ENGLISH   LEARNER PREFERENCE PRIMARY LISTENING   ANSWERED BY Emma Sarabia   RELATIONSHIP SELF       Abuse Screening:  Abuse Screening Questionnaire 9/1/2020   Do you ever feel afraid of your partner? N   Are you in a relationship with someone who physically or mentally threatens you? N   Is it safe for you to go home? Y       Fall Risk  Fall Risk Assessment, last 12 mths 12/8/2020   Able to walk? Yes   Fall in past 12 months? No       ADL  ADL Assessment 12/8/2020   Feeding yourself No Help Needed   Getting from bed to chair No Help Needed   Getting dressed No Help Needed   Bathing or showering No Help Needed   Walk across the room (includes cane/walker) No Help Needed   Using the telphone No Help Needed   Taking your medications No Help Needed   Preparing meals No Help Needed   Managing money (expenses/bills) No Help Needed   Moderately strenuous housework (laundry) No Help Needed   Shopping for personal items (toiletries/medicines) No Help Needed   Shopping for groceries No Help Needed   Driving No Help Needed   Climbing a flight of stairs No Help Needed   Getting to places beyond walking distances No Help Needed       Health Maintenance reviewed and discussed and ordered per Provider. Health Maintenance Due   Topic Date Due    DTaP/Tdap/Td series (1 - Tdap) 08/11/1998    PAP AKA CERVICAL CYTOLOGY  08/11/1998    Flu Vaccine (1) 09/01/2020   . Coordination of Care:  1. Have you been to the ER, urgent care clinic since your last visit? Hospitalized since your last visit? [facility]- gastric bypass 11/30/2020    2. Have you seen or consulted any other health care providers outside of the [address] [place] [address] since your last visit? Include any pap smears or colon screening.  No     Mammogram- 2020 per patient  [id number] per patient

## 2020-12-08 NOTE — PROGRESS NOTES
Nate Cam is a 39 y.o. female, evaluatedon 12/8/2020 for Hospital Follow Up (gastric bypass)  . Assessment & Plan:   Diagnoses and all orders for this visit:    1. S/P gastric bypass  [facility] discharge note reviewed   -Patient reported she is doing well, no pain, no constipation. Denies any shortness of breath or chest pains. 12  Subjective:   Patient is here for follow-up after hospital admission for Bariatric Surgery. Patient was admitted from 11/30/2020 to 12/1/2020. According to hospital discharge note operation was without significant complication. Patient admitted to the floor postoperatively, monitored as per protocol. She reports since discharge she has been doing well, no complaints. Denies any pain. No N/V/D or constipation. Tolerating diet well. No swelling of her extremities. No shortness of breath or chest pains. Prior to Admission medications    Medication Sig Start Date End Date Taking? Authorizing Provider   cholecalciferol (VITAMIN D3) (5000 Units/125 mcg) tab tablet Take 4,000 Units by mouth daily. Yes Provider, Historical   thiamine HCL (Vitamin B-1) 100 mg tablet Take 100 mg by mouth daily. Yes Provider, Historical   cyanocobalamin 1,000 mcg tablet Take 1,000 mcg by mouth daily. Yes Provider, Historical   calcium citrate 200 mg (950 mg) tablet Take 600 mg by mouth three (3) times daily. Yes Provider, Historical   multivitamin capsule Take 1 Cap by mouth daily. [place] vitamin   Yes Provider, Historical   enoxaparin (LOVENOX) 40 mg/0.4 mL 0.4 mL by SubCUTAneous route daily. Indications: deep vein thrombosis prevention, treatment to prevent a blood clot in the lung 12/2/20   Glen Bolivar NP   ondansetron (ZOFRAN ODT) 4 mg disintegrating tablet Take 1 Tab by mouth every eight (8) hours as needed for Nausea or Vomiting.  Indications: prevent nausea and vomiting after surgery 12/1/20   Glen Bolivar NP   cholecalciferol, vitamin D3, (Vitamin D3) 50 mcg (2,000 unit) tab Take 4,000 Units by mouth. Provider, Historical   ALPRAZolam (XANAX) 0.25 mg tablet Take 1 tablet twice a day by oral route as needed for 30 days. Provider, Historical   amLODIPine (NORVASC) 10 mg tablet Take 1 tablet every day by oral route. Provider, Historical   buPROPion SR (WELLBUTRIN SR) 100 mg SR tablet Take 1 tablet twice a day by oral route for 30 days. Provider, Historical   cloNIDine HCL (CATAPRES) 0.1 mg tablet Take 1 tablet every day by oral route at dinner. Provider, Historical         Review of Systems   Constitutional: Negative for chills, fever, malaise/fatigue and weight loss. HENT: Negative for sinus pain and sore throat. Eyes: Negative for blurred vision. Respiratory: Negative for cough, sputum production, shortness of breath and wheezing. Cardiovascular: Negative for chest pain, palpitations and leg swelling. Gastrointestinal: Negative for abdominal pain, constipation, diarrhea and heartburn. Genitourinary: Negative for dysuria and frequency. Musculoskeletal: Negative for falls and myalgias. Skin: Negative for rash. Neurological: Negative for dizziness, tingling, weakness and headaches. Endo/Heme/Allergies: Does not bruise/bleed easily. Psychiatric/Behavioral: Negative for depression. The patient is not nervous/anxious and does not have insomnia. Physical Exam  Vitals signs and nursing note reviewed. Constitutional:       Comments: Physical exam was deferred due to COVID- 19 precautions as visit was completed via telemedicine. No flowsheet data found. I was in the office while conducting this encounter. Consent:  She and/or her healthcare decision maker is aware that this patient-initiated Telehealth encounter is a billable service, with coverage as determined by her insurance carrier.  She is aware that she may receive a bill and has provided verbal consent to proceed: Yes    This virtual visit was conducted via [employer]. Pursuant to the emergency declaration under the [facility], [facility] waiver authority and the [employer] Act, this Virtual  Visit was conducted to reduce the patient's risk of exposure to COVID-19 and provide continuity of care for an established patient. Services were provided through a video synchronous discussion virtually to substitute for in-person clinic visit. Due to this being a TeleHealth evaluation, many elements of the physical examination are unable to be assessed.            Juan Roque NP

## 2020-12-08 NOTE — PROGRESS NOTES
Attended pre op class. Patient was educated on instructions below. Patient was provided a copy and all instructions were understood. Patient  was provided a copy and instructed to call with any questions. Patient was provided phone number to call. Patient verbalized understanding. Reviewed Lovenox  7-Day Preoperative Liquid Diet  Benefits:  ? Reducing intake before surgery will shrink  your liver by depleting glycogen (a form of  stored energy). ? Reduced liver size gives better access to  stomach during surgery, which translates  to a safer surgery. ? Prevents the ?last supper? syndrome. Attended pre op class. Patient was educated on instructions below. Patient was provided a copy and all instructions were understood. Patient  was provided a copy and instructed to call with any questions. Patient was provided phone number to call. Patient verbalized understanding. ? Experiencing weight loss before the  procedure encourages postoperative  compliance and ?jump-starts? weight loss. Specifics:  ? Start seven days before surgery:  ____________________.  ? NO SOLID FOODS!!   Your surgery will be CANCELED if this  diet is not followed!!!  ? Minimum of 64 ounces of fluid daily,  including protein drinks. ? No added sugar or carbonated beverages. ? Continue to take all your prescribed  medication and your vitamin supplements  during this preoperative diet phase. Clear liquids:  ? Water. ? Sugar free, non-carbonated beverages  (crystal light, propel). ? Sugar free popsicles. ? Sugar free Jell-O.  ? Fat free, reduced sodium broth . ? Decaffeinated coffee or decaffeinated tea  with artificial sweeteners. Protein:  ? 60 grams of protein daily  (in liquid supplements). ? Pre-made protein drinks. ? Protein powder added to water. ? 3 gram rule -- limit sugar and fat to less  than 3 grams per 8 ounces.   ? 4 to 6 ounces of low fat/low sugar yogurt  OR cottage cheese three to four times  during the week.  [facility] Gastric Bypass and Sleeve Dietary Progression  Date of Surgery: _ ______________________________________________________________  Ice chips start once admitted on floor. Clear liquid diet.  Begin bariatric clear liquid diet on: _ ______________________________________________   64 ounces of fluid per day. ? Low calorie, low sugar, non-carbonated beverages:  -- Water, Crystal Light, Propel Water, Sugar Free Jell-O, Sugar Free Popsicles, bouillon. -- Start protein supplement during this stage (60 to 70 grams per day). -- Getting your fluid in and staying hydrated is your number one priority! ? The clear liquid diet will last for seven days. Bariatric soft and moist diet.  Begin bariatric soft and moist diet on: _____________________________________________  ? This stage of the diet will last for five weeks, unless otherwise instructed by your surgeon. ? Begin -- one week after surgery. ? End -- six weeks after surgery (or when you follow up with the registered dietitian). ? Soft, moist, high protein foods -- three meals per day plus protein supplements. -- Portions should emphasize on soft protein. -- Portions will be a MAXIMUM of 1 ounce of solid food and 2 to 3 ounces of cottage cheese and yogurt. -- Protein supplements should be between meals and provide 30 to 40 grams per day during  soft protein diet. -- Continue to get 64 ounces of fluid in per day. ? Protein foods that are OK (SLOW TRANSITION) on the soft and moist diet:  -- First week on soft protein should focus on yogurt, cottage cheese, eggs, VEGETARIAN refried  beans, black beans, kidney beans and white beans. (NO BAKED BEANS.)  -- Second through fourth weeks should focus on yogurt, cottage cheese, eggs, canned tuna,  canned chicken, tilapia and fish (needs to be soft enough to be cut up with a fork).   -- Fifth week on soft protein diet should focus on yogurt, cottage cheese, eggs, canned tuna,  canned chicken, tilapia, fish, salmon, chicken breast or turkey. Remember to continue to get 64 ounces of fluid daily on ALL stages. To be advanced to bariatric maintenance stage of the bariatric diet, follow up with the dietitian  six weeks after surgery, around: Things I Need to Know Before Surgery  1. How many ounces of fluid will you need to drink every day after surgery? _________________  2. List three examples of bariatric clear liquids. __________________________________________________________________________  __________________________________________________________________________  __________________________________________________________________________  3. What is the 3 gram rule? ______________________________________________________  __________________________________________________________________________  __________________________________________________________________________  4. What is the 30 minute rule? ____________________________________________________  __________________________________________________________________________  __________________________________________________________________________  5. What are examples of food you will be able to eat on the bariatric soft and moist diet?  __________________________________________________________________________  __________________________________________________________________________  __________________________________________________________________________  6. After surgery I will be able to use a straw. ? TRUE ? FALSE  7. After surgery I will NOT be able to drink carbonated beverages. ? TRUE ? FALSE  -- 26 --  PATIENT GUIDE TO BARIATRIC [address] [place]  8. After surgery I will be able to drink caffeine. ? TRUE ? FALSE  9.  What four vitamins will you take after surgery?  _____________________________________ _ ___________________________________  _____________________________________ Sybil Bryant ___________________________________  10. How much exercise should you get daily? _________________________________________  __________________________________________________________________________  11. How long should it take you to eat a meal? _ _______________________________________  12. The calcium I have purchased is: ________________________________________________ . This has _________ mg per serving. I will need to take this _________ times a day. 13. The protein drink I have decided on is: ____________________________________________ . This has _________ grams of protein. I will need to drink _________ of my protein drinks  during the full liquid phase and _________ during the soft protein phase. My protein drinks  will give me _________ ounces of fluid. 14. After having a sleeve gastrectomy I will not be able to take NSAIDs  (non-steroidal anti-inflammatory drugs) for _________ weeks. 15. After having a gastric bypass I will not be able to take NSAIDs  (non-steroidal anti-inflammatory drugs) for _____________ weeks. ___________________________________________________    Medications  Please discuss all of your current medications with your surgeon at your preoperative appointment. Your surgeon will inform you regarding which medications to stop before surgery and which  medications you are to take the morning of surgery. Medications to Stop  To minimize the risk of blood loss during surgery,  you must avoid or stop taking medicines that  contain anti-inflammatories, blood thinners, arthritis  medications, and herbal supplements seven to 14 days  before your surgery. A nurse from pre-anesthesia  testing will review your list of medication. Your current  medications will be reviewed at your preoperative  appointment with your surgeon. Note: You may take prescribed narcotics, such as  Vicodin, Ultram and Neurontin.   Diabetic Medications  Adjustments in your diabetic medications will be discussed with your surgeon at your  preoperative appointment. Birth Control  In order to decrease your chance of getting a postoperative blood clot you will be required to stop  any oral contraceptive two weeks before your surgery date. During this time it is your responsibility  to use an effective form of birth control. You will be required to take a pregnancy test the morning  of surgery at the hospital and surgery will be canceled if you are pregnant. We strongly encourage  that you resume your birth control two weeks after surgery or after your first menstrual cycle  following surgery. -- 28 --  PATIENT GUIDE TO BARIATRIC [address] [place] [address] Rd  Non-Steroidal Anti-Inflammatory Drugs (NSAIDs)  Bypass:  One class of medications to avoid after Khushboo-en-Y gastric  bypass is NSAIDs. These can cause ulcers or stomach irritation  and are linked to a kind of ulcer called a marginal ulcer after  gastric bypass. Marginal ulcers can bleed or perforate. Usually  they are not fatal, but they can cause months or years of pain,  and are a common cause of re-operation and reversal of gastric  bypass. You will NEVER be able to take NSAIDs again. Your only choice for over the counter pain medication will be  Tylenol (acetaminophen). Steroid use can also be harmful to your stomach but may be necessary in some situations. Please consult with your bariatric surgeon and prescribing physician for approval.  Sleeve gastrectomy:  Following a sleeve gastrectomy you will not be allowed to take NSAIDs unless it has been  discussed and approved by your surgeon. Most commonly taken NSAIDs to be AVOIDED!! 1. Ibuprofen  2. Advil  3. Motrin  4. Excedrin  5. Aspirin  6. Celebrex  7. Naproxen  8. Aleve  9. Voltaren  10. Mobic  The following is a list of NSAIDS that are NEVER to be taken again after gastric bypass surgery:  Ibuprofen which is also known as:  Advil, Advil Childrens, Advil Kevin Strength, Advil Liquigel,  Advil Migraine, Advil Pediatric, Childrens Ibuprofen Berry, Genpril, IBU, Midol IB, Midol Maximum  Strength Cramp Formula, Dolgesic, Motrin Childrens, Motrin IB, Motrin Infant Drops, Motrin Kevin  Strength, Motrin Migraine Pain, Nuprin, Migraine Liqui-gels, Ibu-Tab 200, Cap-Profen, Tab-Profen,  Profen, Ibuprohm, Children?s Elixsure, IB Pro, Vicoprofen, Combunox, A-G Profen, Actiprofen,  Addaprin, Advil Infants Concentrated Drops, Caldolor, [place], Q-Profen, Ibifon 600, Ibren,  Dash, Midol Cramps & Bodyaches, Beadle, Jessica, [place] Valent[place], [place], [place], [facility]. -- 29 --  PATIENT GUIDE TO BARIATRIC [address] [place] [address]  Aspirin which has the brand name:  Arthritis Pain, Aspergum, Aspir-Low, Aspirin  Lite Coat, Estephania Aspirin, Bufferin, Easprin,  Ecotrin, Empirin, Fasprin, [place], Halfprin,  Strunk Aspirin, [place] Aspirin, Excedrin. Ketoprofen which is known as: Orudis KT,  Oruvail, Actron. Sulindac which is sold as: Clinoril. Naproxen is one of the most common NSAIDs,  and is sold as: Aleve, Naprosyn, EC-Naprosyn,  Naprelan, Anaprox, All Day Pain Relief,  Aflaxen, All Day Relief, Anaprox-DS, Comfort  Pac With Naproxen, Aleve Caplet, Aleve Easy  Open Arthritis, Aleve Gelcap, Anaprox-DS,  EC-Naprosyn, Leader Naproxen Sodium, Midol  Extended Relief, Naprelan 375, Naprelan  500, Naprelan 750, Prevacid NapraPac 375,  Prevacid NapraPac, Naprapac, Vimovo. Etodolac is sold under the brand name:  Lodine XL, Lodine. Fenoprofen can be found on the market as:  Nalfon, Nalfon 200. Diclofenac sold as: Arthrotec, Cataflam,  Voltaren, Cambia, Voltaren-XR, Zipsor. Flurbiprofen sold as: Asaid. Ketorolac is sold under the brand name:  Sprix, Toradol, Toradol IM, Toradol IV/IM. Piroxicam is found on the market as: Feldene. Indomethacin known as: Indocin SR, Indocin,  Indo-Ryne, Indomethegan, Indocin IV. Mefenamic Acid sold as: Ponstel.   Meloxicam is sold under the brand name:  Mobic. Nabumetone which is sold as: Relafen. Oxaprozin which has the brand name: Daypro. Ketoprofen which has the brand name:  Actron, Orudis KT, Orudis, Oruvail. Famotidine and Ibuprofen can be found as:  Duexis. Meclofenamate sold as: Meclomen. Tolmetin which can be found on the marked  as: Tolectin, Tolectin 600, Tolectin DS. Salsalate which is sold as: [facility]. Celecoxib which is sold as: Celebrex. -- 30 --  [address] [facility]  Smoking  It is required that ALL patients stop smoking  (including e-cigarettes) and chewing tobacco  three months before surgery. Prior to surgery  your surgeon will order a test to verify that  you have quit. Your surgery will be canceled  if you are smoking. Smoking or chewing tobacco leads to  decreased blood supply to your body?s tissues  and delays healing. Smoking harms every  organ in the body and has been linked to:  ? Blood clots (the leading cause of death after  bariatric surgery). ? Marginal ulcers after gastric bypass. ? Heart disease. ? Stroke. ? Chronic obstructive pulmonary  (lung) disease. ? Increased risk for hip fracture. ? Cataracts. ? Cancer of the mouth, throat, esophagus,  larynx (voice box), stomach, pancreas,  bladder, cervix, and kidney. Pregnancy  It is in your best interest to avoid pregnancy for  12 to 18 months after surgery. Pregnancy during  the rapid weight loss phase can be dangerous  and harmful to both mother and fetus. Do you have an effective method of birth  control? Please consult with your OB/GYN or  PCP for consultation. Alcohol  Alcohol is not recommended after bariatric  surgery. Alcohol contains calories but minimal  nutrition and will work against your weight  loss goal. For example, wine contains twice  the calories per ounce that regular soda does.   The absorption of alcohol changes with gastric  bypass and gastric sleeve because an enzyme  in the stomach which usually begins to digest  alcohol is absent or greatly reduced making  alcohol more potent. Alcohol may also be absorbed more quickly  into the body after gastric bypass or gastric  sleeve. The absorbed alcohol will be more  potent, and studies have demonstrated  that obesity surgery patients reach a higher  alcohol level and maintain the higher levels for  a longer period than others. In some patients  alcohol use can increase and lead to alcohol  dependence or addiction. For all of these  reasons, it is recommended to avoid alcohol  after bariatric surgery. Things to do before surgery:  ? Start the preoperative liquid diet on: _____________________________________________  ? Stop all NSAIDS (see page 30) and aspirin seven days before my surgery: _________________ .  Snow Bertrand my doctor(PCP or surgeon) regarding stopping Coumadin, Plavix or  other blood thinners. ? Purchase bariatric clear liquids (Crystal Lite, sugar free Jell-O, broth, sugar free popsicles,  protein supplement) and bariatric soft and moist foods (low fat yogurt, cottage cheese, eggs,  tuna, fish, chicken) so that I?m prepared when I get home from the hospital.  ? Purchase all vitamins that will be required following surgery. o Chewable multivitamin -- two per day (ex: Flintstones Complete). o Calcium Citrate -- 1,500 milligrams (500 milligrams, three times a day). o Vitamin B-12 -- 1,000 micrograms daily. o Vitamin D3 -- 5,000 IU daily. ? Create a system to keep track of how many ounces of fluid I am drinking daily  o Postoperative GOAL = minimum of 64 ounces per day. ? Purchase a protein supplement that I like.  o Brand: _ _________________________________________________________________ .  o Ounces: _________________________________________________________________ .  Elie Dunn of protein per serving: _________________________________________________ .   Before [address]  ? [place] your teeth -- upon awakening, you may brush your teeth and rinse with water, but do not  swallow the water. ? Take medication -- take only the medications as instructed by your provider with a small sip of  water as soon as you get up. ? Wear proper clothing that is loose-fitting and easily removed. ? Avoid back zippers and pantyhose. ? Please remove ALL jewelry (leave ALL jewelry and valuables at home). ? Avoid using perfumes, deodorant, shaving creams or any scented lotions. ? Bring a case with your name on it to hold your eyeglasses, contact lenses, hearing aids  and dentures. If you do not see your providers clean their hands, please ask them to do so.  ? Family and friends who visit you should not touch the surgical wound or dressings. ? Family and friends should clean their hands with soap and water or an alcohol-based hand  rub before and after visiting you. If you do not see them clean their hands, ask them to clean  their hands. ? Make sure you understand how to care for your incision before you leave the hospital.  ? Always clean your hands before and after caring for your incision. ? Make sure you know who to contact if you have questions or problems after you get home. ? If you have any symptoms of an infection, such as redness and pain at the surgery site, drainage,  or fever, call your doctor immediately. ? Ask your nursing staff to help you out of bed to walk within five hours of arriving at your  hospital room. Getting out of bed to walk helps to decrease complications, such as blood clots  and pneumonia. Length of Stay  You are many types of pain control methods that are available to control discomfort. Effective  pain control will allow you to be up and walking shortly after surgery. Your doctor will choose  the method that is right for you. Regardless of the method of pain medication being used, it is  important for you to communicate with your nurse if the pain medication is not enough.  Call your  nurse for pain medication when the pain is moderate instead of waiting for when pain is severe. What type of pain should I expect? ? Abdominal pain  ? Rib pain  ? Shoulder pain  ? [place] feeling in the center of your chest  Nausea:  Nausea following bariatric surgery is very  common. Causes include:  ? Anesthesia  ? Drinking too fastYou will be allowed 1 to 2 ounces of ice chips per hour when you are admitted to the floor. They are solely for your comfort. They are not required. ? You will be expected to walk the night of your surgery. Please ask your nurse or nursing assistant  for help the first time you walk. Please do not walk if you still feel groggy or unsteady on your feet. ? Your pain will be controlled with oral and IV pain medication on the day of surgery. ? In order to prevent pneumonia after surgery you please use your incentive spirometer (ICS)  at least 10 times per hour (see below). ? Pain medication  ? Surgery itself  I understand the serious potential complications include: deep venous thrombosis/pulmonary  embolism (blood clots in the legs and or lungs); gastrointestinal leak (leakage of digestive contents  into the abdomen); sepsis (serious infection); injury to adjacent organs such as esophagus, spleen,  pancreas, liver, diaphragm (requiring intervention or surgical removal); excessive bleeding; bowel  obstruction (blockage of the intestines); or organ failure. I am informed that these complications  can be life threatening. The overall mortality rate (risk of death) from bariatric surgery is close to  0.1 percent (1/1,000), but can be as high as 0.5 percent (1/200) for some patients. Patient initials: ______________  I understand that complications requiring re-operation may occur, either immediately after initial  surgery, or later in the recovery process.   Patient initials: ______________  Other potential complications which I may have include: wound infections or seromas  (fluid collection under skin); hernias (breakdown of tissue holding in abdominal contents);  gastritis or stomach ulcer (inflammation of the stomach); formation of gallstones; formation  of kidney stones or urinary tract infection; or pneumonia. Device related complications such as  foreign body reaction, band slippage, or erosion may occur with the adjustable gastric bandIwill  Pre-op instructions:  1. Shower with the antibacterial soap  the 3 days prior to surgery. 2. Pre-admission testing will contact you 1 week before surgery  3. 3 [facility] will contact you the day before surgery to confirm your surgery time.  Email or call your surgery scheduler if you have not heard from them by 3pm  4. Nothing to eat or drink (NPO) after midnight the night before surgery.  Take any evening medications by 11pm  5. Wear comfortable clothing. 6. Do not bring any valuables. 7. Bring insurance card, prescription card, photo ID and credit card to the hospital.  **Discuss all home medications with your surgeon at you pre-op appointment. Your surgeon will inform you of what medications to stop before surgery and what medications to take the day of surgery. **STOP all NSAIDS (Ibuprofen, Aleve, Advil, Naprosyn etc.) and Aspirin 7 days before your surgery      Important Information:  1. No lifting over 15 lbs. for 6 weeks post-op  2. No driving for 5-02 days after surgery - must be off pain medication. 3. No smoking EVER again! 4. You will NOT be able to take any Non-Steroidal Anti-inflammatories (NSAIDS), Aspirin or Steroids  a. Bypass/revision patients - EVER again. (Tylenol only)  b. Sleeve patients - 6 weeks after surgery  5. Pulse/temperature- twice daily for 2 weeks post-op   6. Avoid pregnancy for 18 months  7. Key Diet principles:  a. Vitamin/mineral supplements-[place] Complete, Calcium citrate, Vitamin D3, Vitamin B12  b. Protein supplements - 60-70 grams/day  c. Minimum 64 oz. fluid per day  d.        What to Expect in the Hospital:  Pre-op area:  o Emergency door take elevator 2nd floor  o Arrive 1.5 hours before surgery  o Lovenox (blood thinner)  o Incentive Spirometer  o SCDs  o Sign consent  o Anesthesia  Start IV    Operating Room:    o Gastric bypass: 2- 2 ½ hours  o Sleeve gastrectomy: 1-1 ½ hours  o Revision: 2-3 hours    PACU(Post Anesthesia Care Unit):  o Nasal cannula  o EKG monitor  o Blood pressure cuff  o Pulse Ox  o Shah Catheter  o SCDs  o No family allowed  o Minimum one hour  There are many types of pain control methods that are available to control discomfort. Effective  pain control will allow you to be up and walking shortly after surgery. Your doctor will choose  the method that is right for you. Regardless of the method of pain medication being used, it is  important for you to communicate with your nurse if the pain medication is not enough. Call your  nurse for pain medication when the pain is moderate instead of waiting for when pain is severe. What type of pain should I expect? ? Abdominal pain  ? Rib pain  ? Shoulder pain  ? [place] feeling in the center of your chest  Nausea:  Nausea following bariatric surgery is very  common. Causes include:  ? Anesthesia  ? Drinking too fast  ? Pain medication  ? Surgery itself    Expectations on your Day of Surgery  ? You will be allowed 1 to 2 ounces of ice chips per hour when you are admitted to the floor. They are solely for your comfort. They are not required. ? You will be expected to walk the night of your surgery. Please ask your nurse or nursing assistant  for help the first time you walk. Please do not walk if you still feel groggy or unsteady on your feet. ? Your pain will be controlled with oral and IV pain medication on the day of surgery.   ? In order to prevent pneumonia after surgery you please use your incentive spirometer (ICS)  at least 10 times per hour (see below    2 Central (Day of Surgery):  o Private room  o 1-2 oz. ice chips per hour   o IV Dilaudid  or liquid pain medication as needed for pain  o Discontinue ahumada catheter  o Walk within 4 hours  o One family member can spend the night (must 18 years or older)  o Cell phone/computers - OK    2 Central (Post-op Day 1/Post-op Day 2):  o 7am - Gastrograffin swallow study (X-ray) for:  o All sleeve gastrectomy patients  o All revision patients   o Discontinue -nasal cannula and heart rate monitor  o Start bariatric clear liquid diet - water, crystal light, broth, Jell-O and protein supplement  o Slowly increase to 3 oz. clear liquids and 1 oz. protein supplement per hour  o Oral pain medication as needed for pain - communicate with your nurse  o Goal:  48 to 64 ounces, clear liquid per day preferable water  o Discharge instructions/Lovenox self-injection instructions   o WALK & WATER    Post-op Goals:  1. Void within 8 hours of your catheter being removed  2. Pain is well controlled with oral pain medication  3. Nausea is under control/No vomiting  4. Tolerating 3 oz. of clear liquids and 1 oz. protein supplement per hour for 3 consecutive hours. Post-op Follow-up Labs will need to be completed 1-2 weeks BEFORE your 3 month, 6 month and yearly visits. These labs are required and your appointment will be rescheduled if they are not completed. My surgical procedure and post-operative hospital stay has been discussed with me and I have been given the opportunity to ask any questions I may have.     Patient Signature: ____________________________  Date: _____________________

## 2020-12-15 ENCOUNTER — OFFICE VISIT (OUTPATIENT)
Dept: SURGERY | Age: 45
End: 2020-12-15
Payer: COMMERCIAL

## 2020-12-15 VITALS
RESPIRATION RATE: 18 BRPM | OXYGEN SATURATION: 99 % | BODY MASS INDEX: 56.35 KG/M2 | WEIGHT: 287 LBS | SYSTOLIC BLOOD PRESSURE: 128 MMHG | HEART RATE: 80 BPM | DIASTOLIC BLOOD PRESSURE: 86 MMHG | TEMPERATURE: 98.1 F | HEIGHT: 60 IN

## 2020-12-15 DIAGNOSIS — K91.2 POSTOPERATIVE INTESTINAL MALABSORPTION: Primary | ICD-10-CM

## 2020-12-15 PROCEDURE — 99024 POSTOP FOLLOW-UP VISIT: CPT | Performed by: SURGERY

## 2020-12-15 NOTE — PROGRESS NOTES
Chief Complaint   Patient presents with    Post OP Follow Up     Laparoscopic lysis of adhesions gastric bypass 11/20/2020   1. Have you been to the ER, urgent care clinic since your last visit? Hospitalized since your last visit? No    2. Have you seen or consulted any other health care providers outside of the [address] [place] [address] since your last visit? Include any pap smears or colon screening. No    Pt ID confirmed    Weight Loss Metrics 12/15/2020 12/15/2020 11/30/2020 11/24/2020 11/17/2020 11/17/2020 9/1/2020   Pre op / Initial Wt 309 - - - 309 - -   Today's Wt - 287 lb - 309 lb - 309 lb 303 lb 3.2 oz   BMI - 56.05 kg/m2 60.35 kg/m2 - - 60.35 kg/m2 59.21 kg/m2   Ideal Body Wt 120 - - - 120 - -   Excess Body Wt 189 - - - 189 - -   Goal Wt 158 - - - 158 - -   Wt loss to date 22 - - - 0 - -   % Wt Loss 0.15 - - - 0 - -   80% .2 - - - 151.2 - -       Body mass index is 56.05 kg/m².     Post op medications:  MVI: 2xday  Calcium Citrate: 600 milligram 3x day  Actigal 0  B-12 1000 micrograms   Vit D 3 5000 units daily thiamine 100 milligram

## 2020-12-15 NOTE — PROGRESS NOTES
Bariatric Follow-Up Evaluation    Isabel Butt is a 39 y.o. black female who presents in follow-up status post laparoscopic gastric bypass, primary incarcerated hernia repair, lysis of adhesions, left hepatic lobe wedge biopsy November 30, 2020 noting expected decreasing incisional discomfort no longer requiring analgesics and otherwise without complaint. The patient is tolerating an early post gastric bypass diet with appropriately satiety no specific food intolerances or pathologic emesis. Compliant with multivitamin, calcium citrate, vitamin B12, vitamin B1, and vitamin D supplementation  Exercise regimen: Walking at least 30 minutes on a daily basis  Morbidities: Hypertension, gastroesophageal reflux disease, clinical obstructive sleep apnea-resolved                     Weight related arthropathy-improved  Preoperative weight: 309  Current weight: 287. Body mass index 56. Estimated postsurgical weight loss: 151  Current weight loss: 22  Goal weight: 158  Percentage weight loss: 15% / 15 days      Weight Loss Metrics 12/15/2020 12/15/2020 11/30/2020 11/24/2020 11/17/2020 11/17/2020 9/1/2020   Pre op / Initial Wt 309 - - - 309 - -   Today's Wt - 287 lb - 309 lb - 309 lb 303 lb 3.2 oz   BMI - 56.05 kg/m2 60.35 kg/m2 - - 60.35 kg/m2 59.21 kg/m2   Ideal Body Wt 120 - - - 120 - -   Excess Body Wt 189 - - - 189 - -   Goal Wt 158 - - - 158 - -   Wt loss to date 22 - - - 0 - -   % Wt Loss 0.15 - - - 0 - -   80% .2 - - - 151.2 - -       Allergies   Allergen Reactions    Lisinopril Other (comments)     Swollen lips       Current Outpatient Medications on File Prior to Visit   Medication Sig Dispense Refill    cholecalciferol (VITAMIN D3) (5000 Units/125 mcg) tab tablet Take 4,000 Units by mouth daily.  thiamine HCL (Vitamin B-1) 100 mg tablet Take 100 mg by mouth daily.  cyanocobalamin 1,000 mcg tablet Take 1,000 mcg by mouth daily.       calcium citrate 200 mg (950 mg) tablet Take 600 mg by mouth three (3) times daily.  multivitamin capsule Take 1 Cap by mouth two (2) times a day. flinestones vitamin        No current facility-administered medications on file prior to visit. Past Medical History:   Diagnosis Date    Anxiety and depression     Back pain     Constipation     Diverticulitis     Fibroids     GERD (gastroesophageal reflux disease)     HTN (hypertension)     no meds    Knee pain     Morbid obesity (HCC)     OA (osteoarthritis)     of knees       Past Surgical History:   Procedure Laterality Date    BREAST SURGERY PROCEDURE UNLISTED      reduction    HX BREAST REDUCTION      HX COLONOSCOPY      HX GASTRIC BYPASS      HX GYN      btl and partial hysterectomy       Social History     Tobacco Use    Smoking status: Never Smoker    Smokeless tobacco: Never Used   Substance Use Topics    Alcohol use: Not Currently     Frequency: 2-4 times a month     Drinks per session: 3 or 4    Drug use: Not Currently       Family History   Problem Relation Age of Onset    Hypertension Mother     Hypertension Father        ROS:  General: Negative for fevers, chills, night sweats, fatigue, weight loss, or weight gain. GI: Negative for abdominal pain, change in bowel habits, hematochezia, melena, or GERD. Integumentary: Negative for dermatitis or abnormal moles. HEENT: Negative for visual changes, vertigo, epistaxis, dysphagia, or hoarseness    Cardiac: Negative for chest pain, palpitations, hypertension, edema, or varicosities    Resp: Negative for cough, shortness of breath, wheezing, hemoptysis, snoring, or reactive airway disease    : Negative for hematuria, dysuria, frequency, urgency, nocturia, or stress urinary incontinence    MSK:  Negative for weakness, joint pain, or arthritis    Endocrine: Negative for diabetes, thyroid disease, polyuria, polydipsia, polyphagia, poor wound, heat intolerance, or cold intolerance.     Lymph/Heme: Negative for anemia, bruising, or history of blood transfusions. Neuro:  Negative for dizziness, headache, fainting, seizures, and stroke. Psychiatry:  Negative for anxiety or depression    Physical Exam    Visit Vitals  /86   Pulse 80   Temp 98.1 °F (36.7 °C)   Resp 18   Ht 5' (1.524 m)   Wt 130.2 kg (287 lb)   LMP 07/16/2020   SpO2 99%   BMI 56.05 kg/m²       Nursing note reviewed. General: 39 y.o. female is in no acute distress. Resp: Clear to auscultation bilaterally, no wheezing, rhonchi, or rales, excursions normal and symmetrical.  Cardio: Regular rate and rhythm, no murmurs, clicks, gallops, or rubs. No edema or varicosities. Abdomen: Obese, soft, nontender, nondistended, normoactive bowel sounds, no hernias, no hepatosplenomegaly, wounds clean dry approximated with appropriate surrounding induration incisional tenderness without evidence of infectious complications or incisional hernia  Psych: Alert and oriented to person, place, and time.     November 30, 2020 liver biopsy/pathology: Mild steatosis comprising 30% of the hepatic parenchyma    Impression    Status post laparoscopic Khushboo-en-Y gastric bypass November 30, 2020 with appropriate weight loss and comorbidity resolution      Plan    Continue compliance with the early post gastric bypass diet with advancement utilizing assistance of bariatric nutrition  Continue compliance with bariatric surgical diet, exercise regimen, iron supplementation protocol, encourage monthly intensive bariatric support group meetings  Follow-up March 2020 with 3-month labs for ongoing bariatric surgical follow-up

## 2021-01-13 ENCOUNTER — DOCUMENTATION ONLY (OUTPATIENT)
Dept: BARIATRICS/WEIGHT MGMT | Age: 46
End: 2021-01-13

## 2021-01-13 ENCOUNTER — HOSPITAL ENCOUNTER (OUTPATIENT)
Dept: BARIATRICS/WEIGHT MGMT | Age: 46
Discharge: HOME | End: 2021-01-13

## 2021-01-13 NOTE — PROGRESS NOTES
[facility]  POST-OP NUTRITION FOLLOW UP    Patient's Name: Nakul Foreman  YOB: 1975  Surgery Date: 2020      Procedure: Gastric Bypass    Surgeon: Dr. Laura Whitman    Height: 5 f      Pre-Op Weight: 309     Current Weight: 276  Weight Lost: 33    BMI:      Attendance of support group:   When:   Why not:     Complications  Readmittance: None  Reoperations: None  Complications: None  IV Fluids: None  ER Trips: None    Problem Areas:   Nausea: None   Vomiting: None   Dumping Syndrome: None  Inadequate Protein: None, patient states she is getting at least one shake per day. Inadequate Fluids: Sometimes she only gets 40 ounces in. Food Intolerance: None  Hunger: None. Constipation: Yes. Patient is taking Miralax for that. Eating 3 Meals/Day:Yes  Portion Size at Meals: 1 ounce     Protein from Food:     Foods being consumed:  Breakfast: Time: 8:00 am:  1 scrambled egg  Lunch: Time: chicken, tuna,     Dinner:  Time: Air fried chicken breast and some broccoli. Patient tolerates broccoli okay     In-between eating: Sometimes eating veggie straws between meals. Length of time for meals: 20 minute    food/fluids: 30    Fluids: 42-64 oz/day   Types of Fluids:     MVI: Flinstones Complete    Number/Day: bid   Taken Separately: yes    Vitamin B1: Yes   Dosin mg per day    Calcium: Crushing     Calcium Dosin mg    Taken Separately: Yes    Vitamin B12: sublingual   Vitamin B12 Dosin mcg    Vitamin D: Yes     Vitamin D dosin IU    Iron: n/a    Iron dosing:      Protein Supplement: Premier     Grams of Protein: 30   Mixed with:      Splitting Protein Drink in 1/2:    Timing of Protein Drinks:   Patient is taking 7 days a week. Exercise: Walking 2 x a day for 1 mile      Comments:    Patient is doing well at 6 weeks post op. Her portions are appropriate.   She does need to work on increasing fluid and I made suggestions on how this can be achieved. She is also eating veggie straws between meals, which I have talked to her about the habit this gets into. Although they are better in chips, they still have 15 grams of carbohydrates per serving and promotes snacking. She is taking vitamins as instructed. Will monitor her progress. Patient was educated on the importance of eating meat and vegetables only. I have talked with patient about the effects of carbohydrates, not only from a weight management perspective, but also what effects it could have on their blood sugar and what reactive hypoglycemia is.         Diet Follow Up:  9 month class scheduled for:     Boogie Shepherd [address]    1/13/2021

## 2021-01-15 DIAGNOSIS — K91.2 POSTOPERATIVE INTESTINAL MALABSORPTION: ICD-10-CM

## 2021-02-26 ENCOUNTER — APPOINTMENT (OUTPATIENT)
Dept: GENERAL RADIOLOGY | Age: 46
End: 2021-02-26
Attending: INTERNAL MEDICINE
Payer: MEDICAID

## 2021-02-26 ENCOUNTER — HOSPITAL ENCOUNTER (EMERGENCY)
Age: 46
Discharge: HOME | End: 2021-02-26
Attending: INTERNAL MEDICINE
Payer: MEDICAID

## 2021-02-26 VITALS
WEIGHT: 257 LBS | HEART RATE: 74 BPM | BODY MASS INDEX: 50.45 KG/M2 | HEIGHT: 60 IN | DIASTOLIC BLOOD PRESSURE: 74 MMHG | RESPIRATION RATE: 17 BRPM | SYSTOLIC BLOOD PRESSURE: 132 MMHG | OXYGEN SATURATION: 100 % | TEMPERATURE: 98 F

## 2021-02-26 DIAGNOSIS — M54.16 ACUTE LEFT LUMBAR RADICULOPATHY: Primary | ICD-10-CM

## 2021-02-26 PROCEDURE — 74011250636 HC RX REV CODE- 250/636: Performed by: INTERNAL MEDICINE

## 2021-02-26 PROCEDURE — 72100 X-RAY EXAM L-S SPINE 2/3 VWS: CPT

## 2021-02-26 PROCEDURE — 96372 THER/PROPH/DIAG INJ SC/IM: CPT

## 2021-02-26 PROCEDURE — 74011250637 HC RX REV CODE- 250/637: Performed by: INTERNAL MEDICINE

## 2021-02-26 PROCEDURE — 99283 EMERGENCY DEPT VISIT LOW MDM: CPT

## 2021-02-26 RX ORDER — OXYCODONE AND ACETAMINOPHEN 5; 325 MG/1; MG/1
2 TABLET ORAL
Status: COMPLETED | OUTPATIENT
Start: 2021-02-26 | End: 2021-02-26

## 2021-02-26 RX ORDER — DEXAMETHASONE SODIUM PHOSPHATE 4 MG/ML
10 INJECTION, SOLUTION INTRA-ARTICULAR; INTRALESIONAL; INTRAMUSCULAR; INTRAVENOUS; SOFT TISSUE
Status: COMPLETED | OUTPATIENT
Start: 2021-02-26 | End: 2021-02-26

## 2021-02-26 RX ORDER — HYDROCODONE BITARTRATE AND ACETAMINOPHEN 5; 325 MG/1; MG/1
1 TABLET ORAL
Qty: 12 TAB | Refills: 0 | Status: SHIPPED | OUTPATIENT
Start: 2021-02-26 | End: 2021-02-26 | Stop reason: SDUPTHER

## 2021-02-26 RX ORDER — HYDROCODONE BITARTRATE AND ACETAMINOPHEN 5; 325 MG/1; MG/1
1 TABLET ORAL
Qty: 12 TAB | Refills: 0 | Status: SHIPPED | OUTPATIENT
Start: 2021-02-26 | End: 2021-03-01

## 2021-02-26 RX ORDER — LIDOCAINE 4 G/100G
PATCH TOPICAL
Qty: 10 PATCH | Refills: 0 | Status: SHIPPED | OUTPATIENT
Start: 2021-02-26 | End: 2021-08-30

## 2021-02-26 RX ORDER — LIDOCAINE 4 G/100G
PATCH TOPICAL
Qty: 10 PATCH | Refills: 0 | Status: SHIPPED | OUTPATIENT
Start: 2021-02-26 | End: 2021-02-26 | Stop reason: SDUPTHER

## 2021-02-26 RX ADMIN — DEXAMETHASONE SODIUM PHOSPHATE 10 MG: 4 INJECTION, SOLUTION INTRAMUSCULAR; INTRAVENOUS at 02:43

## 2021-02-26 RX ADMIN — OXYCODONE HYDROCHLORIDE AND ACETAMINOPHEN 2 TABLET: 5; 325 TABLET ORAL at 02:44

## 2021-02-26 NOTE — LETTER
[facility] EMERGENCY DEPT 
[facility] [address] 
[phone] Work/School Note Date: 2/26/2021 To Whom It May concern: 
 
Anastasia Batista was seen and treated today in the emergency room by the following provider(s): 
Attending Provider: Demetrio Amse MD.   
 
Anastasia Batista may return to work on 02/28/2021. Sincerely, Monica Quezada

## 2021-02-26 NOTE — ED NOTES
I have reviewed discharge instructions with the patient. The patient verbalized understanding. Patient provided work note.  Prescriptions sent to [facility] in [place] per request.

## 2021-02-26 NOTE — LETTER
[facility] EMERGENCY DEPT 
[facility] [address] 
[phone] Work/School Note Date: 2/26/2021 To Whom It May concern: 
 
Fortunato Hickey was seen and treated today in the emergency room by the following provider(s): 
Attending Provider: Kelly Madden MD.   
 
Fortunato Hickey is excused from work/school on 02/26/21 and 02/27/21. She is medically clear to return to work/school on 2/28/2021. Sincerely, Leslie Vital MD

## 2021-02-26 NOTE — ED TRIAGE NOTES
Patient states she recently started working at the [employer] of a [email or web address]. She hasn't had any issues with the increased movement until yesterday when she started having pain in her left side that shoots down her left leg. Patient reports pain is worse when she is up and moving.

## 2021-02-27 ENCOUNTER — HOSPITAL ENCOUNTER (OUTPATIENT)
Dept: LAB | Age: 46
Discharge: HOME | End: 2021-02-27

## 2021-02-27 NOTE — ED PROVIDER NOTES
EMERGENCY DEPARTMENT HISTORY AND PHYSICAL EXAM      Date: 2/26/2021  Patient Name: Jodi Light    History of Presenting Illness     Chief Complaint   Patient presents with    Rib Pain       History Provided By: Patient    HPI: Jodi Light, 39 y.o. female with a past medical history significant Anxiety, depression, gastric bypass presents to the ED with cc of left rib pain. Patient states she recently started working at the [employer] of a [employer]. She hasn't had any issues with the increased movement until yesterday when she started having pain in her left side that shoots down her left leg. Patient reports pain is worse when she is up and moving. There are no other complaints, changes, or physical findings at this time. PCP: King Erlin NP    No current facility-administered medications on file prior to encounter. Current Outpatient Medications on File Prior to Encounter   Medication Sig Dispense Refill    cholecalciferol (VITAMIN D3) (5000 Units/125 mcg) tab tablet Take 4,000 Units by mouth daily.  thiamine HCL (Vitamin B-1) 100 mg tablet Take 100 mg by mouth daily.  cyanocobalamin 1,000 mcg tablet Take 1,000 mcg by mouth daily.  calcium citrate 200 mg (950 mg) tablet Take 600 mg by mouth three (3) times daily.  multivitamin capsule Take 1 Cap by mouth two (2) times a day.  flinestones vitamin          Past History     Past Medical History:  Past Medical History:   Diagnosis Date    Anxiety and depression     Back pain     Constipation     Diverticulitis     Fibroids     GERD (gastroesophageal reflux disease)     HTN (hypertension)     no meds    Knee pain     Morbid obesity (HCC)     OA (osteoarthritis)     of knees       Past Surgical History:  Past Surgical History:   Procedure Laterality Date    HX BREAST REDUCTION      HX COLONOSCOPY      HX GASTRIC BYPASS      HX GYN      btl and partial hysterectomy    ND BREAST SURGERY PROCEDURE UNLISTED reduction       Family History:  Family History   Problem Relation Age of Onset    Hypertension Mother     Hypertension Father        Social History:  Social History     Tobacco Use    Smoking status: Never Smoker    Smokeless tobacco: Never Used   Substance Use Topics    Alcohol use: Not Currently     Frequency: 2-4 times a month     Drinks per session: 3 or 4    Drug use: Not Currently       Allergies: Allergies   Allergen Reactions    Lisinopril Other (comments)     Swollen lips         Review of Systems     Review of Systems   Constitutional: Negative for activity change and appetite change. HENT: Negative for ear pain. Respiratory: Negative for chest tightness. Cardiovascular: Negative for chest pain and leg swelling. Gastrointestinal: Negative for abdominal distention. Genitourinary: Negative for dysuria and flank pain. Musculoskeletal: Positive for back pain. Negative for neck pain. Skin: Negative for rash. Neurological: Negative for headaches. Physical Exam     Physical Exam  Vitals signs and nursing note reviewed. Constitutional:       General: She is not in acute distress. Appearance: Normal appearance. She is normal weight. She is not toxic-appearing. HENT:      Head: Normocephalic and atraumatic. Nose: No congestion. Eyes:      Extraocular Movements: Extraocular movements intact. Pupils: Pupils are equal, round, and reactive to light. Neck:      Musculoskeletal: No neck rigidity or muscular tenderness. Cardiovascular:      Rate and Rhythm: Normal rate and regular rhythm. Heart sounds: No murmur. No gallop. Pulmonary:      Effort: No respiratory distress. Breath sounds: No wheezing. Abdominal:      General: Abdomen is flat. Bowel sounds are normal.      Palpations: Abdomen is soft. Tenderness: There is no abdominal tenderness. Musculoskeletal: Normal range of motion. General: No swelling or tenderness.       Comments: Positive straight leg raise on the left to 30 degrees   Skin:     General: Skin is warm and dry. Capillary Refill: Capillary refill takes less than 2 seconds. Neurological:      General: No focal deficit present. Mental Status: She is alert and oriented to person, place, and time. Mental status is at baseline. Psychiatric:         Mood and Affect: Mood normal.         Behavior: Behavior normal.         Lab and Diagnostic Study Results     Labs -   No results found for this or any previous visit (from the past 12 hour(s)). Radiologic Studies -   @lastxrresult@  CT Results  (Last 48 hours)    None        CXR Results  (Last 48 hours)    None            Medical Decision Making   - I am the first provider for this patient. - I reviewed the vital signs, available nursing notes, past medical history, past surgical history, family history and social history. - Initial assessment performed. The patients presenting problems have been discussed, and they are in agreement with the care plan formulated and outlined with them. I have encouraged them to ask questions as they arise throughout their visit. Vital Signs-Reviewed the patient's vital signs. No data found. 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 I feel that she has likely a lumbar radiculopathy with degenerative disks changes. She wears low impact shoes, is recommended that she spends less time 100 feet and she changes the insoles of her shoes so she can be provided with more cushion for her feet. She was evaluated in the emergency department, and discharged home with lidocaine patches and Norco # 12. She was advised to follow-up with Linda Goldberg her nurse practitioner for further evaluation. Risk of Complications, Morbidity, and/or Mortality  Presenting problems: moderate  Diagnostic procedures: moderate  Management options: moderate           Procedures   Medical Decision Makingedical Decision Making  Performed by: Israel Haas MD  PROCEDURES:  Procedures       Disposition   Disposition: DC- Adult Discharges: All of the diagnostic tests were reviewed and questions answered. Diagnosis, care plan and treatment options were discussed. The patient understands the instructions and will follow up as directed. The patients results have been reviewed with them. They have been counseled regarding their diagnosis. The patient verbally convey understanding and agreement of the signs, symptoms, diagnosis, treatment and prognosis and additionally agrees to follow up as recommended with their PCP in 24 - 48 hours. They also agree with the care-plan and convey that all of their questions have been answered. I have also put together some discharge instructions for them that include: 1) educational information regarding their diagnosis, 2) how to care for their diagnosis at home, as well a 3) list of reasons why they would want to return to the ED prior to their follow-up appointment, should their condition change. Discharged    DISCHARGE PLAN:  1. Cannot display discharge medications since this patient is not currently admitted.     2.   Follow-up Information     Follow up With Specialties Details Why Contact Vishal Wellington NP Nurse Practitioner In 2 days for reevaluation of today's complaint [address]  [phone]      [facility] EMERGENCY DEPT Emergency Medicine  As needed, If symptoms worsen [facility] [address] Reyna Alvarado MD Orthopedic Surgery In 2 days for reevaluation of today's complaint [address]  [phone]          3. Return to ED if worse   4. Discharge Medication List as of 2/26/2021  4:12 AM      START taking these medications    Details   lidocaine (Aspercreme, lidocaine,) 4 % patch Apply to sore, tender area on your back or thigh as needed, remove after 8 hours, Normal, Disp-10 Patch, R-0      HYDROcodone-acetaminophen (Norco) 5-325 mg per tablet Take 1 Tab by mouth every six (6) hours as needed for Pain for up to 3 days. Max Daily Amount: 4 Tabs., Normal, Disp-12 Tab, R-0         CONTINUE these medications which have NOT CHANGED    Details   cholecalciferol (VITAMIN D3) (5000 Units/125 mcg) tab tablet Take 4,000 Units by mouth daily. , Historical Med      thiamine HCL (Vitamin B-1) 100 mg tablet Take 100 mg by mouth daily. , Historical Med      cyanocobalamin 1,000 mcg tablet Take 1,000 mcg by mouth daily. , Historical Med      calcium citrate 200 mg (950 mg) tablet Take 600 mg by mouth three (3) times daily. , Historical Med      multivitamin capsule Take 1 Cap by mouth two (2) times a day. flbenjamines vitamin , Historical Med               Diagnosis     Clinical Impression:   1. Acute left lumbar radiculopathy        Attestations:    Sandor Kowalski MD    Please note that this dictation was completed with plista, the computer voice recognition software. Quite often unanticipated grammatical, syntax, homophones, and other interpretive errors are inadvertently transcribed by the computer software. Please disregard these errors. Please excuse any errors that have escaped final proofreading. Thank you.

## 2021-03-01 ENCOUNTER — VIRTUAL VISIT (OUTPATIENT)
Dept: FAMILY MEDICINE CLINIC | Age: 46
End: 2021-03-01
Payer: MEDICAID

## 2021-03-01 DIAGNOSIS — M54.42 ACUTE LEFT-SIDED LOW BACK PAIN WITH LEFT-SIDED SCIATICA: Primary | ICD-10-CM

## 2021-03-01 DIAGNOSIS — Z98.84 BARIATRIC SURGERY STATUS: ICD-10-CM

## 2021-03-01 LAB
ALBUMIN SERPL-MCNC: 3.8 G/DL (ref 3.8–4.8)
ERYTHROCYTE [DISTWIDTH] IN BLOOD BY AUTOMATED COUNT: 15.7 % (ref 11.7–15.4)
HCT VFR BLD AUTO: 35.5 % (ref 34–46.6)
HGB BLD-MCNC: 12.1 G/DL (ref 11.1–15.9)
IRON SERPL-MCNC: 38 UG/DL (ref 27–159)
MCH RBC QN AUTO: 28.7 PG (ref 26.6–33)
MCHC RBC AUTO-ENTMCNC: 34.1 G/DL (ref 31.5–35.7)
MCV RBC AUTO: 84 FL (ref 79–97)
PLATELET # BLD AUTO: 373 X10E3/UL (ref 150–450)
RBC # BLD AUTO: 4.21 X10E6/UL (ref 3.77–5.28)
SPECIMEN STATUS REPORT, ROLRST: NORMAL
VIT B1 BLD-SCNC: NORMAL NMOL/L
WBC # BLD AUTO: 10.5 X10E3/UL (ref 3.4–10.8)

## 2021-03-01 PROCEDURE — 99442 PR PHYS/QHP TELEPHONE EVALUATION 11-20 MIN: CPT | Performed by: FAMILY MEDICINE

## 2021-03-01 RX ORDER — PREDNISONE 20 MG/1
TABLET ORAL
Qty: 12 TAB | Refills: 0 | Status: SHIPPED | OUTPATIENT
Start: 2021-03-01 | End: 2021-03-14

## 2021-03-01 NOTE — PROGRESS NOTES
Patient wants to connect through [employer] for her appt, the number to send the link to is [phone]

## 2021-03-01 NOTE — PROGRESS NOTES
Nakul Foreman is a 39 y.o. 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 Try prednisone 10 mg 4 times a day. For 3 days then 20 mg for 3 days then 10 mg for 3 days she cannot take anti-inflammatories and she is aware of that I have asked her to use heat on the back I do not think she should be taking antispasmodics at this point as well she will let me know how she is doing later this week this was a 15-minute visit with telephone to telephone interaction the patient was at her home I was in my office recommended heat 4 times a day         712  Subjective: Patient is a 68-year-old female who is seen for evaluation today. No nausea vomiting or diarrhea. She was seen over the weekend in the emergency room for left-sided buttock and leg pain. They gave her codeine and told her to call if any other issues a set her x-ray was unremarkable she has had no vomiting or diarrhea. She has had no rash no syncope or loss of consciousness. She has been eating relatively well nobody at home has been sick no chest pain or shortness of breath. The pain got better over the weekend and then recurred a little this morning she is able to do her normal activities in a convenience store. She does not do any heavy lifting she has not a smoker no alcohol use. She has allergies only to lisinopril but she did have bariatric surgery in November she has lost 54 pounds at this point       Prior to Admission medications    Medication Sig Start Date End Date Taking?  Authorizing Provider HYDROcodone-acetaminophen (Norco) 5-325 mg per tablet Take 1 Tab by mouth every six (6) hours as needed for Pain for up to 3 days. Max Daily Amount: 4 Tabs. 2/26/21 3/1/21 Yes Abeby Galvez MD   lidocaine (Aspercreme, lidocaine,) 4 % patch Apply to sore, tender area on your back or thigh as needed, remove after 8 hours 2/26/21  Yes Abbey Galvez MD   cholecalciferol (VITAMIN D3) (5000 Units/125 mcg) tab tablet Take 4,000 Units by mouth daily. Yes Provider, Historical   thiamine HCL (Vitamin B-1) 100 mg tablet Take 100 mg by mouth daily. Yes Provider, Historical   cyanocobalamin 1,000 mcg tablet Take 1,000 mcg by mouth daily. Yes Provider, Historical   calcium citrate 200 mg (950 mg) tablet Take 600 mg by mouth three (3) times daily. Yes Provider, Historical   multivitamin capsule Take 1 Cap by mouth two (2) times a day. flinestones vitamin    Yes Provider, Historical         Review of Systems   Constitutional: Negative. HENT: Negative. Eyes: Negative. Respiratory: Negative. Cardiovascular: Negative. Gastrointestinal: Negative. Genitourinary: Negative. Musculoskeletal: Positive for back pain. Skin: Negative. Neurological: Negative. Endo/Heme/Allergies: Negative. Psychiatric/Behavioral: Negative. Pleasant and cooperative in no significant distress. Additional exam findings: We discussed the expected course, resolution and complications of the diagnosis(es) in detail. Medication risks, benefits, costs, interactions, and alternatives were discussed as indicated. I advised her to contact the office if her condition worsens, changes or fails to improve as anticipated. She expressed understanding with the diagnosis(es) and plan. Cintia Jj, was evaluated through a synchronous (real-time) audio-video encounter. The patient (or guardian if applicable) is aware that this is a billable service.  Verbal consent to proceed has been obtained within the past 12 months. The visit was conducted pursuant to the emergency declaration under the [address] [place] [address] and the [employer] and [employer] General Act. Patient identification was verified, and a caregiver was present when appropriate. The patient was located in a state where the provider was credentialed to provide care.     Renato Bazzi MD

## 2021-03-05 ENCOUNTER — OFFICE VISIT (OUTPATIENT)
Dept: SURGERY | Age: 46
End: 2021-03-05
Payer: MEDICAID

## 2021-03-05 VITALS
HEART RATE: 70 BPM | BODY MASS INDEX: 49.08 KG/M2 | TEMPERATURE: 98 F | DIASTOLIC BLOOD PRESSURE: 84 MMHG | HEIGHT: 60 IN | RESPIRATION RATE: 18 BRPM | SYSTOLIC BLOOD PRESSURE: 130 MMHG | WEIGHT: 250 LBS

## 2021-03-05 DIAGNOSIS — K91.2 POSTOPERATIVE MALABSORPTION: Primary | ICD-10-CM

## 2021-03-05 PROCEDURE — 99214 OFFICE O/P EST MOD 30 MIN: CPT | Performed by: SURGERY

## 2021-03-05 RX ORDER — GLUCOSAMINE/CHONDR SU A SOD 750-600 MG
TABLET ORAL
COMMUNITY

## 2021-03-05 NOTE — PROGRESS NOTES
Bariatric Follow-Up Evaluation    Anibal Fields is a 39 y.o. black female status post laparoscopic gastric bypass November 30, 2020 presents in follow-up with no specific complaint related to her bariatric surgery. Compliant with intolerant of a regular bariatric diet with appropriate early satiety no specific food intolerances or pathologic emesis. The patient has not attempted high-density carbohydrates and therefore has not experienced dumping syndrome  Compliant with multivitamin, calcium citrate, vitamin B1, B12, vitamin D supplementation  Exercise regimen: Walking 30 to 45 minutes on a daily basis  Comorbidities: Hypertension, gastroesophageal reflux disease, clinical obstructive sleep apnea-resolved                          Weight related arthropathy-improved  Preoperative weight: 309  Current weight: 250. Body mass index 48  Estimated weight loss: 151  Current weight loss: 59  Goal weight: 158  Percentage weight loss: 39%/3 and half months      Weight Loss Metrics 3/5/2021 3/5/2021 2/26/2021 12/15/2020 12/15/2020 11/30/2020 11/24/2020   Pre op / Initial Wt 309 - - 309 - - -   Today's Wt - 250 lb 257 lb - 287 lb - 309 lb   BMI - 48.82 kg/m2 50.19 kg/m2 - 56.05 kg/m2 60.35 kg/m2 -   Ideal Body Wt 120 - - 120 - - -   Excess Body Wt 189 - - 189 - - -   Goal Wt 158 - - 158 - - -   Wt loss to date 59 - - 22 - - -   % Wt Loss 0.39 - - 0.15 - - -   80% .2 - - 151.2 - - -       Allergies   Allergen Reactions    Lisinopril Other (comments)     Swollen lips       Current Outpatient Medications on File Prior to Visit   Medication Sig Dispense Refill    Biotin 2,500 mcg cap Take  by mouth.  cholecalciferol (VITAMIN D3) (5000 Units/125 mcg) tab tablet Take 4,000 Units by mouth daily.  thiamine HCL (Vitamin B-1) 100 mg tablet Take 100 mg by mouth daily.  cyanocobalamin 1,000 mcg tablet Take 1,000 mcg by mouth daily.       calcium citrate 200 mg (950 mg) tablet Take 600 mg by mouth three (3) times daily.  multivitamin capsule Take 1 Cap by mouth two (2) times a day. flinestones vitamin       predniSONE (DELTASONE) 20 mg tablet 2 tablets p.o. daily for 3 days then 1 tablet p.o. every morning for 3 days and 1/2 tablet p.o. every morning for 3 days 12 Tab 0    lidocaine (Aspercreme, lidocaine,) 4 % patch Apply to sore, tender area on your back or thigh as needed, remove after 8 hours 10 Patch 0     No current facility-administered medications on file prior to visit. Past Medical History:   Diagnosis Date    Anxiety and depression     Back pain     Constipation     Diverticulitis     Fibroids     GERD (gastroesophageal reflux disease)     HTN (hypertension)     no meds    Knee pain     Morbid obesity (HCC)     OA (osteoarthritis)     of knees       Past Surgical History:   Procedure Laterality Date    HX BREAST REDUCTION      HX COLONOSCOPY      HX GASTRIC BYPASS      HX GYN      btl and partial hysterectomy    [place] BREAST SURGERY PROCEDURE UNLISTED      reduction       Social History     Tobacco Use    Smoking status: Never Smoker    Smokeless tobacco: Never Used   Substance Use Topics    Alcohol use: Not Currently     Frequency: 2-4 times a month     Drinks per session: 3 or 4    Drug use: Not Currently       Family History   Problem Relation Age of Onset    Hypertension Mother     Hypertension Father        ROS:  General: Negative for fevers, chills, night sweats, fatigue, weight loss, or weight gain. GI: Negative for abdominal pain, change in bowel habits, hematochezia, melena, or GERD. Integumentary: Negative for dermatitis or abnormal moles.     HEENT: Negative for visual changes, vertigo, epistaxis, dysphagia, or hoarseness    Cardiac: Negative for chest pain, palpitations, hypertension, edema, or varicosities    Resp: Negative for cough, shortness of breath, wheezing, hemoptysis, snoring, or reactive airway disease    : Negative for hematuria, dysuria, frequency, urgency, nocturia, or stress urinary incontinence    MSK:  Negative for weakness, joint pain, or arthritis    Endocrine: Negative for diabetes, thyroid disease, polyuria, polydipsia, polyphagia, poor wound, heat intolerance, or cold intolerance. Lymph/Heme: Negative for anemia, bruising, or history of blood transfusions. Neuro:  Negative for dizziness, headache, fainting, seizures, and stroke. Psychiatry:  Negative for anxiety or depression    Physical Exam    Visit Vitals  /84   Pulse 70   Temp 98 °F (36.7 °C)   Resp 18   Ht 5' (1.524 m)   Wt 113.4 kg (250 lb)   LMP 07/16/2020   BMI 48.82 kg/m²       Nursing note reviewed. General: 39 y.o. female is in no acute distress. Head: Normocephalic, atraumatic  Resp: Clear to auscultation bilaterally, no wheezing, rhonchi, or rales, excursions normal and symmetrical.  Cardio: Regular rate and rhythm, no murmurs, clicks, gallops, or rubs. No edema or varicosities. Abdomen: Obese, soft, nontender, nondistended, normoactive bowel sounds, no hernias, no hepatosplenomegaly,   Psych: Alert and oriented to person, place, and time.     February 27, 2021 CBC, vitamin B1, iron, albumin-normal vitamin B1 level not performed    Impression    Clinically severe obesity status post gastric bypass November 30, 2020 with appropriate weight loss and comorbidity resolution      Plan    Continue compliance with bariatric surgical diet, exercise regimen and lab supplementation protocol as prescribed, encourage monthly attendance at bariatric support group meetings  Follow-up May 2021 with labs for 6-month bariatric surgical evaluation

## 2021-03-05 NOTE — PROGRESS NOTES
Chief Complaint   Patient presents with    Follow-up     [facility] 11/30/202   1. Have you been to the ER, urgent care clinic since your last visit? Hospitalized since your last visit? Yes for back pain    2. Have you seen or consulted any other health care providers outside of the [address] [place] [address] since your last visit? Include any pap smears or colon screening. No   Pt ID confirmed    Weight Loss Metrics 3/5/2021 3/5/2021 2/26/2021 12/15/2020 12/15/2020 11/30/2020 11/24/2020   Pre op / Initial Wt 309 - - 309 - - -   Today's Wt - 250 lb 257 lb - 287 lb - 309 lb   BMI - 48.82 kg/m2 50.19 kg/m2 - 56.05 kg/m2 60.35 kg/m2 -   Ideal Body Wt 120 - - 120 - - -   Excess Body Wt 189 - - 189 - - -   Goal Wt 158 - - 158 - - -   Wt loss to date 59 - - 22 - - -   % Wt Loss 0.39 - - 0.15 - - -   80% .2 - - 151.2 - - -       Body mass index is 48.82 kg/m².     Post op medications:  MVI: 2x  Calcium Citrate: 500 milligram 3x day  Actigal 0  B-12 1000 micrograms daily  Vit D 3 5000 units daily thiamine 100 milligram

## 2021-03-14 ENCOUNTER — APPOINTMENT (OUTPATIENT)
Dept: GENERAL RADIOLOGY | Age: 46
End: 2021-03-14
Attending: INTERNAL MEDICINE
Payer: MEDICAID

## 2021-03-14 ENCOUNTER — HOSPITAL ENCOUNTER (EMERGENCY)
Age: 46
Discharge: HOME | End: 2021-03-14
Attending: INTERNAL MEDICINE
Payer: MEDICAID

## 2021-03-14 VITALS
SYSTOLIC BLOOD PRESSURE: 137 MMHG | HEART RATE: 78 BPM | WEIGHT: 248 LBS | HEIGHT: 60 IN | OXYGEN SATURATION: 99 % | DIASTOLIC BLOOD PRESSURE: 73 MMHG | RESPIRATION RATE: 17 BRPM | TEMPERATURE: 98.5 F | BODY MASS INDEX: 48.69 KG/M2

## 2021-03-14 DIAGNOSIS — M72.2 PLANTAR FASCIITIS: Primary | ICD-10-CM

## 2021-03-14 PROCEDURE — 73630 X-RAY EXAM OF FOOT: CPT

## 2021-03-14 PROCEDURE — 74011250637 HC RX REV CODE- 250/637: Performed by: INTERNAL MEDICINE

## 2021-03-14 PROCEDURE — 99283 EMERGENCY DEPT VISIT LOW MDM: CPT

## 2021-03-14 RX ORDER — LANOLIN ALCOHOL/MO/W.PET/CERES
325 CREAM (GRAM) TOPICAL DAILY
COMMUNITY
End: 2021-11-08

## 2021-03-14 RX ORDER — ACETAMINOPHEN 500 MG
1000 TABLET ORAL
Status: COMPLETED | OUTPATIENT
Start: 2021-03-14 | End: 2021-03-14

## 2021-03-14 RX ADMIN — ACETAMINOPHEN 1000 MG: 500 TABLET ORAL at 08:05

## 2021-03-14 NOTE — ED TRIAGE NOTES
Pt reports left foot pain to arch of her foot, states she stands on her feet all day, reports this has happened before and was prescribed prednisone but her weight loss physician told her she should not take it. States this flare up started about a week ago.

## 2021-03-14 NOTE — ED PROVIDER NOTES
EMERGENCY DEPARTMENT HISTORY AND PHYSICAL EXAM      Date: 3/14/2021  Patient Name: Fortunato Hickey    History of Presenting Illness     Chief Complaint   Patient presents with    Foot Pain     left       History Provided By: Patient    HPI: Fortunato Hickey, 39 y.o. female with a past medical history significant gastric bypass; breast reduction; hysterectomy; HTN; GERD that presents to the ED with cc of pain in the left arch that is worse after standing. No trauma; states that she's had this in the past.     There are no other complaints, changes, or physical findings at this time. PCP: Neyda Elizalde NP    No current facility-administered medications on file prior to encounter. Current Outpatient Medications on File Prior to Encounter   Medication Sig Dispense Refill    ferrous sulfate 325 mg (65 mg iron) tablet Take 325 mg by mouth daily.  Biotin 2,500 mcg cap Take  by mouth.  [DISCONTINUED] predniSONE (DELTASONE) 20 mg tablet 2 tablets p.o. daily for 3 days then 1 tablet p.o. every morning for 3 days and 1/2 tablet p.o. every morning for 3 days 12 Tab 0    lidocaine (Aspercreme, lidocaine,) 4 % patch Apply to sore, tender area on your back or thigh as needed, remove after 8 hours 10 Patch 0    cholecalciferol (VITAMIN D3) (5000 Units/125 mcg) tab tablet Take 4,000 Units by mouth daily.  thiamine HCL (Vitamin B-1) 100 mg tablet Take 100 mg by mouth daily.  cyanocobalamin 1,000 mcg tablet Take 1,000 mcg by mouth daily.  calcium citrate 200 mg (950 mg) tablet Take 600 mg by mouth three (3) times daily.  multivitamin capsule Take 1 Cap by mouth two (2) times a day.  flinestones vitamin          Past History     Past Medical History:  Past Medical History:   Diagnosis Date    Anxiety and depression     Back pain     Constipation     Diverticulitis     Fibroids     GERD (gastroesophageal reflux disease)     HTN (hypertension)     no meds    Knee pain     Morbid obesity (HCC)     OA (osteoarthritis)     of knees       Past Surgical History:  Past Surgical History:   Procedure Laterality Date    HX BREAST REDUCTION      HX COLONOSCOPY      HX GASTRIC BYPASS      HX GYN      btl and partial hysterectomy    [place] BREAST SURGERY PROCEDURE UNLISTED      reduction       Family History:  Family History   Problem Relation Age of Onset    Hypertension Mother     Hypertension Father        Social History:  Social History     Tobacco Use    Smoking status: Never Smoker    Smokeless tobacco: Never Used   Substance Use Topics    Alcohol use: Not Currently     Frequency: 2-4 times a month     Drinks per session: 3 or 4    Drug use: Not Currently       Allergies: Allergies   Allergen Reactions    Lisinopril Other (comments)     Swollen lips         Review of Systems     Review of Systems   Constitutional: Negative for chills and fever. HENT: Negative for voice change. Eyes: Negative for visual disturbance. Respiratory: Negative for shortness of breath. Cardiovascular: Negative for chest pain. Gastrointestinal: Negative for abdominal distention and abdominal pain. Musculoskeletal: Positive for myalgias. Skin: Negative for rash. Neurological: Negative for headaches. Physical Exam  Vitals signs and nursing note reviewed. Constitutional:       General: She is not in acute distress. Appearance: Normal appearance. She is obese. She is not toxic-appearing. HENT:      Head: Normocephalic. Eyes:      Extraocular Movements: Extraocular movements intact. Pupils: Pupils are equal, round, and reactive to light. Neck:      Musculoskeletal: Neck supple. Cardiovascular:      Rate and Rhythm: Normal rate and regular rhythm. Pulmonary:      Effort: Pulmonary effort is normal.      Breath sounds: Normal breath sounds. Abdominal:      General: There is no distension. Tenderness: There is no abdominal tenderness.    Musculoskeletal: Comments: Flat feet; TTP in medial arch area; normal pulses; no deformity; no swelling   Skin:     General: Skin is warm and dry. Neurological:      General: No focal deficit present. Lab and Diagnostic Study Results     Labs -   No results found for this or any previous visit (from the past 12 hour(s)). Radiologic Studies -   @lastxrresult@  CT Results  (Last 48 hours)    None        CXR Results  (Last 48 hours)    None            Medical Decision Making   - I am the first provider for this patient. - I reviewed the vital signs, available nursing notes, past medical history, past surgical history, family history and social history. - Initial assessment performed. The patients presenting problems have been discussed, and they are in agreement with the care plan formulated and outlined with them. I have encouraged them to ask questions as they arise throughout their visit. Vital Signs-Reviewed the patient's vital signs. Patient Vitals for the past 12 hrs:   Temp Pulse Resp BP SpO2   03/14/21 0722 98.5 °F (36.9 °C) 78 17 137/73 99 %       Records Reviewed: Nursing Notes    ED Course:          Provider Notes (Medical Decision Making):   Plantar fasciitis; chronic; will refer to podiatry    Procedures       Disposition   Disposition: discharge  Discharged    DISCHARGE PLAN:  1. Current Discharge Medication List      CONTINUE these medications which have NOT CHANGED    Details   ferrous sulfate 325 mg (65 mg iron) tablet Take 325 mg by mouth daily. Biotin 2,500 mcg cap Take  by mouth.      lidocaine (Aspercreme, lidocaine,) 4 % patch Apply to sore, tender area on your back or thigh as needed, remove after 8 hours  Qty: 10 Patch, Refills: 0      cholecalciferol (VITAMIN D3) (5000 Units/125 mcg) tab tablet Take 4,000 Units by mouth daily. thiamine HCL (Vitamin B-1) 100 mg tablet Take 100 mg by mouth daily. cyanocobalamin 1,000 mcg tablet Take 1,000 mcg by mouth daily.       calcium citrate 200 mg (950 mg) tablet Take 600 mg by mouth three (3) times daily. multivitamin capsule Take 1 Cap by mouth two (2) times a day. flinestones vitamin            2.   Follow-up Information     Follow up With Specialties Details Why Contact Info    Sandy Galloway DPM Podiatry Schedule an appointment as soon as possible for a visit in 3 days  [address]  [phone]          3. Return to ED if worse   4. Current Discharge Medication List            Diagnosis     Clinical Impression:   1. Plantar fasciitis        Attestations:    Izzy Suárez MD    Please note that this dictation was completed with [employer], the computer voice recognition software. Quite often unanticipated grammatical, syntax, homophones, and other interpretive errors are inadvertently transcribed by the computer software. Please disregard these errors. Please excuse any errors that have escaped final proofreading. Thank you.

## 2021-03-14 NOTE — LETTER
[facility] EMERGENCY DEPT  [address] [address]  [phone]    Work/School Note    Date: 3/14/2021    To Whom It May concern:      Wendy Downing was seen and treated today in the emergency room by the following provider(s):  Attending Provider: Sanjay Irby MD.      Wendy Downing is excused from work/school on 03/14/21. She is clear to return to work/school on 03/15/21.         Sincerely,          Landon Wilkinson RN

## 2021-05-18 ENCOUNTER — HOSPITAL ENCOUNTER (OUTPATIENT)
Dept: LAB | Age: 46
Discharge: HOME | End: 2021-05-18
Payer: MEDICAID

## 2021-05-18 ENCOUNTER — TELEPHONE (OUTPATIENT)
Dept: SURGERY | Age: 46
End: 2021-05-18

## 2021-05-18 DIAGNOSIS — K91.2 POSTOPERATIVE MALABSORPTION: ICD-10-CM

## 2021-05-18 LAB
ALBUMIN SERPL-MCNC: 3.4 G/DL (ref 3.5–4.7)
ANION GAP SERPL CALC-SCNC: 9 MMOL/L
BASOPHILS # BLD: 0 K/UL (ref 0–0.1)
BASOPHILS NFR BLD: 0 % (ref 0–2)
BUN SERPL-MCNC: 8 MG/DL (ref 9–21)
BUN/CREAT SERPL: 13
CA-I BLD-MCNC: 9 MG/DL (ref 8.5–10.5)
CHLORIDE SERPL-SCNC: 103 MMOL/L (ref 94–111)
CO2 SERPL-SCNC: 25 MMOL/L (ref 21–33)
CREAT SERPL-MCNC: 0.6 MG/DL (ref 0.7–1.2)
EOSINOPHIL # BLD: 0.1 K/UL (ref 0–0.4)
EOSINOPHIL NFR BLD: 1 % (ref 0–5)
ERYTHROCYTE [DISTWIDTH] IN BLOOD BY AUTOMATED COUNT: 14 % (ref 11.6–14.5)
GLUCOSE SERPL-MCNC: 87 MG/DL (ref 70–110)
HCT VFR BLD AUTO: 36.5 % (ref 35–45)
HGB BLD-MCNC: 11.5 G/DL (ref 12–16)
IMM GRANULOCYTES # BLD AUTO: 0 K/UL
IMM GRANULOCYTES NFR BLD AUTO: 0 %
LYMPHOCYTES # BLD: 3.1 K/UL (ref 0.9–3.6)
LYMPHOCYTES NFR BLD: 36 % (ref 21–52)
MCH RBC QN AUTO: 29.3 PG (ref 24–34)
MCHC RBC AUTO-ENTMCNC: 31.5 G/DL (ref 31–37)
MCV RBC AUTO: 92.9 FL (ref 74–97)
MONOCYTES # BLD: 0.7 K/UL (ref 0.05–1.2)
MONOCYTES NFR BLD: 8 % (ref 3–10)
NEUTS SEG # BLD: 4.8 K/UL (ref 1.8–8)
NEUTS SEG NFR BLD: 55 % (ref 40–73)
PLATELET # BLD AUTO: 279 K/UL (ref 135–420)
PMV BLD AUTO: 10.5 FL (ref 9.2–11.8)
POTASSIUM SERPL-SCNC: 3.5 MMOL/L (ref 3.2–5.1)
RBC # BLD AUTO: 3.93 M/UL (ref 4.2–5.3)
SODIUM SERPL-SCNC: 137 MMOL/L (ref 135–145)
WBC # BLD AUTO: 8.6 K/UL (ref 4.6–13.2)

## 2021-05-18 PROCEDURE — 82040 ASSAY OF SERUM ALBUMIN: CPT

## 2021-05-18 PROCEDURE — 84425 ASSAY OF VITAMIN B-1: CPT

## 2021-05-18 PROCEDURE — 85025 COMPLETE CBC W/AUTO DIFF WBC: CPT

## 2021-05-18 PROCEDURE — 82607 VITAMIN B-12: CPT

## 2021-05-18 PROCEDURE — 83540 ASSAY OF IRON: CPT

## 2021-05-18 PROCEDURE — 80048 BASIC METABOLIC PNL TOTAL CA: CPT

## 2021-05-18 PROCEDURE — 36415 COLL VENOUS BLD VENIPUNCTURE: CPT

## 2021-05-18 PROCEDURE — 82728 ASSAY OF FERRITIN: CPT

## 2021-05-18 PROCEDURE — 82306 VITAMIN D 25 HYDROXY: CPT

## 2021-05-19 LAB
25(OH)D3 SERPL-MCNC: 49.7 NG/ML (ref 30–100)
FERRITIN SERPL-MCNC: 26 NG/ML (ref 26–388)
FOLATE SERPL-MCNC: 14.3 NG/ML (ref 5–21)
IRON SERPL-MCNC: 116 UG/DL (ref 35–150)
VIT B12 SERPL-MCNC: 956 PG/ML (ref 193–986)

## 2021-05-23 LAB — VIT B1 BLD-SCNC: 250.2 NMOL/L (ref 66.5–200)

## 2021-05-28 ENCOUNTER — OFFICE VISIT (OUTPATIENT)
Dept: SURGERY | Age: 46
End: 2021-05-28
Payer: MEDICAID

## 2021-05-28 VITALS
HEART RATE: 80 BPM | DIASTOLIC BLOOD PRESSURE: 89 MMHG | HEIGHT: 60 IN | TEMPERATURE: 97.8 F | SYSTOLIC BLOOD PRESSURE: 139 MMHG | RESPIRATION RATE: 20 BRPM | WEIGHT: 241 LBS | BODY MASS INDEX: 47.32 KG/M2

## 2021-05-28 DIAGNOSIS — E66.01 MORBID OBESITY WITH BMI OF 60.0-69.9, ADULT (HCC): ICD-10-CM

## 2021-05-28 DIAGNOSIS — K91.2 POSTOPERATIVE MALABSORPTION: Primary | ICD-10-CM

## 2021-05-28 DIAGNOSIS — Z98.84 S/P GASTRIC BYPASS: ICD-10-CM

## 2021-05-28 PROCEDURE — 99213 OFFICE O/P EST LOW 20 MIN: CPT | Performed by: NURSE PRACTITIONER

## 2021-05-28 NOTE — PATIENT INSTRUCTIONS
May dec B1 to every other day or reduce dose by half   Increase protein nutrition     Please follow up with the dietitian. Marilyn Baptiste RD,-- Bariatric Dietitian at [email or web address] / Frida Alberts RD -- Bariatric Dietitian at [facility]  [phone] / [email or web address]          Blood Sugar Fluctuations After Gastric Bypass  Reactive Hypoglycemia  It is important to understand the unique changes that occur in your gastrointestinal tract after gastric bypass with regard to glucose absorption. Glucose is the basic unit of sugar and makes up nearly all carbohydrate foods (sweets, including candy and cookies; starches, including crackers and pretzels; and fruits). The anatomic changes in rerouting the stomach and intestines allows for rapid absorption of glucose into the bloodstream. This is a multistep process as follows:  1. Carbohydrate foods are chewed in the mouth (chips, crackers, etc...) and are exposed to an enzyme or chemical in the saliva (spit) that breaks it down to glucose--prior to swallowing. 2. Glucose/saliva mix is swallowed and spends very little time in the small gastric pouch as this solution is liquid/soft and not dense, therefore passes easily into the small intestine connection  3. Once in the small intestine, approx. 5-10 minutes after swallowing, the glucose is rapidly absorbed in the bloodstream and blood sugar rises to above normal levels (up to 200's in some cases). Most patients report no adverse feelings or symptoms from elevated blood sugar at this point. 4. After approximately 1-2 hours, the pancreas (organ responsible for regulating blood sugars) releases insulin (hormone that adjusts blood sugar) into the bloodstream in order to bring down the high blood sugar level.  However, because of the delay in timing and the anatomic changes from the surgery, often the pancreas releases too much insulin, causing blood sugar to drop below (often quite below) normal levels (as low as 30's-40's). 5. When blood sugar drops to low levels (hypoglycemia), patients experience a range of symptoms including; significant and sudden fatigue, sweating, shaking, dizziness, anxiety, hunger, and possible fainting or loss of consciousness. There is often variability in the number and severity of these symptoms. 6. When in a low blood sugar state, the patient often feels that they must ingest high carbohydrate foods or drinks in order to rapidly bring blood sugar back to normal. Unfortunately, doing this restarts the high blood sugar--low blood sugar process all over again (see steps 1-5 above). 7. Repeatedly experiencing this process, called reactive hypoglycemia often leads to weight regain, chronic fatigue, and could cause unexpected loss of consciousness, resulting in injury to the patient or others (especially if occurs while driving). 8. The most effective way to avoid experiencing reactive hypoglycemia and its serious consequences is to avoid carbohydrate foods in the diet. Sticking to the advised meal plan of proteins, vegetables, and healthy fats will keep blood sugar levels stable and assist with long term weight maintenance and prevention of obesity disease relapse.

## 2021-05-28 NOTE — PROGRESS NOTES
Donna Vanessa is a 39 y.o. female that presents today to follow up post  LGBP  preformed on 11/30/2020. Pt says pain level is at a 0 on a scale from 1-10. Pt is drinking 64oz of fluid daily. Pt is currently eating protein shakes,eggs,chicken, turkey, broccoli, corn, carrots, cucumber, mashed potatoes, snap peas, occ rice or pasta once or twice a week honey roasted peanuts 3 times a week. Pt says the amount of time spent exercising is walk 30-45 minutes twice a week. Body mass index is 47.07 kg/m². 1. Have you been to the ER, urgent care clinic since your last visit? Hospitalized since your last visit? No    2. Have you seen or consulted any other health care providers outside of the [address] [place] [address] since your last visit? Include any pap smears or colon screening.  No

## 2021-05-28 NOTE — PROGRESS NOTES
Bariatric Postoperative Progress Note    Sundar Rausch is a 39 y.o. female is now 6 months status post laparoscopic gastric bypass surgery. Pt says pain level is at a 0 on a scale from 1-10. Pt is drinking 64oz of fluid daily. Pt is currently eating protein shakes,eggs,chicken, turkey, broccoli, corn, carrots, cucumber, mashed potatoes, snap peas, occ rice or pasta once or twice a week honey roasted peanuts 3 times a week. Pt says the amount of time spent exercising is walk 30-45 minutes twice a week. Thinks she is experiencing 'mental hunger.'     Patient goal 160 lbs     Weight Loss Metrics 5/28/2021 5/28/2021 3/14/2021 3/5/2021 3/5/2021 2/26/2021 12/15/2020   Pre op / Initial Wt 309 - - 309 - - 309   Today's Wt - 241 lb 248 lb - 250 lb 257 lb -   BMI - 47.07 kg/m2 48.43 kg/m2 - 48.82 kg/m2 50.19 kg/m2 -   Ideal Body Wt 119 - - 120 - - 120   Excess Body Wt 190 - - 189 - - 189   Goal Wt 144 - - 158 - - 158   Wt loss to date 68 - - 59 - - 22   % Wt Loss 0.45 - - 0.39 - - 0.15   80%  - - 151.2 - - 151.2     Comorbidities: Hypertension, gastroesophageal reflux disease, clinical obstructive sleep apnea-resolved                          Weight related arthropathy-improved        Past Medical History:   Diagnosis Date    Anxiety and depression     Back pain     Constipation     Diverticulitis     Fibroids     GERD (gastroesophageal reflux disease)     HTN (hypertension)     no meds    Knee pain     Morbid obesity (HCC)     OA (osteoarthritis)     of knees       Past Surgical History:   Procedure Laterality Date    HX BREAST REDUCTION      HX COLONOSCOPY      HX GASTRIC BYPASS      HX GYN      btl and partial hysterectomy    HX LAP GASTRIC BYPASS  11/30/2020    IA BREAST SURGERY PROCEDURE UNLISTED      reduction       Current Outpatient Medications   Medication Sig Dispense Refill    ferrous sulfate 325 mg (65 mg iron) tablet Take 325 mg by mouth daily.       Biotin 2,500 mcg cap Take by mouth.  lidocaine (Aspercreme, lidocaine,) 4 % patch Apply to sore, tender area on your back or thigh as needed, remove after 8 hours 10 Patch 0    cholecalciferol (VITAMIN D3) (5000 Units/125 mcg) tab tablet Take 4,000 Units by mouth daily.  thiamine HCL (Vitamin B-1) 100 mg tablet Take 100 mg by mouth daily.  cyanocobalamin 1,000 mcg tablet Take 1,000 mcg by mouth daily.  calcium citrate 200 mg (950 mg) tablet Take 600 mg by mouth three (3) times daily.  multivitamin capsule Take 1 Cap by mouth two (2) times a day. flinestones vitamin          Allergies   Allergen Reactions    Lisinopril Other (comments)     Swollen lips       ROS:  Review of Systems   Constitutional: Positive for weight loss. Hunger          Physicial Exam:  Visit Vitals  /89 (BP 1 Location: Left upper arm, BP Patient Position: At rest, BP Cuff Size: Adult)   Pulse 80   Temp 97.8 °F (36.6 °C) (Oral)   Resp 20   Ht 5' (1.524 m)   Wt 109.3 kg (241 lb)   LMP 07/16/2020   BMI 47.07 kg/m²     Physical Exam  Vitals and nursing note reviewed. Constitutional:       Appearance: She is obese. HENT:      Head: Normocephalic and atraumatic. Eyes:      Pupils: Pupils are equal, round, and reactive to light. Cardiovascular:      Rate and Rhythm: Normal rate. Heart sounds: Normal heart sounds. Pulmonary:      Effort: Pulmonary effort is normal.      Breath sounds: Normal breath sounds. Abdominal:      General: There is no distension. Palpations: Abdomen is soft. There is no mass. Tenderness: There is no abdominal tenderness. Hernia: No hernia is present. Musculoskeletal:         General: Normal range of motion. Skin:     General: Skin is warm and dry. Neurological:      Mental Status: She is alert and oriented to person, place, and time.    Psychiatric:         Mood and Affect: Mood and affect normal.         Behavior: Behavior normal.         Labs:   Recent Results (from the past 672 hour(s))   ALBUMIN    Collection Time: 05/18/21 11:09 AM   Result Value Ref Range    Albumin 3.4 (L) 3.5 - 4.7 g/dL   CBC WITH AUTOMATED DIFF    Collection Time: 05/18/21 11:09 AM   Result Value Ref Range    WBC 8.6 4.6 - 13.2 K/uL    RBC 3.93 (L) 4.20 - 5.30 M/uL    HGB 11.5 (L) 12.0 - 16.0 g/dL    HCT 36.5 35.0 - 45.0 %    MCV 92.9 74.0 - 97.0 FL    MCH 29.3 24.0 - 34.0 PG    MCHC 31.5 31.0 - 37.0 g/dL    RDW 14.0 11.6 - 14.5 %    PLATELET 127 431 - 543 K/uL    MPV 10.5 9.2 - 11.8 FL    NEUTROPHILS 55 40 - 73 %    LYMPHOCYTES 36 21 - 52 %    MONOCYTES 8 3 - 10 %    EOSINOPHILS 1 0 - 5 %    BASOPHILS 0 0 - 2 %    IMMATURE GRANULOCYTES 0 %    ABS. NEUTROPHILS 4.8 1.8 - 8.0 K/UL    ABS. LYMPHOCYTES 3.1 0.9 - 3.6 K/UL    ABS. MONOCYTES 0.7 0.05 - 1.2 K/UL    ABS. EOSINOPHILS 0.1 0.0 - 0.4 K/UL    ABS. BASOPHILS 0.0 0.0 - 0.1 K/UL    ABS. IMM.  GRANS. 0.0 K/UL   FERRITIN    Collection Time: 05/18/21 11:09 AM   Result Value Ref Range    Ferritin 26 26 - 388 ng/mL   IRON    Collection Time: 05/18/21 11:09 AM   Result Value Ref Range    Iron 116 35 - 093 ug/dL   METABOLIC PANEL, BASIC    Collection Time: 05/18/21 11:09 AM   Result Value Ref Range    Sodium 137 135 - 145 mmol/L    Potassium 3.5 3.2 - 5.1 mmol/L    Chloride 103 94 - 111 mmol/L    CO2 25 21 - 33 mmol/L    Anion gap 9 mmol/L    Glucose 87 70 - 110 mg/dL    BUN 8 (L) 9 - 21 mg/dL    Creatinine 0.60 (L) 0.70 - 1.20 mg/dL    BUN/Creatinine ratio 13      GFR est AA >60 ml/min/1.73m2    GFR est non-AA >60 ml/min/1.73m2    Calcium 9.0 8.5 - 10.5 mg/dL   VITAMIN B1, WHOLE BLOOD    Collection Time: 05/18/21 11:09 AM   Result Value Ref Range    Vitamin B1 250.2 (H) 66.5 - 200.0 nmol/L   VITAMIN B12 & FOLATE    Collection Time: 05/18/21 11:09 AM   Result Value Ref Range    Vitamin B12 956 193 - 986 pg/mL    Folate 14.3 5.0 - 21.0 ng/mL   VITAMIN D, 25 HYDROXY    Collection Time: 05/18/21 11:09 AM   Result Value Ref Range    Vitamin D 25-Hydroxy 49.7 30 - 100 ng/mL Assessment/Plan: Today, Britney Barnard is  Height: 5' (152.4 cm) tall, Weight: 109.3 kg (241 lb) lbs for a Body mass index is 47.07 kg/m². Labs were reviewed and pt was instructed to increase protein nutrition, may dec B12 to every other day otherwise continue MVI/Ca/B12/D3 supplementation. We have reviewed the components of a successful postoperative course including requirement for a high protein, low carbohydrate diet, 64 oz a day of zero calorie liquids, daily vitamin supplementation, daily exercise (150 mis/week), regular follow-up, and participation in support groups. Lengthy disc re: CHO metabolism after GBP and risk of dangerous reactive hypoglycemia--urged pt to avoid CHO foods    Refer to registered dietitian for more detailed medical nutrition therapy as needed. The primary encounter diagnosis was Postoperative malabsorption. Diagnoses of Morbid obesity with BMI of 60.0-69.9, adult ([address] BMI 45.0-49.9, adult ([address] and S/P gastric bypass were also pertinent to this visit. Follow-up and Dispositions    · Return in about 6 months (around 11/28/2021), or if symptoms worsen or fail to improve. with labs, sooner as needed.   Sohail Mar NP

## 2021-08-30 ENCOUNTER — OFFICE VISIT (OUTPATIENT)
Dept: SURGERY | Age: 46
End: 2021-08-30
Payer: MEDICAID

## 2021-08-30 VITALS
SYSTOLIC BLOOD PRESSURE: 120 MMHG | HEIGHT: 60 IN | TEMPERATURE: 97.3 F | HEART RATE: 74 BPM | WEIGHT: 236.8 LBS | RESPIRATION RATE: 18 BRPM | DIASTOLIC BLOOD PRESSURE: 80 MMHG | BODY MASS INDEX: 46.49 KG/M2

## 2021-08-30 DIAGNOSIS — I10 ESSENTIAL HYPERTENSION: ICD-10-CM

## 2021-08-30 DIAGNOSIS — Z98.84 S/P GASTRIC BYPASS: ICD-10-CM

## 2021-08-30 DIAGNOSIS — Z98.84 BARIATRIC SURGERY STATUS: ICD-10-CM

## 2021-08-30 DIAGNOSIS — K91.2 POSTOPERATIVE INTESTINAL MALABSORPTION: ICD-10-CM

## 2021-08-30 DIAGNOSIS — K91.2 POSTOPERATIVE MALABSORPTION: ICD-10-CM

## 2021-08-30 DIAGNOSIS — E66.01 OBESITY, MORBID (HCC): Primary | ICD-10-CM

## 2021-08-30 PROCEDURE — 99213 OFFICE O/P EST LOW 20 MIN: CPT | Performed by: NURSE PRACTITIONER

## 2021-08-30 NOTE — PROGRESS NOTES
Bariatric Postoperative Progress Note  Nikita Terry is a 42 y.o. female is 9 months s/p GBP, not in any pain. . Taking in 64 oz water,  40 plus g protein. Patient is currently eating eggs,cucumbers,salad, chicken. Patient says the amount of time spent exercising is 40 mins of walking 5 times a week. Bowel movements are alternating constipation and regularity. She is compliant with multivitamins, calcium, Vit D and B12 supplements. Patient states goal: <200lbs     Denies dumping     Requesting help with slow weight loss     Weight Loss Metrics 8/30/2021 8/30/2021 5/28/2021 5/28/2021 3/14/2021 3/5/2021 3/5/2021   Pre op / Initial Wt 309 - 309 - - 309 -   Today's Wt - 236 lb 12.8 oz - 241 lb 248 lb - 250 lb   BMI - 46.25 kg/m2 - 47.07 kg/m2 48.43 kg/m2 - 48.82 kg/m2   Ideal Body Wt 119 - 119 - - 120 -   Excess Body Wt 190 - 190 - - 189 -   Goal Wt - - 144 - - 158 -   Wt loss to date 72.2 - 68 - - 59 -   % Wt Loss 0.48 - 0.45 - - 0.39 -   80%  - 152 - - 151.2 -         Past Medical History:   Diagnosis Date    Anxiety and depression     Back pain     Constipation     Diverticulitis     Fibroids     GERD (gastroesophageal reflux disease)     HTN (hypertension)     no meds    Knee pain     Morbid obesity (HCC)     OA (osteoarthritis)     of knees     Past Surgical History:   Procedure Laterality Date    HX BREAST REDUCTION      HX COLONOSCOPY      HX GASTRIC BYPASS      HX GYN      btl and partial hysterectomy    HX LAP GASTRIC BYPASS  11/30/2020    HI BREAST SURGERY PROCEDURE UNLISTED      reduction       Current Outpatient Medications   Medication Sig Dispense Refill    ferrous sulfate 325 mg (65 mg iron) tablet Take 325 mg by mouth daily.  Biotin 2,500 mcg cap Take  by mouth.  cholecalciferol (VITAMIN D3) (5000 Units/125 mcg) tab tablet Take 5,000 Units by mouth daily.  thiamine HCL (Vitamin B-1) 100 mg tablet Take 100 mg by mouth daily.       cyanocobalamin 1,000 mcg tablet Take 1,000 mcg by mouth daily.  calcium citrate 200 mg (950 mg) tablet Take 500 mg by mouth three (3) times daily.  multivitamin capsule Take 1 Cap by mouth two (2) times a day. flinestones vitamin          Allergies   Allergen Reactions    Lisinopril Other (comments)     Swollen lips       Physicial Exam:  Visit Vitals  /80   Pulse 74   Temp 97.3 °F (36.3 °C) (Temporal)   Resp 18   Ht 5' (1.524 m)   Wt 107.4 kg (236 lb 12.8 oz)   LMP 07/16/2020   BMI 46.25 kg/m²     Physical Exam  Vitals and nursing note reviewed. Constitutional:       Appearance: She is obese. HENT:      Head: Normocephalic and atraumatic. Eyes:      Pupils: Pupils are equal, round, and reactive to light. Neck:      Vascular: No carotid bruit. Cardiovascular:      Rate and Rhythm: Normal rate. Heart sounds: Normal heart sounds. Pulmonary:      Effort: Pulmonary effort is normal.      Breath sounds: Normal breath sounds. Abdominal:      General: There is no distension. Palpations: Abdomen is soft. There is no mass. Tenderness: There is no abdominal tenderness. Hernia: No hernia is present. Musculoskeletal:         General: Normal range of motion. Cervical back: Normal range of motion and neck supple. No tenderness. Skin:     General: Skin is warm and dry. Neurological:      Mental Status: She is alert and oriented to person, place, and time. Mental status is at baseline. Psychiatric:         Mood and Affect: Mood and affect normal.         Behavior: Behavior normal.         Labs: 5/18/21 reviewed   No results found for this or any previous visit (from the past 672 hour(s)). Assessment/Plan: Today, Colin Frod is  Height: 5' (152.4 cm) tall, Weight: 107.4 kg (236 lb 12.8 oz) lbs for a Body mass index is 46.25 kg/m². Labs were reviewed and pt was instructed to focus on protein nutrition, hydrate, decrease  B1 dose by 1/2.      Goal: 2-6lbs     We have reviewed the components of a successful postoperative course including requirement for a high protein, low carbohydrate diet, 64 oz a day of zero calorie liquids, daily vitamin supplementation, daily exercise (150 mis/week), regular follow-up, and participation in support groups. Refer to registered dietitian for more detailed medical nutrition therapy. Pt desires weight loss medications --reviewed potential SE, risks, benefits. Gave 0 tabs, pending EKG       The primary encounter diagnosis was Obesity, morbid ([address] Diagnoses of Bariatric surgery status, Postoperative malabsorption, S/P gastric bypass, Postoperative intestinal malabsorption, and Essential hypertension were also pertinent to this visit. Follow-up and Dispositions    · Return in about 1 month (around 9/30/2021), or if symptoms worsen or fail to improve, for routine bariatric follow up with labs, medication follow up.          Padmini Bryson NP

## 2021-08-30 NOTE — PATIENT INSTRUCTIONS
Please follow up with the dietitian. King Ale, RD,-- Bariatric Dietitian at [address] [facility] / [address] [facility]                                                             [address], [place]                               Phone:                                                                      Fax:  ________________________________________________________________________________    Patient Name: Elisabet Craig [id number])  Sex: Female  : 1975      [address] [facility]    [phone]    Primary Coverage                Secondary Coverage                Payor: [facility] MEDICAID            No secondary coverage found. Plan: [facility] COMPLETE CARE                                     Subscriber Information                                            ID: [id number]                                                  Name: Lisandra Lanza                                         SSN: [id number]                                                  Address: [address] [place] [address] No: [id number]                                                  [facility] Status:  ________________________________________________________________________________    [facility] Physician ID: Radames Rodriguez     Ordering Physician: <April MENDOZAIN>  Sena Fisher    Order Specific Information  Order: EKG, 12 LEAD, INITIAL [CPT(R): 35877]  Order #: [id number]: 1 FUTURE    Priority: Routine  Class:  Ancillary Performed    Future Order Information      Expires on:2022            Expected by:2021                   Associated Diagnoses      E66.01 Obesity, morbid ([address]      Z98.84 Bariatric surgery status      K91.2 Postoperative malabsorption      Z98.84 S/P gastric bypass      K91.2 Postoperative intestinal malabsorption      Reason for Exam: -> eval prior to treatment                     Start Date:08/30/21    Process Information:   Electronically Signed By:    Ordering Physician: <No UPIN>  Cayla Topete    Printed: 8/30/21   3:07 PM            [facility] [address] Page 1 of 1   Req/Control #: [id number] [employer] Unless Marked - [id number]    Collection D/T:   Client Chris Fountain [ ] - Brandongeorgiana Mclean By:    Self Pay [ ] - [id number]       Office/Provider Information    Account Name:    Client ID: [id number]          LCA ID: [id number]  [facility]                              Address:  [address] [place] [address]  Phone: [phone]  Fax: [phone] Provider:           Trista Pérez NP     Provider NPI:   [id number]          Patient Information:   Name: Rhiannon Hanna   Gender: Female   YOB: 1975   Age: 42 years   Address: [address] [place] [address] Zip: [address]    SSN: [id number]   Patient ID: [id number]   Phone: [phone]       Alt Control#: [id number]   Alt Patient ID:              CPT CODE TESTS ORDERED (Total: 9) PRIORITY ORDERED EXPECTED EXPIRES          [ ] BCF - Add Blood Collection Fee       92223 CBCXA CBC WITH AUTOMATED DIFF Routine 8/30/2021 8/30/2021 8/31/2022   42503 HA1C HEMOGLOBIN A1C WITH EAG Routine 8/30/2021 8/30/2021 8/31/2022   07137 FERR FERRITIN Routine 8/30/2021 8/30/2021 8/31/2022   60689 MP METABOLIC PANEL, COMPREHENSIVE Routine 8/30/2021 8/30/2021 8/31/2022   76265 LIPID LIPID PANEL Routine 8/30/2021 8/30/2021 8/31/2022   91834 VIB1L VITAMIN B1, WHOLE BLOOD Routine 8/30/2021 8/30/2021 8/31/2022   22243 B12FO VITAMIN B12 & FOLATE Routine 8/30/2021 8/30/2021 8/31/2022   27480 VITD VITAMIN D, 25 HYDROXY Routine 8/30/2021 8/30/2021 8/31/2022   31796 TSH TSH 3RD GENERATION Routine 8/30/2021 8/30/2021 8/31/2022         Comments:         Specimen Type:   (142662) CBC WITH AUTOMATED DIFF:   Whole Blood    (189446) HEMOGLOBIN A1C WITH EAG:   Whole Blood      (194704) FERRITIN:   Serum    (844304) METABOLIC PANEL, COMPREHENSIVE:   Serum      (228320) LIPID PANEL:   Serum    (834517) VITAMIN B1, WHOLE BLOOD:   Blood      (093520) VITAMIN B12 & FOLATE:   Serum    (145972) VITAMIN D, 25 HYDROXY:   Serum      (021803) TSH 3RD GENERATION:   Serum             Diagnosis Codes:   E66.01 Z98.84 K91.2 I10           Bill Type:      Ins Code: Not on file               Responsible Party / Guarantor Information:   Name: Maxwell William   Address: [address] [place] [address] Zip: [place], [address] [place] [address] Avenue   Phone: [phone]   Relation to Pt: Self   Employer Name: [employer]          ABN:      Worker's Comp: N Date of Injury:              Insurance Information:   Primary Insurance: Secondary Insurance:   Ins Co Name: [address] [place] [address]   Address 1: [address] [place] [address]   Address 2: [facility] Zip: [address] [place]   Policy Number: [id number]   Group #: [id number]    Ins Co Name:     Address 1:     Address 2:     [place], [place] Zip:     Policy Number:     Group #:        Primary Policy [place] / Insured: Secondary Policy Ghulam Mitchell / Jeannette Dach:   Name: Maxwell William   Address: [id number] [place] [address], [facility]   Pt Relation to Subscriber: Self    Name:     Address:          Pt Relation to Subscriber:                  Order Providers    Authorizing Provider Encounter Provider   COLLEEN Leon NP Dwayne Shake   1975 [id number]    Maxwell William   [id number]    Maxwell Shake   [id number]    Maxwell Shake   1975 [id number]      Maxwell Shake   1975 [phone]    Maxwell Shake   1975 [id number]    CHRISTY GARCIA Isaac Severance   1975 [phone]    [facility]   1975 [id number]

## 2021-09-03 ENCOUNTER — HOSPITAL ENCOUNTER (OUTPATIENT)
Dept: LAB | Age: 46
Discharge: HOME | End: 2021-09-03
Payer: MEDICAID

## 2021-09-03 DIAGNOSIS — K91.2 POSTOPERATIVE MALABSORPTION: ICD-10-CM

## 2021-09-03 DIAGNOSIS — Z98.84 BARIATRIC SURGERY STATUS: ICD-10-CM

## 2021-09-03 DIAGNOSIS — I10 ESSENTIAL HYPERTENSION: ICD-10-CM

## 2021-09-03 DIAGNOSIS — E66.01 OBESITY, MORBID (HCC): ICD-10-CM

## 2021-09-03 DIAGNOSIS — K91.2 POSTOPERATIVE INTESTINAL MALABSORPTION: ICD-10-CM

## 2021-09-03 DIAGNOSIS — Z98.84 S/P GASTRIC BYPASS: ICD-10-CM

## 2021-09-03 LAB
25(OH)D3 SERPL-MCNC: 40.8 NG/ML (ref 30–100)
ALBUMIN SERPL-MCNC: 3.6 G/DL (ref 3.5–4.7)
ALBUMIN/GLOB SERPL: 1 {RATIO}
ALP SERPL-CCNC: 25 U/L (ref 38–126)
ALT SERPL-CCNC: 14 U/L (ref 3–52)
ANION GAP SERPL CALC-SCNC: 8 MMOL/L
AST SERPL W P-5'-P-CCNC: 12 U/L (ref 14–74)
BASOPHILS # BLD: 0.1 K/UL (ref 0–0.1)
BASOPHILS NFR BLD: 1 % (ref 0–2)
BILIRUB SERPL-MCNC: 0.6 MG/DL (ref 0.2–1)
BUN SERPL-MCNC: 7 MG/DL (ref 9–21)
BUN/CREAT SERPL: 14
CA-I BLD-MCNC: 9.2 MG/DL (ref 8.5–10.5)
CHLORIDE SERPL-SCNC: 103 MMOL/L (ref 94–111)
CO2 SERPL-SCNC: 27 MMOL/L (ref 21–33)
CREAT SERPL-MCNC: 0.5 MG/DL (ref 0.7–1.2)
DIFFERENTIAL METHOD BLD: ABNORMAL
EOSINOPHIL # BLD: 0.2 K/UL (ref 0–0.4)
EOSINOPHIL NFR BLD: 3 % (ref 0–5)
ERYTHROCYTE [DISTWIDTH] IN BLOOD BY AUTOMATED COUNT: 12.9 % (ref 11.6–14.5)
EST. AVERAGE GLUCOSE BLD GHB EST-MCNC: 108 MG/DL
FERRITIN SERPL-MCNC: 63 NG/ML (ref 8–388)
FOLATE SERPL-MCNC: 18.6 NG/ML (ref 3.1–17.5)
GLOBULIN SER CALC-MCNC: 3.6 G/DL
GLUCOSE SERPL-MCNC: 84 MG/DL (ref 70–110)
HBA1C MFR BLD: 5.4 % (ref 4.2–5.6)
HCT VFR BLD AUTO: 35.8 % (ref 35–45)
HGB BLD-MCNC: 11.5 G/DL (ref 12–16)
IMM GRANULOCYTES # BLD AUTO: 0 K/UL (ref 0–0.04)
IMM GRANULOCYTES NFR BLD AUTO: 0 % (ref 0–0.5)
LYMPHOCYTES # BLD: 3.1 K/UL (ref 0.9–3.6)
LYMPHOCYTES NFR BLD: 34 % (ref 21–52)
MCH RBC QN AUTO: 30.6 PG (ref 24–34)
MCHC RBC AUTO-ENTMCNC: 32.1 G/DL (ref 31–37)
MCV RBC AUTO: 95.2 FL (ref 78–100)
MONOCYTES # BLD: 0.6 K/UL (ref 0.05–1.2)
MONOCYTES NFR BLD: 6 % (ref 3–10)
NEUTS SEG # BLD: 5.2 K/UL (ref 1.8–8)
NEUTS SEG NFR BLD: 56 % (ref 40–73)
NRBC # BLD: 0 K/UL (ref 0–0.01)
NRBC BLD-RTO: 0 PER 100 WBC
PLATELET # BLD AUTO: 338 K/UL (ref 135–420)
PMV BLD AUTO: 10.4 FL (ref 9.2–11.8)
POTASSIUM SERPL-SCNC: 3.8 MMOL/L (ref 3.2–5.1)
PROT SERPL-MCNC: 7.2 G/DL (ref 6.1–8.4)
RBC # BLD AUTO: 3.76 M/UL (ref 4.2–5.3)
SODIUM SERPL-SCNC: 138 MMOL/L (ref 135–145)
TSH SERPL DL<=0.05 MIU/L-ACNC: 1.72 UIU/ML (ref 0.35–6.2)
VIT B12 SERPL-MCNC: 932 PG/ML (ref 211–911)
WBC # BLD AUTO: 9.1 K/UL (ref 4.6–13.2)

## 2021-09-03 PROCEDURE — 82728 ASSAY OF FERRITIN: CPT

## 2021-09-03 PROCEDURE — 80053 COMPREHEN METABOLIC PANEL: CPT

## 2021-09-03 PROCEDURE — 83036 HEMOGLOBIN GLYCOSYLATED A1C: CPT

## 2021-09-03 PROCEDURE — 85025 COMPLETE CBC W/AUTO DIFF WBC: CPT

## 2021-09-03 PROCEDURE — 82607 VITAMIN B-12: CPT

## 2021-09-03 PROCEDURE — 36415 COLL VENOUS BLD VENIPUNCTURE: CPT

## 2021-09-03 PROCEDURE — 82306 VITAMIN D 25 HYDROXY: CPT

## 2021-09-03 PROCEDURE — 84443 ASSAY THYROID STIM HORMONE: CPT

## 2021-09-15 ENCOUNTER — TRANSCRIBE ORDER (OUTPATIENT)
Dept: SCHEDULING | Age: 46
End: 2021-09-15

## 2021-09-15 DIAGNOSIS — Z12.31 VISIT FOR SCREENING MAMMOGRAM: Primary | ICD-10-CM

## 2021-09-20 ENCOUNTER — HOSPITAL ENCOUNTER (OUTPATIENT)
Dept: MAMMOGRAPHY | Age: 46
Discharge: HOME | End: 2021-09-20
Payer: MEDICAID

## 2021-09-20 DIAGNOSIS — Z12.31 VISIT FOR SCREENING MAMMOGRAM: ICD-10-CM

## 2021-09-20 PROCEDURE — 77067 SCR MAMMO BI INCL CAD: CPT

## 2021-09-24 ENCOUNTER — OFFICE VISIT (OUTPATIENT)
Dept: FAMILY MEDICINE CLINIC | Age: 46
End: 2021-09-24
Payer: MEDICAID

## 2021-09-24 VITALS
DIASTOLIC BLOOD PRESSURE: 84 MMHG | OXYGEN SATURATION: 98 % | WEIGHT: 235 LBS | HEART RATE: 80 BPM | SYSTOLIC BLOOD PRESSURE: 152 MMHG | BODY MASS INDEX: 45.9 KG/M2

## 2021-09-24 DIAGNOSIS — I10 ESSENTIAL HYPERTENSION: ICD-10-CM

## 2021-09-24 DIAGNOSIS — E66.01 MORBID OBESITY (HCC): ICD-10-CM

## 2021-09-24 DIAGNOSIS — F51.01 PRIMARY INSOMNIA: Primary | ICD-10-CM

## 2021-09-24 PROCEDURE — 99214 OFFICE O/P EST MOD 30 MIN: CPT | Performed by: STUDENT IN AN ORGANIZED HEALTH CARE EDUCATION/TRAINING PROGRAM

## 2021-09-24 RX ORDER — ZOLPIDEM TARTRATE 5 MG/1
5 TABLET ORAL
Qty: 30 TABLET | Refills: 0 | Status: SHIPPED | OUTPATIENT
Start: 2021-09-24

## 2021-09-24 NOTE — PROGRESS NOTES
Subjective:   Dino Ugarte is a 42 y.o. female who was seen for Establish Care (establish care was [place] patient ) and Sleep Problem (having trouble sleeping for about 5 months now says she has been out of her Ambien for a few months now )    Patient has been having trouble sleeping. Used to be on [place] which helped but has been out of it for a while. She was supposed to have a sleep study done a few years ago but did not have it done because she was sick. Her snoring has decreased ever since her gastric bypass surgery was done. She has lost 70 pounds. Home Medications    Medication Sig Start Date End Date Taking? Authorizing Provider   zolpidem (AMBIEN) 5 mg tablet Take 1 Tablet by mouth nightly as needed for Sleep. Max Daily Amount: 5 mg. 9/24/21  Yes Ken Acosta MD   ferrous sulfate 325 mg (65 mg iron) tablet Take 325 mg by mouth daily. Yes Other, MD Ole   Biotin 2,500 mcg cap Take  by mouth. Yes Provider, Historical   cholecalciferol (VITAMIN D3) (5000 Units/125 mcg) tab tablet Take 5,000 Units by mouth daily. Yes Provider, Historical   thiamine HCL (Vitamin B-1) 100 mg tablet Take 100 mg by mouth daily. Yes Provider, Historical   cyanocobalamin 1,000 mcg tablet Take 1,000 mcg by mouth daily. Yes Provider, Historical   calcium citrate 200 mg (950 mg) tablet Take 500 mg by mouth three (3) times daily. Yes Provider, Historical   multivitamin capsule Take 1 Cap by mouth two (2) times a day. [place] vitamin    Yes Provider, Historical      Allergies   Allergen Reactions    Lisinopril Other (comments)     Swollen lips     Social History     Tobacco Use    Smoking status: Never Smoker    Smokeless tobacco: Never Used   Vaping Use    Vaping Use: Never used   Substance Use Topics    Alcohol use: Not Currently    Drug use: Not Currently            Review of Systems   Psychiatric/Behavioral: Positive for sleep disturbance.           Objective:     Visit Vitals  BP (!) 152/84   Pulse 80   Wt 235 lb (106.6 kg)   LMP 07/16/2020   SpO2 98%   BMI 45.90 kg/m²        General: alert, oriented, not in distress  Head: scalp normal, atraumatic  Chest/Lungs: clear breath sounds, no wheezing or crackles  Heart: normal rate, regular rhythm, no murmur  Abdomen: soft, non-distended, non-tender, normal bowel sounds, no organomegaly, no masses  Extremities: no focal deformities, no edema  Skin: no active skin lesions    Laboratory/Tests:  Hospital Outpatient Visit on 09/03/2021   Component Date Value Ref Range Status    WBC 09/03/2021 9.1  4.6 - 13.2 K/uL Final    RBC 09/03/2021 3.76* 4.20 - 5.30 M/uL Final    HGB 09/03/2021 11.5* 12.0 - 16.0 g/dL Final    HCT 09/03/2021 35.8  35.0 - 45.0 % Final    MCV 09/03/2021 95.2  78.0 - 100.0 FL Final    PLEASE NOTE NEW REFERENCE RANGE    MCH 09/03/2021 30.6  24.0 - 34.0 PG Final    MCHC 09/03/2021 32.1  31.0 - 37.0 g/dL Final    RDW 09/03/2021 12.9  11.6 - 14.5 % Final    PLATELET 87/52/6531 786  135 - 420 K/uL Final    MPV 09/03/2021 10.4  9.2 - 11.8 FL Final    NRBC 09/03/2021 0.0  0.0  WBC Final    ABSOLUTE NRBC 09/03/2021 0.00  0.00 - 0.01 K/uL Final    NEUTROPHILS 09/03/2021 56  40 - 73 % Final    LYMPHOCYTES 09/03/2021 34  21 - 52 % Final    MONOCYTES 09/03/2021 6  3 - 10 % Final    EOSINOPHILS 09/03/2021 3  0 - 5 % Final    BASOPHILS 09/03/2021 1  0 - 2 % Final    IMMATURE GRANULOCYTES 09/03/2021 0  0 - 0.5 % Final    ABS. NEUTROPHILS 09/03/2021 5.2  1.8 - 8.0 K/UL Final    ABS. LYMPHOCYTES 09/03/2021 3.1  0.9 - 3.6 K/UL Final    ABS. MONOCYTES 09/03/2021 0.6  0.05 - 1.2 K/UL Final    ABS. EOSINOPHILS 09/03/2021 0.2  0.0 - 0.4 K/UL Final    ABS. BASOPHILS 09/03/2021 0.1  0.0 - 0.1 K/UL Final    ABS. IMM.  GRANS. 09/03/2021 0.0  0.00 - 0.04 K/UL Final    DF 09/03/2021 AUTOMATED    Final    Sodium 09/03/2021 138  135 - 145 mmol/L Final    Potassium 09/03/2021 3.8  3.2 - 5.1 mmol/L Final    Chloride 09/03/2021 103  94 - 111 mmol/L Final    CO2 09/03/2021 27  21 - 33 mmol/L Final    Anion gap 09/03/2021 8  mmol/L Final    Glucose 09/03/2021 84  70 - 110 mg/dL Final    BUN 09/03/2021 7* 9 - 21 mg/dL Final    Creatinine 09/03/2021 0.50* 0.70 - 1.20 mg/dL Final    BUN/Creatinine ratio 09/03/2021 14    Final    GFR est AA 09/03/2021 >60  ml/min/1.73m2 Final    GFR est non-AA 09/03/2021 >60  ml/min/1.73m2 Final    Comment: Estimated GFR is calculated using the IDMS-traceable Modification of Diet in Renal Disease (MDRD) Study equation, reported for both  Americans (GFRAA) and non- Americans (GFRNA), and normalized to 1.73m2 body surface area. The physician must decide which value applies to the patient. The MDRD study equation should only be used in individuals age 25 or older. It has not been validated for the following: pregnant women, patients with serious comorbid conditions, or on certain medications, or persons with extremes of body size, muscle mass, or nutritional status.  Calcium 09/03/2021 9.2  8.5 - 10.5 mg/dL Final    Bilirubin, total 09/03/2021 0.6  0.2 - 1.0 mg/dL Final    AST (SGOT) 09/03/2021 12* 14 - 74 U/L Final    ALT (SGPT) 09/03/2021 14  3 - 52 U/L Final    Alk. phosphatase 09/03/2021 25* 38 - 126 U/L Final    Protein, total 09/03/2021 7.2  6.1 - 8.4 g/dL Final    Albumin 09/03/2021 3.6  3.5 - 4.7 g/dL Final    Globulin 09/03/2021 3.6  g/dL Final    A-G Ratio 09/03/2021 1.0    Final    TSH 09/03/2021 1.72  0.35 - 6.20 uIU/mL Final    Comment:    Due to TSH heterogeneity, both structurally and degree of glycosylation, monoclonal antibodies used in the TSH assay may not accurately quantitate TSH. Therefore, this result should be correlated with clinical findings as well as with other assessments of thyroid function, e.g., free T4, free T3.        Vitamin B12 09/03/2021 932* 211 - 911 pg/mL Final    Folate 09/03/2021 18.6* 3.10 - 17.50 ng/mL Final    Ferritin 09/03/2021 63  8 - 388 ng/mL Final    Hemoglobin A1c 09/03/2021 5.4  4.2 - 5.6 % Final    Comment: (NOTE)  HbA1C Interpretive Ranges  <5.7              Normal  5.7 - 6.4         Consider Prediabetes  >6.5              Consider Diabetes      Est. average glucose 09/03/2021 108  mg/dL Final    Comment: (NOTE)  The eAG should be interpreted with patient characteristics in mind   since ethnicity, interindividual differences, red cell lifespan,   variation in rates of glycation, etc. may affect the validity of the   calculation.  Vitamin D 25-Hydroxy 09/03/2021 40.8  30 - 100 ng/mL Final    Comment: (NOTE)  Deficiency               <20 ng/mL  Insufficiency          20-30 ng/mL  Sufficient             ng/mL  Possible toxicity       >100 ng/mL    The Method used is Siemens Advia Centaur currently standardized to a   Center of Disease Control and Prevention (CDC) certified reference   [address]. Samples containing fluorescein dye can produce falsely   elevated values when tested with the ADVIA Centaur Vitamin D Assay. It is recommended that results in the toxic range, >100 ng/mL, be   retested 72 hours post fluorescein exposure.      Hospital Outpatient Visit on 05/18/2021   Component Date Value Ref Range Status    Albumin 05/18/2021 3.4* 3.5 - 4.7 g/dL Final    WBC 05/18/2021 8.6  4.6 - 13.2 K/uL Final    RBC 05/18/2021 3.93* 4.20 - 5.30 M/uL Final    HGB 05/18/2021 11.5* 12.0 - 16.0 g/dL Final    HCT 05/18/2021 36.5  35.0 - 45.0 % Final    MCV 05/18/2021 92.9  74.0 - 97.0 FL Final    MCH 05/18/2021 29.3  24.0 - 34.0 PG Final    MCHC 05/18/2021 31.5  31.0 - 37.0 g/dL Final    RDW 05/18/2021 14.0  11.6 - 14.5 % Final    PLATELET 40/09/6791 458  135 - 420 K/uL Final    MPV 05/18/2021 10.5  9.2 - 11.8 FL Final    NEUTROPHILS 05/18/2021 55  40 - 73 % Final    LYMPHOCYTES 05/18/2021 36  21 - 52 % Final    MONOCYTES 05/18/2021 8  3 - 10 % Final    EOSINOPHILS 05/18/2021 1  0 - 5 % Final    BASOPHILS 05/18/2021 0  0 - 2 % Final    IMMATURE GRANULOCYTES 05/18/2021 0  % Final    ABS. NEUTROPHILS 05/18/2021 4.8  1.8 - 8.0 K/UL Final    ABS. LYMPHOCYTES 05/18/2021 3.1  0.9 - 3.6 K/UL Final    ABS. MONOCYTES 05/18/2021 0.7  0.05 - 1.2 K/UL Final    ABS. EOSINOPHILS 05/18/2021 0.1  0.0 - 0.4 K/UL Final    ABS. BASOPHILS 05/18/2021 0.0  0.0 - 0.1 K/UL Final    ABS. IMM. GRANS. 05/18/2021 0.0  K/UL Final    Ferritin 05/18/2021 26  26 - 388 ng/mL Final    Iron 05/18/2021 116  35 - 150 ug/dL Final    Sodium 05/18/2021 137  135 - 145 mmol/L Final    Potassium 05/18/2021 3.5  3.2 - 5.1 mmol/L Final    Chloride 05/18/2021 103  94 - 111 mmol/L Final    CO2 05/18/2021 25  21 - 33 mmol/L Final    Anion gap 05/18/2021 9  mmol/L Final    Glucose 05/18/2021 87  70 - 110 mg/dL Final    BUN 05/18/2021 8* 9 - 21 mg/dL Final    Creatinine 05/18/2021 0.60* 0.70 - 1.20 mg/dL Final    BUN/Creatinine ratio 05/18/2021 13    Final    GFR est AA 05/18/2021 >60  ml/min/1.73m2 Final    GFR est non-AA 05/18/2021 >60  ml/min/1.73m2 Final    Comment: Estimated GFR is calculated using the IDMS-traceable Modification of Diet in Renal Disease (MDRD) Study equation, reported for both  Americans (GFRAA) and non- Americans (GFRNA), and normalized to 1.73m2 body surface area. The physician must decide which value applies to the patient. The MDRD study equation should only be used in individuals age 25 or older. It has not been validated for the following: pregnant women, patients with serious comorbid conditions, or on certain medications, or persons with extremes of body size, muscle mass, or nutritional status.  Calcium 05/18/2021 9.0  8.5 - 10.5 mg/dL Final    Vitamin B1 05/18/2021 250.2* 66.5 - 200.0 nmol/L Final    Comment: This test was developed and its performance characteristics  determined by Iain Padron. It has not been cleared or approved  by the Food and Drug Administration.   Performed At: [facility]  [address] [place], [place] [id number]  Ashley Poster MD [id number]      Vitamin B12 05/18/2021 956  193 - 986 pg/mL Final    Folate 05/18/2021 14.3  5.0 - 21.0 ng/mL Final    Vitamin D 25-Hydroxy 05/18/2021 49.7  30 - 100 ng/mL Final    Comment: (NOTE)  Deficiency               <20 ng/mL  Insufficiency          20-30 ng/mL  Sufficient             ng/mL  Possible toxicity       >100 ng/mL    The Method used is Siemens Advia Centaur currently standardized to a   Center of Disease Control and Prevention (CDC) certified reference   [address] [facility]. Samples containing fluorescein dye can produce falsely   elevated values when tested with the ADVIA Centaur Vitamin D Assay. It is recommended that results in the toxic range, >100 ng/mL, be   retested 72 hours post fluorescein exposure. Orders Only on 02/27/2021   Component Date Value Ref Range Status    WBC 02/27/2021 10.5  3.4 - 10.8 x10E3/uL Final    RBC 02/27/2021 4.21  3.77 - 5.28 x10E6/uL Final    HGB 02/27/2021 12.1  11.1 - 15.9 g/dL Final    HCT 02/27/2021 35.5  34.0 - 46.6 % Final    MCV 02/27/2021 84  79 - 97 fL Final    MCH 02/27/2021 28.7  26.6 - 33.0 pg Final    MCHC 02/27/2021 34.1  31.5 - 35.7 g/dL Final    RDW 02/27/2021 15.7* 11.7 - 15.4 % Final    PLATELET 30/75/2973 616  150 - 450 x10E3/uL Final    Vitamin B1 02/27/2021 CANCELED  nmol/L Final-Edited    Comment: Test not performed. No frozen whole blood received. Result canceled by the ancillary.  Albumin 02/27/2021 3.8  3.8 - 4.8 g/dL Final    Iron 02/27/2021 38  27 - 159 ug/dL Final    SPECIMEN STATUS REPORT 02/27/2021 COMMENT   Final    Comment: Please note  Please note  The date and/or time of collection was not indicated on the  requisition as required by state and federal law. The date of  receipt of the specimen was used as the collection date if not  supplied.      Admission on 11/30/2020, Discharged on 12/01/2020   Component Date Value Ref Range Status    HCG urine, QL 11/30/2020 Negative  NEG   Final    Test results should be confirmed using serum quantitative hCG when detection of pregnancy is critical and before performing any critical medical procedure.  BENZODIAZEPINES 11/30/2020 Negative  NEG   Final    BARBITURATES 11/30/2020 Negative  NEG   Final    THC (TH-CANNABINOL) 11/30/2020 Negative  NEG   Final    OPIATES 11/30/2020 Negative  NEG   Final    PCP(PHENCYCLIDINE) 11/30/2020 Negative  NEG   Final    COCAINE 11/30/2020 Negative  NEG   Final    AMPHETAMINES 11/30/2020 Negative  NEG   Final    METHADONE 11/30/2020 Negative  NEG   Final    HDSCOM 11/30/2020 (NOTE)   Final    Comment: Specimen analysis was performed without chain of custody handling. These results should be used for medical purposes only and not for   legal or employment purposes. Unconfirmed screening results must not   be used for non-medical purposes.     The cut-off concentration for positive results are as follows:    AMPH     1000 ng/mL  COTY      200 ng/mL  DAVID      200 ng/mL  DEBBY       300 ng/mL  METH      300 ng/mL  OPI       300 ng/mL  PCP        25 ng/mL  THC        50 ng/mL        WBC 12/01/2020 14.4* 4.6 - 13.2 K/uL Final    RBC 12/01/2020 4.12* 4.20 - 5.30 M/uL Final    HGB 12/01/2020 11.4* 12.0 - 16.0 g/dL Final    HCT 12/01/2020 35.3  35.0 - 45.0 % Final    MCV 12/01/2020 85.7  74.0 - 97.0 FL Final    MCH 12/01/2020 27.7  24.0 - 34.0 PG Final    MCHC 12/01/2020 32.3  31.0 - 37.0 g/dL Final    RDW 12/01/2020 14.3  11.6 - 14.5 % Final    PLATELET 34/12/2974 202  135 - 420 K/uL Final    MPV 12/01/2020 9.2  9.2 - 11.8 FL Final    Sodium 12/01/2020 138  136 - 145 mmol/L Final    Potassium 12/01/2020 3.7  3.5 - 5.5 mmol/L Final    Chloride 12/01/2020 107  100 - 111 mmol/L Final    CO2 12/01/2020 26  21 - 32 mmol/L Final    Anion gap 12/01/2020 5  3.0 - 18 mmol/L Final    Glucose 12/01/2020 97  74 - 99 mg/dL Final    BUN 12/01/2020 3* 7.0 - 18 MG/DL Final    Creatinine 12/01/2020 0.50* 0.6 - 1.3 MG/DL Final    BUN/Creatinine ratio 12/01/2020 6* 12 - 20   Final    GFR est AA 12/01/2020 >60  >60 ml/min/1.73m2 Final    GFR est non-AA 12/01/2020 >60  >60 ml/min/1.73m2 Final    Comment: (NOTE)  Estimated GFR is calculated using the Modification of Diet in Renal   Disease (MDRD) Study equation, reported for both  Americans   (GFRAA) and non- Americans (GFRNA), and normalized to 1.73m2   body surface area. The physician must decide which value applies to   the patient. The MDRD study equation should only be used in   individuals age 25 or older. It has not been validated for the   following: pregnant women, patients with serious comorbid conditions,   or on certain medications, or persons with extremes of body size,   muscle mass, or nutritional status.  Calcium 12/01/2020 8.8  8.5 - 10.1 MG/DL Final    Magnesium 12/01/2020 2.0  1.6 - 2.6 mg/dL Final   Hospital Outpatient Visit on 11/25/2020   Component Date Value Ref Range Status    SARS-CoV-2 11/25/2020 Not Detected  Not Detected   Final    Comment: (NOTE)  This nucleic acid amplification test was developed and its  performance characteristics determined by [employer]. Nucleic acid amplification tests include PCR and TMA. This test has  not been FDA cleared or approved. This test has been authorized by  FDA under an Emergency Use Authorization (EUA). This test is only  authorized for the duration of time the declaration that  circumstances exist justifying the authorization of the emergency use  of in vitro diagnostic tests for detection of SARS-CoV-2 virus and/or  diagnosis of COVID-19 infection under section 564(b)(1) of the Act,  21 U. S.C. 917ZHZ-8(T) (1), unless the authorization is terminated or  revoked sooner.   When diagnostic testing is negative, the possibility of a false  negative result should be considered in the context of a patient's  recent exposures and the presence of clinical signs and symptoms  consistent with COVID-19. An individual without symptoms of COVID-  19 and who is not shedding SARS-CoV-2 vi                           meet would expect to have a  negative (not detected) result in this assay. Performed At: [address] [id number]  Sabra Isaacs MD KA:[id number]     Orders Only on 10/20/2020   Component Date Value Ref Range Status    WBC 10/22/2020 11.3* 3.4 - 10.8 x10E3/uL Final    RBC 10/22/2020 4.16  3.77 - 5.28 x10E6/uL Final    HGB 10/22/2020 11.1  11.1 - 15.9 g/dL Final    HCT 10/22/2020 35.0  34.0 - 46.6 % Final    MCV 10/22/2020 84  79 - 97 fL Final    MCH 10/22/2020 26.7  26.6 - 33.0 pg Final    MCHC 10/22/2020 31.7  31.5 - 35.7 g/dL Final    RDW 10/22/2020 14.9  11.7 - 15.4 % Final    PLATELET 76/67/4752 790  150 - 450 x10E3/uL Final    NEUTROPHILS 10/22/2020 59  Not Estab. % Final    Lymphocytes 10/22/2020 31  Not Estab. % Final    MONOCYTES 10/22/2020 8  Not Estab. % Final    EOSINOPHILS 10/22/2020 1  Not Estab. % Final    BASOPHILS 10/22/2020 1  Not Estab. % Final    ABS. NEUTROPHILS 10/22/2020 6.8  1.4 - 7.0 x10E3/uL Final    Abs Lymphocytes 10/22/2020 3.5* 0.7 - 3.1 x10E3/uL Final    ABS. MONOCYTES 10/22/2020 0.9  0.1 - 0.9 x10E3/uL Final    ABS. EOSINOPHILS 10/22/2020 0.1  0.0 - 0.4 x10E3/uL Final    ABS. BASOPHILS 10/22/2020 0.1  0.0 - 0.2 x10E3/uL Final    IMMATURE GRANULOCYTES 10/22/2020 0  Not Estab. % Final    ABS. IMM. GRANS.  10/22/2020 0.0  0.0 - 0.1 x10E3/uL Final    Glucose 10/22/2020 89  65 - 99 mg/dL Final    BUN 10/22/2020 9  6 - 24 mg/dL Final    Creatinine 10/22/2020 0.60  0.57 - 1.00 mg/dL Final    GFR est non-AA 10/22/2020 112  >59 mL/min/1.73 Final    GFR est AA 10/22/2020 129  >59 mL/min/1.73 Final    BUN/Creatinine ratio 10/22/2020 15  9 - 23 Final    Sodium 10/22/2020 142  134 - 144 mmol/L Final    Potassium 10/22/2020 4.3  3.5 - 5.2 mmol/L Final    Chloride 10/22/2020 104  96 - 106 mmol/L Final    CO2 10/22/2020 21  20 - 29 mmol/L Final    Calcium 10/22/2020 9.1  8.7 - 10.2 mg/dL Final    Protein, total 10/22/2020 7.5  6.0 - 8.5 g/dL Final    Albumin 10/22/2020 4.2  3.8 - 4.8 g/dL Final    GLOBULIN, TOTAL 10/22/2020 3.3  1.5 - 4.5 g/dL Final    A-G Ratio 10/22/2020 1.3  1.2 - 2.2 Final    Bilirubin, total 10/22/2020 <0.2  0.0 - 1.2 mg/dL Final    Alk. phosphatase 10/22/2020 35* 39 - 117 IU/L Final    AST (SGOT) 10/22/2020 12  0 - 40 IU/L Final    ALT (SGPT) 10/22/2020 8  0 - 32 IU/L Final    Cholesterol, total 10/22/2020 221* 100 - 199 mg/dL Final    Triglyceride 10/22/2020 117  0 - 149 mg/dL Final    HDL Cholesterol 10/22/2020 82  >39 mg/dL Final    VLDL, calculated 10/22/2020 20  5 - 40 mg/dL Final    LDL, calculated 10/22/2020 119* 0 - 99 mg/dL Final    Vitamin B12 10/22/2020 519  232 - 1,245 pg/mL Final    Folate 10/22/2020 7.9  >3.0 ng/mL Final    Comment: A serum folate concentration of less than 3.1 ng/mL is  considered to represent clinical deficiency.  Vitamin B1 10/22/2020 123.1  66.5 - 200.0 nmol/L Final    VITAMIN D, 25-HYDROXY 10/22/2020 20.0* 30.0 - 100.0 ng/mL Final    Comment: Vitamin D deficiency has been defined by the [address] practice guideline as a  level of serum 25-OH vitamin D less than 20 ng/mL (1,2). The Endocrine Society went on to further define vitamin D  insufficiency as a level between 21 and 29 ng/mL (2). 1. IOM ([place] of Medicine). 2010. Dietary reference     intakes for calcium and D. [facility]: The     whodoyou. 2. Comfort MF, Kalpesh BURR, Dustin HOLDER, et al.     Evaluation, treatment, and prevention of vitamin D     deficiency: an Endocrine Society clinical practice     guideline. JCEM. 2011 Jul; 96(7):1911-30.       TSH 10/22/2020 2.550  0.450 - 4.500 uIU/mL Final    H pylori Breath Test 10/22/2020 CANCELED   Final-Edited    Result canceled by the ancillary.  Iron 10/22/2020 48  27 - 159 ug/dL Final    SPECIMEN STATUS REPORT 10/22/2020 COMMENT   Final    Comment: Please note  Please note  The date and/or time of collection was not indicated on the  requisition as required by state and federal law. The date of  receipt of the specimen was used as the collection date if not  supplied.  WBC 10/30/2020 10.2  3.4 - 10.8 x10E3/uL Final    RBC 10/30/2020 4.01  3.77 - 5.28 x10E6/uL Final    HGB 10/30/2020 10.9* 11.1 - 15.9 g/dL Final    HCT 10/30/2020 34.5  34.0 - 46.6 % Final    MCV 10/30/2020 86  79 - 97 fL Final    MCH 10/30/2020 27.2  26.6 - 33.0 pg Final    MCHC 10/30/2020 31.6  31.5 - 35.7 g/dL Final    RDW 10/30/2020 14.8  11.7 - 15.4 % Final    PLATELET 36/46/1184 518  150 - 450 x10E3/uL Final    NEUTROPHILS 10/30/2020 57  Not Estab. % Final    Lymphocytes 10/30/2020 34  Not Estab. % Final    MONOCYTES 10/30/2020 7  Not Estab. % Final    EOSINOPHILS 10/30/2020 1  Not Estab. % Final    BASOPHILS 10/30/2020 1  Not Estab. % Final    ABS. NEUTROPHILS 10/30/2020 5.9  1.4 - 7.0 x10E3/uL Final    Abs Lymphocytes 10/30/2020 3.4* 0.7 - 3.1 x10E3/uL Final    ABS. MONOCYTES 10/30/2020 0.7  0.1 - 0.9 x10E3/uL Final    ABS. EOSINOPHILS 10/30/2020 0.1  0.0 - 0.4 x10E3/uL Final    ABS. BASOPHILS 10/30/2020 0.1  0.0 - 0.2 x10E3/uL Final    IMMATURE GRANULOCYTES 10/30/2020 0  Not Estab. % Final    ABS. IMM. GRANS.  10/30/2020 0.0  0.0 - 0.1 x10E3/uL Final    Glucose 10/30/2020 88  65 - 99 mg/dL Final    BUN 10/30/2020 8  6 - 24 mg/dL Final    Creatinine 10/30/2020 0.53* 0.57 - 1.00 mg/dL Final    GFR est non-AA 10/30/2020 117  >59 mL/min/1.73 Final    GFR est AA 10/30/2020 134  >59 mL/min/1.73 Final    BUN/Creatinine ratio 10/30/2020 15  9 - 23 Final    Sodium 10/30/2020 139  134 - 144 mmol/L Final    Potassium 10/30/2020 4.1  3.5 - 5.2 mmol/L Final    Chloride 10/30/2020 104  96 - 106 mmol/L Final    CO2 10/30/2020 22 20 - 29 mmol/L Final    Calcium 10/30/2020 9.1  8.7 - 10.2 mg/dL Final    Protein, total 10/30/2020 7.0  6.0 - 8.5 g/dL Final    Albumin 10/30/2020 4.0  3.8 - 4.8 g/dL Final    GLOBULIN, TOTAL 10/30/2020 3.0  1.5 - 4.5 g/dL Final    A-G Ratio 10/30/2020 1.3  1.2 - 2.2 Final    Bilirubin, total 10/30/2020 <0.2  0.0 - 1.2 mg/dL Final    Alk. phosphatase 10/30/2020 43  39 - 117 IU/L Final    AST (SGOT) 10/30/2020 12  0 - 40 IU/L Final    ALT (SGPT) 10/30/2020 10  0 - 32 IU/L Final    Cholesterol, total 10/30/2020 227* 100 - 199 mg/dL Final    Triglyceride 10/30/2020 151* 0 - 149 mg/dL Final    HDL Cholesterol 10/30/2020 78  >39 mg/dL Final    VLDL, calculated 10/30/2020 26  5 - 40 mg/dL Final    LDL, calculated 10/30/2020 123* 0 - 99 mg/dL Final    Vitamin B12 10/30/2020 581  232 - 1,245 pg/mL Final    Folate 10/30/2020 11.0  >3.0 ng/mL Final    Comment: A serum folate concentration of less than 3.1 ng/mL is  considered to represent clinical deficiency.  Vitamin B1 10/30/2020 CANCELED  nmol/L Final-Edited    Comment: Test not performed. No frozen whole blood received. Result canceled by the ancillary.  VITAMIN D, 25-HYDROXY 10/30/2020 16.0* 30.0 - 100.0 ng/mL Final    Comment: Vitamin D deficiency has been defined by the [address] practice guideline as a  level of serum 25-OH vitamin D less than 20 ng/mL (1,2). The Endocrine Society went on to further define vitamin D  insufficiency as a level between 21 and 29 ng/mL (2). 1. IOM ([employer] [employer]). 2010. Dietary reference     intakes for calcium and D. [facility]: The     [employer] [facility]. 2. Comfort MF, Kalpesh BURR, Dustin HOLDER, et al.     Evaluation, treatment, and prevention of vitamin D     deficiency: an Endocrine Society clinical practice     guideline. JCEM. 2011 Jul; 96(7):1911-30.  TSH 10/30/2020 1.430  0.450 - 4.500 uIU/mL Final    H. Pylori Ab,IgM 10/30/2020 <9.0 0.0 - 8.9 units Final    Comment:                                 Negative          <9.0                                  Equivocal   9.0 - 11.0                                  Positive         >11.0  This test was developed and its performance characteristics  determined by [facility]. It has not been cleared or approved  by the Food and Drug Administration.  H. pylori Ab, IgA 10/30/2020 <9.0  0.0 - 8.9 units Final    Comment:                                 Negative          <9.0                                  Equivocal   9.0 - 11.0                                  Positive         >11.0      Iron 10/30/2020 27  27 - 159 ug/dL Final    SPECIMEN STATUS REPORT 10/30/2020 COMMENT   Final    Comment: Please note  Please note  The date and/or time of collection was not indicated on the  requisition as required by state and federal law. The date of  receipt of the specimen was used as the collection date if not  supplied.  Vitamin B1 10/31/2020 107.6  66.5 - 200.0 nmol/L Final    SPECIMEN STATUS REPORT 10/31/2020 COMMENT   Final    Comment: Please note  Please note  The date and/or time of collection was not indicated on the  requisition as required by state and federal law. The date of  receipt of the specimen was used as the collection date if not  supplied. Abstract on 10/07/2020   Component Date Value Ref Range Status    SARS-CoV-2, RAMIREZ 08/25/2020 Not Detected   Final         Assessment & Plan:     1. Primary insomnia  Will start ambien. Will obtain sleep study  - zolpidem (AMBIEN) 5 mg tablet; Take 1 Tablet by mouth nightly as needed for Sleep. Max Daily Amount: 5 mg. Dispense: 30 Tablet; Refill: 0  - SLEEP MEDICINE REFERRAL    2. Morbid obesity ([facility] [address]  Obtain sleep study for insomnia  - SLEEP MEDICINE REFERRAL    3. Essential hypertension  Elevated. Was previously on BP medications. Will continue to monitor. Recheck BP in 1 month.           I have discussed the diagnosis with the patient and the intended plan as seen in the above orders. The patient has received an after-visit summary and questions were answered concerning future plans. I have discussed medication side effects and warnings with the patient as well. I have reviewed the plan of care with the patient, accepted their input and they are in agreement with the treatment goals. Previous lab and imaging results were reviewed by me.        Hannah Krause MD  September 24, 2021

## 2021-09-24 NOTE — PROGRESS NOTES
Nicholas Currie presents today for   Chief Complaint   Patient presents with   [facility] Establish Care     establish care was [place] patient     Sleep Problem     having trouble sleeping for about 5 months now says she has been out of her Ambien for a few months now        Is someone accompanying this pt? no    Is the patient using any DME equipment during OV? no    Depression Screening:  3 most recent PHQ Screens 9/24/2021   Little interest or pleasure in doing things Not at all   Feeling down, depressed, irritable, or hopeless Not at all   Total Score PHQ 2 0       Learning Assessment:  Learning Assessment 9/1/2020   PRIMARY LEARNER Patient   HIGHEST LEVEL OF EDUCATION - PRIMARY LEARNER  DID NOT GRADUATE [facility] PRIMARY LEARNER [address] CAREGIVER No   PRIMARY LANGUAGE ENGLISH   LEARNER PREFERENCE PRIMARY LISTENING   ANSWERED BY Billy Rascon   RELATIONSHIP SELF       Fall Risk  Fall Risk Assessment, last 12 mths 3/1/2021   Able to walk? Yes   Fall in past 12 months? 0   Do you feel unsteady?  0   Are you worried about falling 0       ADL  ADL Assessment 12/8/2020   Feeding yourself No Help Needed   Getting from bed to chair No Help Needed   Getting dressed No Help Needed   Bathing or showering No Help Needed   Walk across the room (includes cane/walker) No Help Needed   Using the telphone No Help Needed   Taking your medications No Help Needed   Preparing meals No Help Needed   Managing money (expenses/bills) No Help Needed   Moderately strenuous housework (laundry) No Help Needed   Shopping for personal items (toiletries/medicines) No Help Needed   Shopping for groceries No Help Needed   Driving No Help Needed   Climbing a flight of stairs No Help Needed   Getting to places beyond walking distances No Help Needed       Travel Screening:    Travel Screening     Question   Response    In the last month, have you been in contact with someone who was confirmed or suspected to have Stephan Tian / [id number]? No / Unsure    Have you had a COVID-19 viral test in the last 14 days? No    Do you have any of the following new or worsening symptoms? None of these    Have you traveled internationally or domestically in the last month? No      Travel History   Travel since 08/24/21     No documented travel since 08/24/21          Health Maintenance reviewed and discussed and ordered per Provider. Health Maintenance Due   Topic Date Due    Hepatitis C Screening  Never done    COVID-19 Vaccine (1) Never done    DTaP/Tdap/Td series (1 - Tdap) Never done    Flu Vaccine (1) Never done   . Coordination of Care:  1. Have you been to the ER, urgent care clinic since your last visit? Hospitalized since your last visit? no    2. Have you seen or consulted any other health care providers outside of the [address] [place] [address] since your last visit? Include any pap smears or colon screening.  yes

## 2021-09-25 ENCOUNTER — HOSPITAL ENCOUNTER (OUTPATIENT)
Dept: LAB | Age: 46
Discharge: HOME | End: 2021-09-25
Payer: MEDICAID

## 2021-09-25 DIAGNOSIS — Z98.84 S/P GASTRIC BYPASS: ICD-10-CM

## 2021-09-25 DIAGNOSIS — K91.2 POSTOPERATIVE INTESTINAL MALABSORPTION: ICD-10-CM

## 2021-09-25 DIAGNOSIS — E66.01 OBESITY, MORBID (HCC): ICD-10-CM

## 2021-09-25 DIAGNOSIS — Z98.84 BARIATRIC SURGERY STATUS: ICD-10-CM

## 2021-09-25 DIAGNOSIS — I10 ESSENTIAL HYPERTENSION: ICD-10-CM

## 2021-09-25 DIAGNOSIS — K91.2 POSTOPERATIVE MALABSORPTION: ICD-10-CM

## 2021-09-25 PROCEDURE — 80061 LIPID PANEL: CPT

## 2021-09-25 PROCEDURE — 36415 COLL VENOUS BLD VENIPUNCTURE: CPT

## 2021-09-26 LAB
CHOLEST SERPL-MCNC: 210 MG/DL
HDLC SERPL-MCNC: 99 MG/DL (ref 40–60)
HDLC SERPL: 2.1 {RATIO} (ref 0–5)
LDLC SERPL CALC-MCNC: 88 MG/DL (ref 0–100)
LIPID PROFILE,FLP: ABNORMAL
TRIGL SERPL-MCNC: 115 MG/DL (ref ?–150)
VLDLC SERPL CALC-MCNC: 23 MG/DL

## 2021-09-28 ENCOUNTER — TELEPHONE (OUTPATIENT)
Dept: SURGERY | Age: 46
End: 2021-09-28

## 2021-09-28 DIAGNOSIS — K91.2 POSTOPERATIVE MALABSORPTION: Primary | ICD-10-CM

## 2021-09-28 NOTE — TELEPHONE ENCOUNTER
Returned pt call complaints of some nausea dizziness she just saw pcp recently and having issues with blood pressure instructed to contact her pcp now regarding her symptoms and also  instructed to schedule appt with our dietician she did not have 9 month visit. Reminded her to be sure she  is getting her fluids her protein and adhering to her diet and also that she is cont to measure her foods to avoid over -eating.   Call back for further appt and reminded to do labs before her one year visit which is scheduled in dec

## 2021-09-29 ENCOUNTER — APPOINTMENT (OUTPATIENT)
Dept: GENERAL RADIOLOGY | Age: 46
End: 2021-09-29
Attending: EMERGENCY MEDICINE
Payer: MEDICAID

## 2021-09-29 ENCOUNTER — HOSPITAL ENCOUNTER (EMERGENCY)
Age: 46
Discharge: HOME | End: 2021-09-29
Attending: EMERGENCY MEDICINE
Payer: MEDICAID

## 2021-09-29 VITALS
TEMPERATURE: 98.8 F | OXYGEN SATURATION: 100 % | SYSTOLIC BLOOD PRESSURE: 161 MMHG | HEIGHT: 60 IN | WEIGHT: 235 LBS | RESPIRATION RATE: 17 BRPM | HEART RATE: 70 BPM | BODY MASS INDEX: 46.13 KG/M2 | DIASTOLIC BLOOD PRESSURE: 79 MMHG

## 2021-09-29 DIAGNOSIS — I10 HYPERTENSION, UNSPECIFIED TYPE: ICD-10-CM

## 2021-09-29 DIAGNOSIS — R10.9 FLANK PAIN: ICD-10-CM

## 2021-09-29 DIAGNOSIS — R07.9 CHEST PAIN, UNSPECIFIED TYPE: Primary | ICD-10-CM

## 2021-09-29 LAB
ALBUMIN SERPL-MCNC: 3.7 G/DL (ref 3.5–4.7)
ALBUMIN/GLOB SERPL: 1.2 {RATIO}
ALP SERPL-CCNC: 24 U/L (ref 38–126)
ALT SERPL-CCNC: 12 U/L (ref 3–52)
ANION GAP SERPL CALC-SCNC: 6 MMOL/L
AST SERPL W P-5'-P-CCNC: 13 U/L (ref 14–74)
ATRIAL RATE: 73 BPM
BASOPHILS # BLD: 0.1 K/UL (ref 0–0.1)
BASOPHILS NFR BLD: 1 % (ref 0–2)
BILIRUB DIRECT SERPL-MCNC: 0.1 MG/DL (ref 0–0.3)
BILIRUB SERPL-MCNC: 0.6 MG/DL (ref 0.2–1)
BUN SERPL-MCNC: 9 MG/DL (ref 9–21)
BUN/CREAT SERPL: 18
CA-I BLD-MCNC: 9 MG/DL (ref 8.5–10.5)
CALCULATED P AXIS, ECG09: -22 DEGREES
CALCULATED R AXIS, ECG10: -5 DEGREES
CHLORIDE SERPL-SCNC: 104 MMOL/L (ref 94–111)
CO2 SERPL-SCNC: 25 MMOL/L (ref 21–33)
CREAT SERPL-MCNC: 0.5 MG/DL (ref 0.7–1.2)
DIAGNOSIS, 93000: NORMAL
DIFFERENTIAL METHOD BLD: ABNORMAL
EOSINOPHIL # BLD: 0.1 K/UL (ref 0–0.4)
EOSINOPHIL NFR BLD: 1 % (ref 0–5)
ERYTHROCYTE [DISTWIDTH] IN BLOOD BY AUTOMATED COUNT: 12.1 % (ref 11.6–14.5)
GLOBULIN SER CALC-MCNC: 3.2 G/DL
GLUCOSE SERPL-MCNC: 88 MG/DL (ref 70–110)
HCT VFR BLD AUTO: 35.4 % (ref 35–45)
HGB BLD-MCNC: 11.7 G/DL (ref 12–16)
IMM GRANULOCYTES # BLD AUTO: 0 K/UL (ref 0–0.04)
IMM GRANULOCYTES NFR BLD AUTO: 0 % (ref 0–0.5)
LACTATE SERPL-SCNC: 1 MMOL/L (ref 0.5–2)
LIPASE SERPL-CCNC: 27 U/L (ref 10–57)
LYMPHOCYTES # BLD: 3.2 K/UL (ref 0.9–3.6)
LYMPHOCYTES NFR BLD: 37 % (ref 21–52)
MAGNESIUM SERPL-MCNC: 1.8 MG/DL (ref 1.7–2.8)
MCH RBC QN AUTO: 31.3 PG (ref 24–34)
MCHC RBC AUTO-ENTMCNC: 33.1 G/DL (ref 31–37)
MCV RBC AUTO: 94.7 FL (ref 78–100)
MONOCYTES # BLD: 0.6 K/UL (ref 0.05–1.2)
MONOCYTES NFR BLD: 7 % (ref 3–10)
NEUTS SEG # BLD: 4.7 K/UL (ref 1.8–8)
NEUTS SEG NFR BLD: 54 % (ref 40–73)
NRBC # BLD: 0 K/UL (ref 0–0.01)
NRBC BLD-RTO: 0 PER 100 WBC
P-R INTERVAL, ECG05: 200 MS
PLATELET # BLD AUTO: 300 K/UL (ref 135–420)
PMV BLD AUTO: 9.9 FL (ref 9.2–11.8)
POTASSIUM SERPL-SCNC: 3.5 MMOL/L (ref 3.2–5.1)
PROT SERPL-MCNC: 6.9 G/DL (ref 6.1–8.4)
Q-T INTERVAL, ECG07: 411 MS
QRS DURATION, ECG06: 95 MS
QTC CALCULATION (BEZET), ECG08: 453 MS
RBC # BLD AUTO: 3.74 M/UL (ref 4.2–5.3)
SODIUM SERPL-SCNC: 135 MMOL/L (ref 135–145)
TROPONIN I SERPL-MCNC: <0.02 NG/ML (ref 0.02–0.05)
TROPONIN I SERPL-MCNC: <0.02 NG/ML (ref 0.02–0.05)
VENTRICULAR RATE, ECG03: 73 BPM
WBC # BLD AUTO: 8.5 K/UL (ref 4.6–13.2)

## 2021-09-29 PROCEDURE — 84484 ASSAY OF TROPONIN QUANT: CPT

## 2021-09-29 PROCEDURE — 96375 TX/PRO/DX INJ NEW DRUG ADDON: CPT

## 2021-09-29 PROCEDURE — 83735 ASSAY OF MAGNESIUM: CPT

## 2021-09-29 PROCEDURE — 93005 ELECTROCARDIOGRAM TRACING: CPT

## 2021-09-29 PROCEDURE — 80076 HEPATIC FUNCTION PANEL: CPT

## 2021-09-29 PROCEDURE — 96374 THER/PROPH/DIAG INJ IV PUSH: CPT

## 2021-09-29 PROCEDURE — 99284 EMERGENCY DEPT VISIT MOD MDM: CPT

## 2021-09-29 PROCEDURE — 36415 COLL VENOUS BLD VENIPUNCTURE: CPT

## 2021-09-29 PROCEDURE — 83605 ASSAY OF LACTIC ACID: CPT

## 2021-09-29 PROCEDURE — 80048 BASIC METABOLIC PNL TOTAL CA: CPT

## 2021-09-29 PROCEDURE — 71045 X-RAY EXAM CHEST 1 VIEW: CPT

## 2021-09-29 PROCEDURE — 74011250636 HC RX REV CODE- 250/636: Performed by: EMERGENCY MEDICINE

## 2021-09-29 PROCEDURE — 85025 COMPLETE CBC W/AUTO DIFF WBC: CPT

## 2021-09-29 PROCEDURE — 83690 ASSAY OF LIPASE: CPT

## 2021-09-29 RX ORDER — FAMOTIDINE 10 MG/ML
20 INJECTION INTRAVENOUS
Status: COMPLETED | OUTPATIENT
Start: 2021-09-29 | End: 2021-09-29

## 2021-09-29 RX ORDER — FAMOTIDINE 20 MG/1
20 TABLET, FILM COATED ORAL DAILY
Qty: 10 TABLET | Refills: 0 | Status: SHIPPED | OUTPATIENT
Start: 2021-09-29 | End: 2021-10-09

## 2021-09-29 RX ORDER — ONDANSETRON 4 MG/1
4 TABLET, ORALLY DISINTEGRATING ORAL
Qty: 14 TABLET | Refills: 0 | Status: SHIPPED | OUTPATIENT
Start: 2021-09-29 | End: 2021-11-08

## 2021-09-29 RX ORDER — ONDANSETRON 2 MG/ML
4 INJECTION INTRAMUSCULAR; INTRAVENOUS
Status: COMPLETED | OUTPATIENT
Start: 2021-09-29 | End: 2021-09-29

## 2021-09-29 RX ADMIN — ONDANSETRON 4 MG: 2 INJECTION INTRAMUSCULAR; INTRAVENOUS at 05:21

## 2021-09-29 RX ADMIN — FAMOTIDINE 20 MG: 10 INJECTION INTRAVENOUS at 05:24

## 2021-09-29 NOTE — DISCHARGE INSTRUCTIONS
Return for pain, fever not resolving with motrin or tylenol, shortness of breath, vomiting, decreased fluid intake, weakness, numbness, dizziness, or any change or concerns.     MAKE APPOINTMENT WITH CARDIOLOGY CLINIC FOR FOLLOW UP

## 2021-09-29 NOTE — ED NOTES
0451- EKG to MD  0505- IV est, blood collected and taken to the lab  0524- allergies verified pt medicated as per orders (see Mar)

## 2021-09-29 NOTE — ED PROVIDER NOTES
Pt c/o mid chest pain, lasts 1-2 min per off and on x 2-3 days. No pain in between, no current pain. No fever or cough. No sob. No weakness or numbness. No sob. occ b/l upper flank fiona also, none now. No urinary changes. Mild nausea at times, no vomiting,. Concerned bp might be up, says h/o htn, but off meds since gastric bypass one yr ago. No stool changes. No meds taken for pain pta. Past Medical History:   Diagnosis Date    Anxiety and depression     Back pain     Constipation     Diverticulitis     Fibroids     GERD (gastroesophageal reflux disease)     HTN (hypertension)     no meds    Knee pain     Menopause     Morbid obesity (HCC)     OA (osteoarthritis)     of knees       Past Surgical History:   Procedure Laterality Date    HX BREAST REDUCTION  2016?     HX COLONOSCOPY      HX GASTRIC BYPASS      HX GYN      btl and partial hysterectomy    HX HYSTERECTOMY  Sept?  2020    [facility] done at [facility] w/ Dr Philip Monet  11/30/2020    HX OOPHORECTOMY      IA BREAST SURGERY PROCEDURE UNLISTED      reduction         Family History:   Problem Relation Age of Onset    Hypertension Mother     Hypertension Father        Social History     Socioeconomic History    Marital status:      Spouse name: Not on file    Number of children: Not on file    Years of education: Not on file    Highest education level: Not on file   Occupational History    Not on file   Tobacco Use    Smoking status: Never Smoker    Smokeless tobacco: Never Used   Vaping Use    Vaping Use: Never used   Substance and Sexual Activity    Alcohol use: Not Currently    Drug use: Not Currently    Sexual activity: Yes     Partners: Male     Birth control/protection: None   Other Topics Concern    Not on file   Social History Narrative    Not on file     Social Determinants of Health     Financial Resource Strain:     Difficulty of Paying Living Expenses:    Food Insecurity:     Worried About [address] in the Last Year:    [address] in the Last Year:    Transportation Needs:     Lack of Transportation (Medical):  Lack of Transportation (Non-Medical):    Physical Activity:     Days of Exercise per Week:     Minutes of Exercise per Session:    Stress:     Feeling of Stress :    Social Connections:     Frequency of Communication with Friends and Family:     Frequency of Social Gatherings with Friends and Family:     Attends Buddhist Services:     Active Member of Clubs or Organizations:     Attends Club or Organization Meetings:     Marital Status:    Intimate Partner Violence:     Fear of Current or Ex-Partner:     Emotionally Abused:     Physically Abused:     Sexually Abused: ALLERGIES: Lisinopril    Review of Systems   Constitutional: Negative for fever. HENT: Negative for congestion. Respiratory: Negative for cough and shortness of breath. Cardiovascular: Positive for chest pain. Gastrointestinal: Positive for nausea. Negative for abdominal pain and vomiting. Musculoskeletal: Negative for back pain. Skin: Negative for rash. Neurological: Negative for light-headedness. All other systems reviewed and are negative. Vitals:    09/29/21 0450 09/29/21 0547   BP: (!) 158/104 (!) 165/79   Pulse: 78 66   Resp: 16 17   Temp: 98.8 °F (37.1 °C)    SpO2: 100% 100%   Weight: 106.6 kg (235 lb)    Height: 5' (1.524 m)             Physical Exam  Vitals and nursing note reviewed. Constitutional:       Appearance: She is well-developed. She is not diaphoretic. HENT:      Head: Normocephalic and atraumatic. Nose: Nose normal.   Eyes:      Pupils: Pupils are equal, round, and reactive to light. Cardiovascular:      Rate and Rhythm: Normal rate and regular rhythm. Heart sounds: No murmur heard. Pulmonary:      Effort: Pulmonary effort is normal.      Breath sounds: No wheezing.    Abdominal:      Palpations: Abdomen is soft.      Tenderness: There is no abdominal tenderness. Musculoskeletal:         General: No tenderness. Cervical back: Normal range of motion. Skin:     General: Skin is dry. Capillary Refill: Capillary refill takes less than 2 seconds. Findings: No rash. Neurological:      Mental Status: She is alert and oriented to person, place, and time. Psychiatric:         Mood and Affect: Mood normal.          MDM       Procedures    Vitals:  Patient Vitals for the past 12 hrs:   Temp Pulse Resp BP SpO2   09/29/21 0547 -- 66 17 (!) 165/79 100 %   09/29/21 0450 98.8 °F (37.1 °C) 78 16 (!) 158/104 100 %         Medications ordered:   Medications   ondansetron (ZOFRAN) injection 4 mg (4 mg IntraVENous Given 9/29/21 0521)   famotidine (PF) (PEPCID) injection 20 mg (20 mg IntraVENous Given 9/29/21 0524)         Lab findings:  Recent Results (from the past 12 hour(s))   EKG, 12 LEAD, INITIAL    Collection Time: 09/29/21  4:51 AM   Result Value Ref Range    Ventricular Rate 73 BPM    Atrial Rate 73 BPM    P-R Interval 200 ms    QRS Duration 95 ms    Q-T Interval 411 ms    QTC Calculation (Bezet) 453 ms    Calculated P Axis -22 degrees    Calculated R Axis -5 degrees    Diagnosis       Sinus rhythm  Left ventricular hypertrophy    Confirmed by Clearance JONATHAN Alberts ([id number]) on 9/29/2021 8:25:10 AM     CBC WITH AUTOMATED DIFF    Collection Time: 09/29/21  5:05 AM   Result Value Ref Range    WBC 8.5 4.6 - 13.2 K/uL    RBC 3.74 (L) 4.20 - 5.30 M/uL    HGB 11.7 (L) 12.0 - 16.0 g/dL    HCT 35.4 35.0 - 45.0 %    MCV 94.7 78.0 - 100.0 FL    MCH 31.3 24.0 - 34.0 PG    MCHC 33.1 31.0 - 37.0 g/dL    RDW 12.1 11.6 - 14.5 %    PLATELET 198 823 - 777 K/uL    MPV 9.9 9.2 - 11.8 FL    NRBC 0.0 0.0  WBC    ABSOLUTE NRBC 0.00 0.00 - 0.01 K/uL    NEUTROPHILS 54 40 - 73 %    LYMPHOCYTES 37 21 - 52 %    MONOCYTES 7 3 - 10 %    EOSINOPHILS 1 0 - 5 %    BASOPHILS 1 0 - 2 %    IMMATURE GRANULOCYTES 0 0 - 0.5 %    ABS.  NEUTROPHILS 4.7 1.8 - 8.0 K/UL    ABS. LYMPHOCYTES 3.2 0.9 - 3.6 K/UL    ABS. MONOCYTES 0.6 0.05 - 1.2 K/UL    ABS. EOSINOPHILS 0.1 0.0 - 0.4 K/UL    ABS. BASOPHILS 0.1 0.0 - 0.1 K/UL    ABS. IMM. GRANS. 0.0 0.00 - 0.04 K/UL    DF AUTOMATED     METABOLIC PANEL, BASIC    Collection Time: 09/29/21  5:05 AM   Result Value Ref Range    Sodium 135 135 - 145 mmol/L    Potassium 3.5 3.2 - 5.1 mmol/L    Chloride 104 94 - 111 mmol/L    CO2 25 21 - 33 mmol/L    Anion gap 6 mmol/L    Glucose 88 70 - 110 mg/dL    BUN 9 9 - 21 mg/dL    Creatinine 0.50 (L) 0.70 - 1.20 mg/dL    BUN/Creatinine ratio 18      GFR est AA >60 ml/min/1.73m2    GFR est non-AA >60 ml/min/1.73m2    Calcium 9.0 8.5 - 10.5 mg/dL   TROPONIN I    Collection Time: 09/29/21  5:05 AM   Result Value Ref Range    Troponin-I, Qt. <0.02 (L) 0.02 - 0.05 ng/mL   LIPASE    Collection Time: 09/29/21  5:05 AM   Result Value Ref Range    Lipase 27 10 - 57 U/L   HEPATIC FUNCTION PANEL    Collection Time: 09/29/21  5:05 AM   Result Value Ref Range    Protein, total 6.9 6.1 - 8.4 g/dL    Albumin 3.7 3.5 - 4.7 g/dL    Globulin 3.2 g/dL    A-G Ratio 1.2      Bilirubin, total 0.6 0.2 - 1.0 mg/dL    Bilirubin, direct 0.1 0.0 - 0.3 mg/dL    Alk. phosphatase 24 (L) 38 - 126 U/L    AST (SGOT) 13 (L) 14 - 74 U/L    ALT (SGPT) 12 3 - 52 U/L   MAGNESIUM    Collection Time: 09/29/21  5:05 AM   Result Value Ref Range    Magnesium 1.8 1.7 - 2.8 mg/dL   LACTIC ACID    Collection Time: 09/29/21  5:05 AM   Result Value Ref Range    Lactic acid 1.0 0.5 - 2.0 mmol/L   TROPONIN I    Collection Time: 09/29/21  7:05 AM   Result Value Ref Range    Troponin-I, Qt. <0.02 (L) 0.02 - 0.05 ng/mL           X-Ray, CT or other radiology findings or impressions:  XR CHEST PORT   Final Result      No acute cardiopulmonary abnormality. Progress notes, Consult notes or additional Procedure notes:   6:17 AM pt pain free after treatment. No emc. Neg w.u so far for cad/ptx/dissection/acute abd.   To check 3 hr delta trop,  If wnl stable for dc and close f/u. Det ret inst to be given. 0700am: signed out by Dr Devine Pac; 43 yo obese bf p/o Rou en Y bypass 11/'20; HTN that presents with lightheadedness; intermittent chest tightness; nausea for the past 3 - 4 days. Non exertional cp; no hemoptysis; no fever/chills. Concerned about BP. No CP now. VSS; NAD; lungs clear; Heart RRR; Abd nontender; Ext No CCE. EKG no acute changes. IMP: CP; low HEART risk score. Low Well's PE score. No fever or tachy to suggest infection or leak. No abd pain to suggest internal hernia. No anemia to suggest bleeding. Diagnosis:   1. Chest pain, unspecified type    2. Flank pain    3. Hypertension, unspecified type        Disposition: home    Follow-up Information     Follow up With Specialties Details Why Contact Info    Azalea Kelsey MD [facility] Medicine Call today  [address]  [phone], [address], NP Nurse Practitioner   [address] [place] [address]  [phone]             Patient's Medications   Start Taking    FAMOTIDINE (PEPCID) 20 MG TABLET    Take 1 Tablet by mouth daily for 10 days. ONDANSETRON (ZOFRAN ODT) 4 MG DISINTEGRATING TABLET    Take 1 Tablet by mouth every eight (8) hours as needed for Nausea. Continue Taking    BIOTIN 2,500 MCG CAP    Take  by mouth. CALCIUM CITRATE 200 MG (950 MG) TABLET    Take 500 mg by mouth three (3) times daily. CHOLECALCIFEROL (VITAMIN D3) (5000 UNITS/125 MCG) TAB TABLET    Take 5,000 Units by mouth daily. CYANOCOBALAMIN 1,000 MCG TABLET    Take 1,000 mcg by mouth daily. FERROUS SULFATE 325 MG (65 MG IRON) TABLET    Take 325 mg by mouth daily. MULTIVITAMIN CAPSULE    Take 1 Cap by mouth two (2) times a day. flinestones vitamin     THIAMINE HCL (VITAMIN B-1) 100 MG TABLET    Take 100 mg by mouth daily. ZOLPIDEM (AMBIEN) 5 MG TABLET    Take 1 Tablet by mouth nightly as needed for Sleep. Max Daily Amount: 5 mg.    These Medications have changed    No medications on file   Stop Taking    No medications on file

## 2021-10-29 ENCOUNTER — OFFICE VISIT (OUTPATIENT)
Dept: FAMILY MEDICINE CLINIC | Age: 46
End: 2021-10-29
Payer: MEDICAID

## 2021-10-29 VITALS
OXYGEN SATURATION: 99 % | BODY MASS INDEX: 45.9 KG/M2 | DIASTOLIC BLOOD PRESSURE: 100 MMHG | TEMPERATURE: 98.7 F | HEART RATE: 76 BPM | WEIGHT: 235 LBS | SYSTOLIC BLOOD PRESSURE: 162 MMHG

## 2021-10-29 DIAGNOSIS — I10 ESSENTIAL HYPERTENSION: Primary | ICD-10-CM

## 2021-10-29 PROCEDURE — 99214 OFFICE O/P EST MOD 30 MIN: CPT | Performed by: STUDENT IN AN ORGANIZED HEALTH CARE EDUCATION/TRAINING PROGRAM

## 2021-10-29 RX ORDER — AMLODIPINE BESYLATE 10 MG/1
10 TABLET ORAL DAILY
Qty: 90 TABLET | Refills: 0 | Status: SHIPPED | OUTPATIENT
Start: 2021-10-29 | End: 2022-03-23

## 2021-10-29 NOTE — PROGRESS NOTES
Subjective:   Krishan Young is a 42 y.o. female who was seen for Follow-up (1 month follow up )    Patient's blood pressure continues to be uncontrolled of her medications. She has been on norvasc before and would like to be placed back on it. Home Medications    Medication Sig Start Date End Date Taking? Authorizing Provider   amLODIPine (NORVASC) 10 mg tablet Take 1 Tablet by mouth daily. 10/29/21  Yes Vee Chatterjee MD   ondansetron (Zofran ODT) 4 mg disintegrating tablet Take 1 Tablet by mouth every eight (8) hours as needed for Nausea. 9/29/21  Yes Dorothea Hameed MD   zolpidem (AMBIEN) 5 mg tablet Take 1 Tablet by mouth nightly as needed for Sleep. Max Daily Amount: 5 mg. 9/24/21  Yes Vee Chatterjee MD   ferrous sulfate 325 mg (65 mg iron) tablet Take 325 mg by mouth daily. Yes Other, MD Ole   Biotin 2,500 mcg cap Take  by mouth. Yes Provider, Historical   cholecalciferol (VITAMIN D3) (5000 Units/125 mcg) tab tablet Take 5,000 Units by mouth daily. Yes Provider, Historical   thiamine HCL (Vitamin B-1) 100 mg tablet Take 100 mg by mouth daily. Yes Provider, Historical   cyanocobalamin 1,000 mcg tablet Take 1,000 mcg by mouth daily. Yes Provider, Historical   calcium citrate 200 mg (950 mg) tablet Take 500 mg by mouth three (3) times daily. Yes Provider, Historical   multivitamin capsule Take 1 Cap by mouth two (2) times a day. flinestones vitamin    Yes Provider, Historical      Allergies   Allergen Reactions    Lisinopril Other (comments)     Swollen lips     Social History     Tobacco Use    Smoking status: Never Smoker    Smokeless tobacco: Never Used   Vaping Use    Vaping Use: Never used   Substance Use Topics    Alcohol use: Not Currently    Drug use: Not Currently            Review of Systems   All other systems reviewed and are negative.          Objective:     Visit Vitals  BP (!) 162/100   Pulse 76   Temp 98.7 °F (37.1 °C)   Wt 235 lb (106.6 kg)   LMP 07/16/2020 SpO2 99%   BMI 45.90 kg/m²        General: alert, oriented, not in distress  Head: scalp normal, atraumatic  Eyes: pupils are equal and reactive, full and intact EOM's  Ears: patent ear canal, intact tympanic membrane  Nose: normal turbinates, no congestion or discharge  Lips/Mouth: moist lips and buccal mucosa, non-enlarged tonsils, pink throat  Neck: supple, no JVD, no lymphadenopathy, non-palpable thyroid  Chest/Lungs: clear breath sounds, no wheezing or crackles  Heart: normal rate, regular rhythm, no murmur  Abdomen: soft, non-distended, non-tender, normal bowel sounds, no organomegaly, no masses  Extremities: no focal deformities, no edema  Skin: no active skin lesions    Laboratory/Tests:  Admission on 09/29/2021, Discharged on 09/29/2021   Component Date Value Ref Range Status    Ventricular Rate 09/29/2021 73  BPM Final    Atrial Rate 09/29/2021 73  BPM Final    P-R Interval 09/29/2021 200  ms Final    QRS Duration 09/29/2021 95  ms Final    Q-T Interval 09/29/2021 411  ms Final    QTC Calculation (Bezet) 09/29/2021 453  ms Final    Calculated P Axis 09/29/2021 -22  degrees Final    Calculated R Axis 09/29/2021 -5  degrees Final    Diagnosis 09/29/2021    Final                    Value:Sinus rhythm  Left ventricular hypertrophy    Confirmed by JONATHAN COVARRUBIAS ([id number]) on 9/29/2021 8:25:10 AM      WBC 09/29/2021 8.5  4.6 - 13.2 K/uL Final    RBC 09/29/2021 3.74* 4.20 - 5.30 M/uL Final    HGB 09/29/2021 11.7* 12.0 - 16.0 g/dL Final    HCT 09/29/2021 35.4  35.0 - 45.0 % Final    MCV 09/29/2021 94.7  78.0 - 100.0 FL Final    MCH 09/29/2021 31.3  24.0 - 34.0 PG Final    MCHC 09/29/2021 33.1  31.0 - 37.0 g/dL Final    RDW 09/29/2021 12.1  11.6 - 14.5 % Final    PLATELET 73/58/1434 587  135 - 420 K/uL Final    MPV 09/29/2021 9.9  9.2 - 11.8 FL Final    NRBC 09/29/2021 0.0  0.0  WBC Final    ABSOLUTE NRBC 09/29/2021 0.00  0.00 - 0.01 K/uL Final    NEUTROPHILS 09/29/2021 54  40 - 73 % Final    LYMPHOCYTES 09/29/2021 37  21 - 52 % Final    MONOCYTES 09/29/2021 7  3 - 10 % Final    EOSINOPHILS 09/29/2021 1  0 - 5 % Final    BASOPHILS 09/29/2021 1  0 - 2 % Final    IMMATURE GRANULOCYTES 09/29/2021 0  0 - 0.5 % Final    ABS. NEUTROPHILS 09/29/2021 4.7  1.8 - 8.0 K/UL Final    ABS. LYMPHOCYTES 09/29/2021 3.2  0.9 - 3.6 K/UL Final    ABS. MONOCYTES 09/29/2021 0.6  0.05 - 1.2 K/UL Final    ABS. EOSINOPHILS 09/29/2021 0.1  0.0 - 0.4 K/UL Final    ABS. BASOPHILS 09/29/2021 0.1  0.0 - 0.1 K/UL Final    ABS. IMM. GRANS. 09/29/2021 0.0  0.00 - 0.04 K/UL Final    DF 09/29/2021 AUTOMATED    Final    Sodium 09/29/2021 135  135 - 145 mmol/L Final    Potassium 09/29/2021 3.5  3.2 - 5.1 mmol/L Final    Chloride 09/29/2021 104  94 - 111 mmol/L Final    CO2 09/29/2021 25  21 - 33 mmol/L Final    Anion gap 09/29/2021 6  mmol/L Final    Glucose 09/29/2021 88  70 - 110 mg/dL Final    BUN 09/29/2021 9  9 - 21 mg/dL Final    Creatinine 09/29/2021 0.50* 0.70 - 1.20 mg/dL Final    BUN/Creatinine ratio 09/29/2021 18    Final    GFR est AA 09/29/2021 >60  ml/min/1.73m2 Final    GFR est non-AA 09/29/2021 >60  ml/min/1.73m2 Final    Comment: Estimated GFR is calculated using the IDMS-traceable Modification of Diet in Renal Disease (MDRD) Study equation, reported for both  Americans (GFRAA) and non- Americans (GFRNA), and normalized to 1.73m2 body surface area. The physician must decide which value applies to the patient. The MDRD study equation should only be used in individuals age 25 or older. It has not been validated for the following: pregnant women, patients with serious comorbid conditions, or on certain medications, or persons with extremes of body size, muscle mass, or nutritional status.       Calcium 09/29/2021 9.0  8.5 - 10.5 mg/dL Final    Troponin-I, Qt. 09/29/2021 <0.02* 0.02 - 0.05 ng/mL Final    Lipase 09/29/2021 27  10 - 57 U/L Final    Protein, total 09/29/2021 6.9 6.1 - 8.4 g/dL Final    Albumin 09/29/2021 3.7  3.5 - 4.7 g/dL Final    Globulin 09/29/2021 3.2  g/dL Final    A-G Ratio 09/29/2021 1.2    Final    Bilirubin, total 09/29/2021 0.6  0.2 - 1.0 mg/dL Final    Bilirubin, direct 09/29/2021 0.1  0.0 - 0.3 mg/dL Final    Alk. phosphatase 09/29/2021 24* 38 - 126 U/L Final    AST (SGOT) 09/29/2021 13* 14 - 74 U/L Final    ALT (SGPT) 09/29/2021 12  3 - 52 U/L Final    Troponin-I, Qt. 09/29/2021 <0.02* 0.02 - 0.05 ng/mL Final    Magnesium 09/29/2021 1.8  1.7 - 2.8 mg/dL Final    Lactic acid 09/29/2021 1.0  0.5 - 2.0 mmol/L Final   Hospital Outpatient Visit on 09/25/2021   Component Date Value Ref Range Status    LIPID PROFILE 09/25/2021      Final    Cholesterol, total 09/25/2021 210* <200 mg/dL Final    Triglyceride 09/25/2021 115  <150 mg/dL Final    Comment: The drugs N-acetylcysteine (NAC) and  Metamiszole have been found to cause falsely  low results in this chemical assay. Please  be sure to submit blood samples obtained  BEFORE administration of either of these  drugs to assure correct results.       HDL Cholesterol 09/25/2021 99* 40 - 60 mg/dL Final    LDL, calculated 09/25/2021 88  0 - 100 mg/dL Final    VLDL, calculated 09/25/2021 23  mg/dL Final    CHOL/HDL Ratio 09/25/2021 2.1  0 - 5.0   Final   Hospital Outpatient Visit on 09/03/2021   Component Date Value Ref Range Status    WBC 09/03/2021 9.1  4.6 - 13.2 K/uL Final    RBC 09/03/2021 3.76* 4.20 - 5.30 M/uL Final    HGB 09/03/2021 11.5* 12.0 - 16.0 g/dL Final    HCT 09/03/2021 35.8  35.0 - 45.0 % Final    MCV 09/03/2021 95.2  78.0 - 100.0 FL Final    PLEASE NOTE NEW REFERENCE RANGE    MCH 09/03/2021 30.6  24.0 - 34.0 PG Final    MCHC 09/03/2021 32.1  31.0 - 37.0 g/dL Final    RDW 09/03/2021 12.9  11.6 - 14.5 % Final    PLATELET 84/93/5171 451  135 - 420 K/uL Final    MPV 09/03/2021 10.4  9.2 - 11.8 FL Final    NRBC 09/03/2021 0.0  0.0  WBC Final    ABSOLUTE NRBC 09/03/2021 0.00 0.00 - 0.01 K/uL Final    NEUTROPHILS 09/03/2021 56  40 - 73 % Final    LYMPHOCYTES 09/03/2021 34  21 - 52 % Final    MONOCYTES 09/03/2021 6  3 - 10 % Final    EOSINOPHILS 09/03/2021 3  0 - 5 % Final    BASOPHILS 09/03/2021 1  0 - 2 % Final    IMMATURE GRANULOCYTES 09/03/2021 0  0 - 0.5 % Final    ABS. NEUTROPHILS 09/03/2021 5.2  1.8 - 8.0 K/UL Final    ABS. LYMPHOCYTES 09/03/2021 3.1  0.9 - 3.6 K/UL Final    ABS. MONOCYTES 09/03/2021 0.6  0.05 - 1.2 K/UL Final    ABS. EOSINOPHILS 09/03/2021 0.2  0.0 - 0.4 K/UL Final    ABS. BASOPHILS 09/03/2021 0.1  0.0 - 0.1 K/UL Final    ABS. IMM. GRANS. 09/03/2021 0.0  0.00 - 0.04 K/UL Final    DF 09/03/2021 AUTOMATED    Final    Sodium 09/03/2021 138  135 - 145 mmol/L Final    Potassium 09/03/2021 3.8  3.2 - 5.1 mmol/L Final    Chloride 09/03/2021 103  94 - 111 mmol/L Final    CO2 09/03/2021 27  21 - 33 mmol/L Final    Anion gap 09/03/2021 8  mmol/L Final    Glucose 09/03/2021 84  70 - 110 mg/dL Final    BUN 09/03/2021 7* 9 - 21 mg/dL Final    Creatinine 09/03/2021 0.50* 0.70 - 1.20 mg/dL Final    BUN/Creatinine ratio 09/03/2021 14    Final    GFR est AA 09/03/2021 >60  ml/min/1.73m2 Final    GFR est non-AA 09/03/2021 >60  ml/min/1.73m2 Final    Comment: Estimated GFR is calculated using the IDMS-traceable Modification of Diet in Renal Disease (MDRD) Study equation, reported for both  Americans (GFRAA) and non- Americans (GFRNA), and normalized to 1.73m2 body surface area. The physician must decide which value applies to the patient. The MDRD study equation should only be used in individuals age 25 or older. It has not been validated for the following: pregnant women, patients with serious comorbid conditions, or on certain medications, or persons with extremes of body size, muscle mass, or nutritional status.       Calcium 09/03/2021 9.2  8.5 - 10.5 mg/dL Final    Bilirubin, total 09/03/2021 0.6  0.2 - 1.0 mg/dL Final    AST (SGOT) 09/03/2021 12* 14 - 74 U/L Final    ALT (SGPT) 09/03/2021 14  3 - 52 U/L Final    Alk. phosphatase 09/03/2021 25* 38 - 126 U/L Final    Protein, total 09/03/2021 7.2  6.1 - 8.4 g/dL Final    Albumin 09/03/2021 3.6  3.5 - 4.7 g/dL Final    Globulin 09/03/2021 3.6  g/dL Final    A-G Ratio 09/03/2021 1.0    Final    TSH 09/03/2021 1.72  0.35 - 6.20 uIU/mL Final    Comment:    Due to TSH heterogeneity, both structurally and degree of glycosylation, monoclonal antibodies used in the TSH assay may not accurately quantitate TSH. Therefore, this result should be correlated with clinical findings as well as with other assessments of thyroid function, e.g., free T4, free T3.  Vitamin B12 09/03/2021 932* 211 - 911 pg/mL Final    Folate 09/03/2021 18.6* 3.10 - 17.50 ng/mL Final    Ferritin 09/03/2021 63  8 - 388 ng/mL Final    Hemoglobin A1c 09/03/2021 5.4  4.2 - 5.6 % Final    Comment: (NOTE)  HbA1C Interpretive Ranges  <5.7              Normal  5.7 - 6.4         Consider Prediabetes  >6.5              Consider Diabetes      Est. average glucose 09/03/2021 108  mg/dL Final    Comment: (NOTE)  The eAG should be interpreted with patient characteristics in mind   since ethnicity, interindividual differences, red cell lifespan,   variation in rates of glycation, etc. may affect the validity of the   calculation.  Vitamin D 25-Hydroxy 09/03/2021 40.8  30 - 100 ng/mL Final    Comment: (NOTE)  Deficiency               <20 ng/mL  Insufficiency          20-30 ng/mL  Sufficient             ng/mL  Possible toxicity       >100 ng/mL    The Method used is Siemens Advia Centaur currently standardized to a   Center of Disease Control and Prevention (CDC) certified reference   [address]. Samples containing fluorescein dye can produce falsely   elevated values when tested with the ADVIA Centaur Vitamin D Assay.    It is recommended that results in the toxic range, >100 ng/mL, be   retested 72 hours post fluorescein exposure. Hospital Outpatient Visit on 05/18/2021   Component Date Value Ref Range Status    Albumin 05/18/2021 3.4* 3.5 - 4.7 g/dL Final    WBC 05/18/2021 8.6  4.6 - 13.2 K/uL Final    RBC 05/18/2021 3.93* 4.20 - 5.30 M/uL Final    HGB 05/18/2021 11.5* 12.0 - 16.0 g/dL Final    HCT 05/18/2021 36.5  35.0 - 45.0 % Final    MCV 05/18/2021 92.9  74.0 - 97.0 FL Final    MCH 05/18/2021 29.3  24.0 - 34.0 PG Final    MCHC 05/18/2021 31.5  31.0 - 37.0 g/dL Final    RDW 05/18/2021 14.0  11.6 - 14.5 % Final    PLATELET 97/88/3073 574  135 - 420 K/uL Final    MPV 05/18/2021 10.5  9.2 - 11.8 FL Final    NEUTROPHILS 05/18/2021 55  40 - 73 % Final    LYMPHOCYTES 05/18/2021 36  21 - 52 % Final    MONOCYTES 05/18/2021 8  3 - 10 % Final    EOSINOPHILS 05/18/2021 1  0 - 5 % Final    BASOPHILS 05/18/2021 0  0 - 2 % Final    IMMATURE GRANULOCYTES 05/18/2021 0  % Final    ABS. NEUTROPHILS 05/18/2021 4.8  1.8 - 8.0 K/UL Final    ABS. LYMPHOCYTES 05/18/2021 3.1  0.9 - 3.6 K/UL Final    ABS. MONOCYTES 05/18/2021 0.7  0.05 - 1.2 K/UL Final    ABS. EOSINOPHILS 05/18/2021 0.1  0.0 - 0.4 K/UL Final    ABS. BASOPHILS 05/18/2021 0.0  0.0 - 0.1 K/UL Final    ABS. IMM.  GRANS. 05/18/2021 0.0  K/UL Final    Ferritin 05/18/2021 26  26 - 388 ng/mL Final    Iron 05/18/2021 116  35 - 150 ug/dL Final    Sodium 05/18/2021 137  135 - 145 mmol/L Final    Potassium 05/18/2021 3.5  3.2 - 5.1 mmol/L Final    Chloride 05/18/2021 103  94 - 111 mmol/L Final    CO2 05/18/2021 25  21 - 33 mmol/L Final    Anion gap 05/18/2021 9  mmol/L Final    Glucose 05/18/2021 87  70 - 110 mg/dL Final    BUN 05/18/2021 8* 9 - 21 mg/dL Final    Creatinine 05/18/2021 0.60* 0.70 - 1.20 mg/dL Final    BUN/Creatinine ratio 05/18/2021 13    Final    GFR est AA 05/18/2021 >60  ml/min/1.73m2 Final    GFR est non-AA 05/18/2021 >60  ml/min/1.73m2 Final    Comment: Estimated GFR is calculated using the IDMS-traceable Modification of Diet in Renal Disease (MDRD) Study equation, reported for both  Americans (GFRAA) and non- Americans (GFRNA), and normalized to 1.73m2 body surface area. The physician must decide which value applies to the patient. The MDRD study equation should only be used in individuals age 25 or older. It has not been validated for the following: pregnant women, patients with serious comorbid conditions, or on certain medications, or persons with extremes of body size, muscle mass, or nutritional status.  Calcium 05/18/2021 9.0  8.5 - 10.5 mg/dL Final    Vitamin B1 05/18/2021 250.2* 66.5 - 200.0 nmol/L Final    Comment: This test was developed and its performance characteristics  determined by Iain Padron. It has not been cleared or approved  by the Food and Drug Administration. Performed At: [address] [id number]  Gene Aguila MD TROY:[id number]      Vitamin B12 05/18/2021 956  193 - 986 pg/mL Final    Folate 05/18/2021 14.3  5.0 - 21.0 ng/mL Final    Vitamin D 25-Hydroxy 05/18/2021 49.7  30 - 100 ng/mL Final    Comment: (NOTE)  Deficiency               <20 ng/mL  Insufficiency          20-30 ng/mL  Sufficient             ng/mL  Possible toxicity       >100 ng/mL    The Method used is Siemens Advia Centaur currently standardized to a   Center of Disease Control and Prevention (CDC) certified reference   [address]. Samples containing fluorescein dye can produce falsely   elevated values when tested with the ADVIA Centaur Vitamin D Assay. It is recommended that results in the toxic range, >100 ng/mL, be   retested 72 hours post fluorescein exposure.      Orders Only on 02/27/2021   Component Date Value Ref Range Status    WBC 02/27/2021 10.5  3.4 - 10.8 x10E3/uL Final    RBC 02/27/2021 4.21  3.77 - 5.28 x10E6/uL Final    HGB 02/27/2021 12.1  11.1 - 15.9 g/dL Final    HCT 02/27/2021 35.5  34.0 - 46.6 % Final    MCV 02/27/2021 84  79 - 97 fL Final    MCH 02/27/2021 28.7 26.6 - 33.0 pg Final    MCHC 02/27/2021 34.1  31.5 - 35.7 g/dL Final    RDW 02/27/2021 15.7* 11.7 - 15.4 % Final    PLATELET 32/18/2640 221  150 - 450 x10E3/uL Final    Vitamin B1 02/27/2021 CANCELED  nmol/L Final-Edited    Comment: Test not performed. No frozen whole blood received. Result canceled by the ancillary.  Albumin 02/27/2021 3.8  3.8 - 4.8 g/dL Final    Iron 02/27/2021 38  27 - 159 ug/dL Final    SPECIMEN STATUS REPORT 02/27/2021 COMMENT   Final    Comment: Please note  Please note  The date and/or time of collection was not indicated on the  requisition as required by state and federal law. The date of  receipt of the specimen was used as the collection date if not  supplied. Admission on 11/30/2020, Discharged on 12/01/2020   Component Date Value Ref Range Status    HCG urine, QL 11/30/2020 Negative  NEG   Final    Test results should be confirmed using serum quantitative hCG when detection of pregnancy is critical and before performing any critical medical procedure.  BENZODIAZEPINES 11/30/2020 Negative  NEG   Final    BARBITURATES 11/30/2020 Negative  NEG   Final    THC (TH-CANNABINOL) 11/30/2020 Negative  NEG   Final    OPIATES 11/30/2020 Negative  NEG   Final    PCP(PHENCYCLIDINE) 11/30/2020 Negative  NEG   Final    COCAINE 11/30/2020 Negative  NEG   Final    AMPHETAMINES 11/30/2020 Negative  NEG   Final    METHADONE 11/30/2020 Negative  NEG   Final    HDSCOM 11/30/2020 (NOTE)   Final    Comment: Specimen analysis was performed without chain of custody handling. These results should be used for medical purposes only and not for   legal or employment purposes. Unconfirmed screening results must not   be used for non-medical purposes.     The cut-off concentration for positive results are as follows:    AMPH     1000 ng/mL  COTY      200 ng/mL  DAVID      200 ng/mL  DEBBY       300 ng/mL  METH      300 ng/mL  OPI       300 ng/mL  PCP        25 ng/mL  THC        50 ng/mL        WBC 12/01/2020 14.4* 4.6 - 13.2 K/uL Final    RBC 12/01/2020 4.12* 4.20 - 5.30 M/uL Final    HGB 12/01/2020 11.4* 12.0 - 16.0 g/dL Final    HCT 12/01/2020 35.3  35.0 - 45.0 % Final    MCV 12/01/2020 85.7  74.0 - 97.0 FL Final    MCH 12/01/2020 27.7  24.0 - 34.0 PG Final    MCHC 12/01/2020 32.3  31.0 - 37.0 g/dL Final    RDW 12/01/2020 14.3  11.6 - 14.5 % Final    PLATELET 70/46/6392 117  135 - 420 K/uL Final    MPV 12/01/2020 9.2  9.2 - 11.8 FL Final    Sodium 12/01/2020 138  136 - 145 mmol/L Final    Potassium 12/01/2020 3.7  3.5 - 5.5 mmol/L Final    Chloride 12/01/2020 107  100 - 111 mmol/L Final    CO2 12/01/2020 26  21 - 32 mmol/L Final    Anion gap 12/01/2020 5  3.0 - 18 mmol/L Final    Glucose 12/01/2020 97  74 - 99 mg/dL Final    BUN 12/01/2020 3* 7.0 - 18 MG/DL Final    Creatinine 12/01/2020 0.50* 0.6 - 1.3 MG/DL Final    BUN/Creatinine ratio 12/01/2020 6* 12 - 20   Final    GFR est AA 12/01/2020 >60  >60 ml/min/1.73m2 Final    GFR est non-AA 12/01/2020 >60  >60 ml/min/1.73m2 Final    Comment: (NOTE)  Estimated GFR is calculated using the Modification of Diet in Renal   Disease (MDRD) Study equation, reported for both  Americans   (GFRAA) and non- Americans (GFRNA), and normalized to 1.73m2   body surface area. The physician must decide which value applies to   the patient. The MDRD study equation should only be used in   individuals age 25 or older. It has not been validated for the   following: pregnant women, patients with serious comorbid conditions,   or on certain medications, or persons with extremes of body size,   muscle mass, or nutritional status.       Calcium 12/01/2020 8.8  8.5 - 10.1 MG/DL Final    Magnesium 12/01/2020 2.0  1.6 - 2.6 mg/dL Final   Hospital Outpatient Visit on 11/25/2020   Component Date Value Ref Range Status    SARS-CoV-2 11/25/2020 Not Detected  Not Detected   Final    Comment: (NOTE)  This nucleic acid amplification test was developed and its  performance characteristics determined by [employer]. Nucleic acid amplification tests include PCR and TMA. This test has  not been FDA cleared or approved. This test has been authorized by  FDA under an Emergency Use Authorization (EUA). This test is only  authorized for the duration of time the declaration that  circumstances exist justifying the authorization of the emergency use  of in vitro diagnostic tests for detection of SARS-CoV-2 virus and/or  diagnosis of COVID-19 infection under section 564(b)(1) of the Act,  21 U. S.C. 001JIP-7(E) (1), unless the authorization is terminated or  revoked sooner. When diagnostic testing is negative, the possibility of a false  negative result should be considered in the context of a patient's  recent exposures and the presence of clinical signs and symptoms  consistent with COVID-19. An individual without symptoms of COVID-  19 and who is not shedding SARS-CoV-2 vi                           meet would expect to have a  negative (not detected) result in this assay. [place] [facility] [place]  [address] [id number]  Miya Berg MD KW:[id number]           Assessment & Plan:     1. Essential hypertension  Uncontrolled. Start norvasc. F/u in 1 month             I have discussed the diagnosis with the patient and the intended plan as seen in the above orders. The patient has received an after-visit summary and questions were answered concerning future plans. I have discussed medication side effects and warnings with the patient as well. I have reviewed the plan of care with the patient, accepted their input and they are in agreement with the treatment goals. Previous lab and imaging results were reviewed by me.        Néstor Cuenca MD  October 29, 2021

## 2021-10-29 NOTE — PROGRESS NOTES
Jodi Light presents today for   Chief Complaint   Patient presents with    Follow-up     1 month follow up        Is someone accompanying this pt? no    Is the patient using any DME equipment during OV? no    Depression Screening:  3 most recent PHQ Screens 10/29/2021   Little interest or pleasure in doing things Not at all   Feeling down, depressed, irritable, or hopeless Not at all   Total Score PHQ 2 0       Learning Assessment:  Learning Assessment 9/1/2020   PRIMARY LEARNER Patient   HIGHEST LEVEL OF EDUCATION - PRIMARY LEARNER  DID NOT GRADUATE [facility] PRIMARY LEARNER NONE   CO-LEARNER CAREGIVER No   PRIMARY LANGUAGE ENGLISH   LEARNER PREFERENCE PRIMARY LISTENING   ANSWERED BY Nak[address]   RELATIONSHIP SELF       Fall Risk  Fall Risk Assessment, last 12 mths 3/1/2021   Able to walk? Yes   Fall in past 12 months? 0   Do you feel unsteady? 0   Are you worried about falling 0       ADL  ADL Assessment 12/8/2020   Feeding yourself No Help Needed   Getting from bed to chair No Help Needed   Getting dressed No Help Needed   Bathing or showering No Help Needed   Walk across the room (includes cane/walker) No Help Needed   Using the telphone No Help Needed   Taking your medications No Help Needed   Preparing meals No Help Needed   Managing money (expenses/bills) No Help Needed   Moderately strenuous housework (laundry) No Help Needed   Shopping for personal items (toiletries/medicines) No Help Needed   Shopping for groceries No Help Needed   Driving No Help Needed   Climbing a flight of stairs No Help Needed   Getting to places beyond walking distances No Help Needed       Travel Screening:    Travel Screening     Question   Response    In the last month, have you been in contact with someone who was confirmed or suspected to have Coronavirus / COVID-19? No / Unsure    Have you had a COVID-19 viral test in the last 14 days?   No    Do you have any of the following new or worsening symptoms? None of these    Have you traveled internationally or domestically in the last month? No      Travel History   Travel since 09/29/21     No documented travel since 09/29/21          Health Maintenance reviewed and discussed and ordered per Provider. Health Maintenance Due   Topic Date Due    Hepatitis C Screening  Never done    DTaP/Tdap/Td series (1 - Tdap) Never done    Flu Vaccine (1) Never done   . Coordination of Care:  1. \"Have you been to the ER, urgent care clinic since your last visit? Hospitalized since your last visit? \" No    2. \"Have you seen or consulted any other health care providers outside of the [address] [place] [address] since your last visit? \" No     3. For patients aged 39-70: Has the patient had a colonoscopy? No     If the patient is female:    4. For patients aged 41-77: Has the patient had a mammogram within the past 2 years? Yes, HM satisfied with blue hyperlink    5. For patients aged 21-65: Has the patient had a pap smear?  Yes, HM satisfied with blue hyperlink

## 2021-11-08 ENCOUNTER — OFFICE VISIT (OUTPATIENT)
Dept: SURGERY | Age: 46
End: 2021-11-08
Payer: MEDICAID

## 2021-11-08 VITALS
DIASTOLIC BLOOD PRESSURE: 83 MMHG | TEMPERATURE: 98 F | BODY MASS INDEX: 46.33 KG/M2 | HEIGHT: 60 IN | OXYGEN SATURATION: 100 % | SYSTOLIC BLOOD PRESSURE: 136 MMHG | WEIGHT: 236 LBS | HEART RATE: 84 BPM

## 2021-11-08 DIAGNOSIS — Z98.84 S/P GASTRIC BYPASS: ICD-10-CM

## 2021-11-08 DIAGNOSIS — K91.2 POST-RESECTION MALABSORPTION: Primary | ICD-10-CM

## 2021-11-08 DIAGNOSIS — E66.01 MORBID OBESITY (HCC): ICD-10-CM

## 2021-11-08 DIAGNOSIS — Z71.3 ENCOUNTER FOR WEIGHT LOSS COUNSELING: ICD-10-CM

## 2021-11-08 PROCEDURE — 99213 OFFICE O/P EST LOW 20 MIN: CPT | Performed by: NURSE PRACTITIONER

## 2021-11-08 RX ORDER — PHENTERMINE HYDROCHLORIDE 37.5 MG/1
TABLET ORAL
Qty: 30 TABLET | Refills: 0 | Status: SHIPPED | OUTPATIENT
Start: 2021-11-08 | End: 2022-03-21

## 2021-11-08 NOTE — PATIENT INSTRUCTIONS
Central scheduling will call you to schedule your testing. Please allow 24-48 hours for them to contact you. IF you don't hear from them within that time frame please call them directly at #[phone].   [facility] [address], [address] [place] [address]  Get Directions  Tel: [phone]  Hours:  Monday- Friday  7:00 am - 6:00 pm  Saturday  9:00 am - 12:00 pm

## 2021-11-08 NOTE — PROGRESS NOTES
Bariatric Postoperative Progress Note    CC: Annual LGBP Follow Up    Sorin Flores is a 42 y.o. female now 1 years status post laparoscopic gastric bypass surgery performed on 11/30/20. Doing well overall. Currently eating eggs, protein shakes, chicken, fish, turkey, bananas, apples. Taking in 64 oz water,  60 g protein. 0 min of activity 7 days a week. Bowel movements are regular. The patient is not having any pain. She is compliant with multivitamins, calcium, Vit D and B12 supplements. Reports RLQ soreness that started 2 weeks ago. Occurs only when supine and turning to certain positions. Resolves with getting up. Denies any NSAID, ETOH, or tobacco use. Denies snacking, dietary indiscretions, or hunger. Weight Loss Metrics 11/8/2021 11/8/2021 10/29/2021 9/29/2021 9/24/2021 8/30/2021 8/30/2021   Pre op / Initial Wt 309 - - - - 309 -   Today's Wt - 236 lb 235 lb 235 lb 235 lb - 236 lb 12.8 oz   BMI - 46.09 kg/m2 45.9 kg/m2 45.9 kg/m2 45.9 kg/m2 - 46.25 kg/m2   Ideal Body Wt 119 - - - - 119 -   Excess Body Wt 190 - - - - 190 -   Goal Wt 157 - - - - - -   Wt loss to date 68 - - - - 72.2 -   % Wt Loss 0.48 - - - - 0.48 -   80%  - - - - 152 -     Comorbidities:  Hypertension: improved  Diabetes: not applicable  Obstructive Sleep Apnea: resolved  Hyperlipidemia: not applicable  Stress Urinary Incontinence: not applicable  Gastroesophageal Reflux: resolved  Weight related arthropathy:improved        Past Medical History:   Diagnosis Date    Anxiety and depression     Back pain     Constipation     Diverticulitis     Fibroids     GERD (gastroesophageal reflux disease)     HTN (hypertension)     no meds    Knee pain     Menopause     Morbid obesity (HCC)     OA (osteoarthritis)     of knees       Past Surgical History:   Procedure Laterality Date    HX BREAST REDUCTION  2016?     HX COLONOSCOPY      HX GASTRIC BYPASS      HX GYN      btl and partial hysterectomy    HX HYSTERECTOMY Sept?  2020    [facility] w/ Dr Kelley Grijalva  11/30/2020    HX OOPHORECTOMY      OR BREAST SURGERY PROCEDURE UNLISTED      reduction       Current Outpatient Medications   Medication Sig Dispense Refill    phentermine (ADIPEX-P) 37.5 mg tablet Take a half tablet by mouth in morning for one week. Add second half tablet after first week if needing increased suppression, but no later than noon to avoid insomnia. 30 Tablet 0    amLODIPine (NORVASC) 10 mg tablet Take 1 Tablet by mouth daily. 90 Tablet 0    zolpidem (AMBIEN) 5 mg tablet Take 1 Tablet by mouth nightly as needed for Sleep. Max Daily Amount: 5 mg. 30 Tablet 0    Biotin 2,500 mcg cap Take  by mouth.  cholecalciferol (VITAMIN D3) (5000 Units/125 mcg) tab tablet Take 5,000 Units by mouth daily.  thiamine HCL (Vitamin B-1) 100 mg tablet Take 100 mg by mouth daily.  cyanocobalamin 1,000 mcg tablet Take 1,000 mcg by mouth daily.  calcium citrate 200 mg (950 mg) tablet Take 500 mg by mouth three (3) times daily.  multivitamin capsule Take 1 Cap by mouth two (2) times a day. flinestones vitamin          Allergies   Allergen Reactions    Lisinopril Other (comments)     Swollen lips       ROS:  Review of Systems   Constitutional: Positive for weight loss. Negative for malaise/fatigue. Eyes: Negative. Respiratory: Negative for cough and shortness of breath. Cardiovascular: Negative for chest pain, palpitations and leg swelling. Gastrointestinal: Positive for abdominal pain. Negative for constipation, diarrhea, heartburn, nausea and vomiting. Genitourinary: Negative. Skin: Negative. Neurological: Negative for dizziness, tingling, loss of consciousness, weakness and headaches.          Physicial Exam:  Visit Vitals  /83 (BP 1 Location: Left upper arm, BP Patient Position: Sitting)   Pulse 84   Temp 98 °F (36.7 °C)   Ht 5' (1.524 m)   Wt 107 kg (236 lb)   LMP 07/16/2020   SpO2 100%   BMI 46.09 kg/m²     Physical Exam  Vitals and nursing note reviewed. Constitutional:       Appearance: She is obese. HENT:      Head: Normocephalic and atraumatic. Cardiovascular:      Rate and Rhythm: Normal rate. Pulses: Normal pulses. Heart sounds: Normal heart sounds. Pulmonary:      Effort: Pulmonary effort is normal.      Breath sounds: Normal breath sounds. Abdominal:      General: Bowel sounds are normal. There is no distension. Palpations: Abdomen is soft. There is no mass. Tenderness: There is abdominal tenderness (RLQ). Hernia: No hernia is present. Musculoskeletal:         General: Normal range of motion. Skin:     General: Skin is warm and dry. Neurological:      General: No focal deficit present. Mental Status: She is alert and oriented to person, place, and time. Psychiatric:         Mood and Affect: Mood normal.         Behavior: Behavior normal.         Labs:   No results found for this or any previous visit (from the past 672 hour(s)). Assessment/Plan:   She is currently 11 months s/p laparoscopic gastric bypass surgery with a total weight loss of 73 lbs to date, doing well but less than anticipated weight loss. Pt reports sticking with bariatric diet and denies snacking. Admits to lack of PA and will increase this. She requests assistance to help with slow weight loss. Pt desires to start phentermine--reviewed potential SE including: elevated HR, BP, increased anxiety, insomnia, constipation  Pt to start with 37.5mg tablet--1/2 tab early am--add 2nd half after 1st week if needing increased suppression, but no later than noon to avoid insomnia. Gave 30 tabs, no RF--pt understands will need monthly visits  Labs were not completed prior to visit and pt was instructed to continue MVI/B1/B12/D supplementation. Will send Wee Web message when resulted. US to eval for RLQ pain and rule out hernias.    We have reviewed the components of a successful postoperative course including requirement for a high protein, low carbohydrate diet, 64 oz a day of zero calorie liquids, daily vitamin supplementation, daily exercise (150 mis/week), regular follow-up, and participation in support groups. Refer to registered dietitian for more detailed medical nutrition therapy as needed. The primary encounter diagnosis was Post-resection malabsorption. Diagnoses of S/P gastric bypass, Morbid obesity ([facility] [address] BMI 45.0-49.9, adult ([facility] [address] and Encounter for weight loss counseling were also pertinent to this visit. Follow-up and Dispositions    · Return in about 4 weeks (around 12/6/2021).        Vahid Hooker NP

## 2021-11-12 ENCOUNTER — HOSPITAL ENCOUNTER (OUTPATIENT)
Dept: LAB | Age: 46
Discharge: HOME | End: 2021-11-12
Attending: NURSE PRACTITIONER
Payer: MEDICAID

## 2021-11-12 ENCOUNTER — HOSPITAL ENCOUNTER (OUTPATIENT)
Dept: ULTRASOUND IMAGING | Age: 46
Discharge: HOME | End: 2021-11-12
Attending: NURSE PRACTITIONER
Payer: MEDICAID

## 2021-11-12 DIAGNOSIS — K91.2 POSTOPERATIVE MALABSORPTION: ICD-10-CM

## 2021-11-12 DIAGNOSIS — K91.2 POST-RESECTION MALABSORPTION: ICD-10-CM

## 2021-11-12 DIAGNOSIS — Z71.3 ENCOUNTER FOR WEIGHT LOSS COUNSELING: ICD-10-CM

## 2021-11-12 DIAGNOSIS — E66.01 MORBID OBESITY (HCC): ICD-10-CM

## 2021-11-12 DIAGNOSIS — Z98.84 S/P GASTRIC BYPASS: ICD-10-CM

## 2021-11-12 LAB
25(OH)D3 SERPL-MCNC: 37.6 NG/ML (ref 30–100)
ALBUMIN SERPL-MCNC: 3.8 G/DL (ref 3.5–4.7)
ALBUMIN/GLOB SERPL: 1.1 {RATIO}
ALP SERPL-CCNC: 27 U/L (ref 38–126)
ALT SERPL-CCNC: 14 U/L (ref 3–52)
ANION GAP SERPL CALC-SCNC: 7 MMOL/L
AST SERPL W P-5'-P-CCNC: 14 U/L (ref 14–74)
BASOPHILS # BLD: 0 K/UL (ref 0–0.1)
BASOPHILS NFR BLD: 1 % (ref 0–2)
BILIRUB SERPL-MCNC: 0.6 MG/DL (ref 0.2–1)
BUN SERPL-MCNC: 11 MG/DL (ref 9–21)
BUN/CREAT SERPL: 18
CA-I BLD-MCNC: 9.2 MG/DL (ref 8.5–10.5)
CHLORIDE SERPL-SCNC: 105 MMOL/L (ref 94–111)
CO2 SERPL-SCNC: 27 MMOL/L (ref 21–33)
CREAT SERPL-MCNC: 0.6 MG/DL (ref 0.7–1.2)
DIFFERENTIAL METHOD BLD: ABNORMAL
EOSINOPHIL # BLD: 0.1 K/UL (ref 0–0.4)
EOSINOPHIL NFR BLD: 1 % (ref 0–5)
ERYTHROCYTE [DISTWIDTH] IN BLOOD BY AUTOMATED COUNT: 11.6 % (ref 11.6–14.5)
FERRITIN SERPL-MCNC: 50 NG/ML (ref 8–388)
FOLATE SERPL-MCNC: 17.1 NG/ML (ref 3.1–17.5)
GLOBULIN SER CALC-MCNC: 3.6 G/DL
GLUCOSE SERPL-MCNC: 83 MG/DL (ref 70–110)
HCT VFR BLD AUTO: 39 % (ref 35–45)
HGB BLD-MCNC: 12.8 G/DL (ref 12–16)
IMM GRANULOCYTES # BLD AUTO: 0 K/UL (ref 0–0.04)
IMM GRANULOCYTES NFR BLD AUTO: 0 % (ref 0–0.5)
IRON SERPL-MCNC: 72 UG/DL (ref 50–175)
LYMPHOCYTES # BLD: 3.4 K/UL (ref 0.9–3.6)
LYMPHOCYTES NFR BLD: 45 % (ref 21–52)
MCH RBC QN AUTO: 31.4 PG (ref 24–34)
MCHC RBC AUTO-ENTMCNC: 32.8 G/DL (ref 31–37)
MCV RBC AUTO: 95.8 FL (ref 78–100)
MONOCYTES # BLD: 0.6 K/UL (ref 0.05–1.2)
MONOCYTES NFR BLD: 7 % (ref 3–10)
NEUTS SEG # BLD: 3.5 K/UL (ref 1.8–8)
NEUTS SEG NFR BLD: 46 % (ref 40–73)
NRBC # BLD: 0 K/UL (ref 0–0.01)
NRBC BLD-RTO: 0 PER 100 WBC
PLATELET # BLD AUTO: 336 K/UL (ref 135–420)
PMV BLD AUTO: 9.8 FL (ref 9.2–11.8)
POTASSIUM SERPL-SCNC: 3.7 MMOL/L (ref 3.2–5.1)
PROT SERPL-MCNC: 7.4 G/DL (ref 6.1–8.4)
RBC # BLD AUTO: 4.07 M/UL (ref 4.2–5.3)
SODIUM SERPL-SCNC: 139 MMOL/L (ref 135–145)
VIT B12 SERPL-MCNC: 1154 PG/ML (ref 211–911)
WBC # BLD AUTO: 7.6 K/UL (ref 4.6–13.2)

## 2021-11-12 PROCEDURE — 82607 VITAMIN B-12: CPT

## 2021-11-12 PROCEDURE — 84425 ASSAY OF VITAMIN B-1: CPT

## 2021-11-12 PROCEDURE — 36415 COLL VENOUS BLD VENIPUNCTURE: CPT

## 2021-11-12 PROCEDURE — 80053 COMPREHEN METABOLIC PANEL: CPT

## 2021-11-12 PROCEDURE — 76705 ECHO EXAM OF ABDOMEN: CPT

## 2021-11-12 PROCEDURE — 82728 ASSAY OF FERRITIN: CPT

## 2021-11-12 PROCEDURE — 83540 ASSAY OF IRON: CPT

## 2021-11-12 PROCEDURE — 85025 COMPLETE CBC W/AUTO DIFF WBC: CPT

## 2021-11-12 PROCEDURE — 82306 VITAMIN D 25 HYDROXY: CPT

## 2021-11-19 LAB — VIT B1 BLD-SCNC: 205.9 NMOL/L (ref 66.5–200)

## 2022-03-18 PROBLEM — E66.01 MORBID OBESITY WITH BMI OF 60.0-69.9, ADULT (HCC): Status: ACTIVE | Noted: 2020-11-30

## 2022-03-19 PROBLEM — M54.42 ACUTE LEFT-SIDED LOW BACK PAIN WITH LEFT-SIDED SCIATICA: Status: ACTIVE | Noted: 2021-03-01

## 2022-03-19 PROBLEM — E87.6 HYPOKALEMIA: Status: ACTIVE | Noted: 2020-09-08

## 2022-03-19 PROBLEM — E66.01 OBESITY, MORBID (HCC): Status: ACTIVE | Noted: 2020-06-12

## 2022-03-20 PROBLEM — Z98.84 BARIATRIC SURGERY STATUS: Status: ACTIVE | Noted: 2021-03-01

## 2022-03-21 ENCOUNTER — HOSPITAL ENCOUNTER (EMERGENCY)
Age: 47
Discharge: HOME | End: 2022-03-21
Attending: STUDENT IN AN ORGANIZED HEALTH CARE EDUCATION/TRAINING PROGRAM
Payer: MEDICAID

## 2022-03-21 VITALS
RESPIRATION RATE: 18 BRPM | OXYGEN SATURATION: 99 % | DIASTOLIC BLOOD PRESSURE: 84 MMHG | BODY MASS INDEX: 47.12 KG/M2 | HEART RATE: 100 BPM | TEMPERATURE: 98.4 F | HEIGHT: 60 IN | SYSTOLIC BLOOD PRESSURE: 164 MMHG | WEIGHT: 240 LBS

## 2022-03-21 DIAGNOSIS — M25.561 ACUTE PAIN OF RIGHT KNEE: Primary | ICD-10-CM

## 2022-03-21 PROCEDURE — 74011250637 HC RX REV CODE- 250/637: Performed by: STUDENT IN AN ORGANIZED HEALTH CARE EDUCATION/TRAINING PROGRAM

## 2022-03-21 PROCEDURE — 74011000250 HC RX REV CODE- 250: Performed by: STUDENT IN AN ORGANIZED HEALTH CARE EDUCATION/TRAINING PROGRAM

## 2022-03-21 PROCEDURE — 99283 EMERGENCY DEPT VISIT LOW MDM: CPT

## 2022-03-21 RX ORDER — LIDOCAINE 4 G/100G
1 PATCH TOPICAL ONCE
Status: DISCONTINUED | OUTPATIENT
Start: 2022-03-21 | End: 2022-03-21 | Stop reason: HOSPADM

## 2022-03-21 RX ORDER — LIDOCAINE 4 G/100G
PATCH TOPICAL
Qty: 15 EACH | Refills: 1 | Status: SHIPPED | OUTPATIENT
Start: 2022-03-21

## 2022-03-21 RX ORDER — ACETAMINOPHEN 500 MG
1000 TABLET ORAL ONCE
Status: COMPLETED | OUTPATIENT
Start: 2022-03-21 | End: 2022-03-21

## 2022-03-21 RX ORDER — DICLOFENAC SODIUM 10 MG/G
4 GEL TOPICAL
Qty: 450 G | Refills: 1 | Status: SHIPPED | OUTPATIENT
Start: 2022-03-21

## 2022-03-21 RX ADMIN — ACETAMINOPHEN 1000 MG: 500 TABLET ORAL at 08:12

## 2022-03-21 NOTE — DISCHARGE INSTRUCTIONS
Continue taking Tylenol extra strength as needed for pain or discomfort. Additionally use Voltaren and apply liberally over the knee to help with inflammation. Voltaren can help to calm down the inflammation as well as helping with pain. Also sent a prescription for lidocaine patches. These just help with the pain itself.

## 2022-03-21 NOTE — ED TRIAGE NOTES
Pt states she started with left knee pain yesterday, states she has arthritis in that knee and she used to get injections in her knee. Pt states she needs a knee replacement but \"its been a minute\" since she has seen him. Pt has been taking Tylenol, but cant not take NSAIDS.    Pt came to the ED \"so somebody could look at it, because it's annoying\"

## 2022-03-21 NOTE — ED PROVIDER NOTES
HPI   Patient is a 44-year-old female with a known history of arthritis who presents with right knee pain. Is bothering her for the last day. States that she has a history of arthritis and feels like past episodes. Denies any initial trauma or injury. Denies any change in her activity. Denies any fever, sweats or chills. Denies any snap cracks or pops in her knee. Denies any tingling or numbness down her leg. Has been taking Tylenol with minimal help with her symptoms. Has history of gastric bypass and cannot take NSAIDs. Past Medical History:   Diagnosis Date    Anxiety and depression     Back pain     Constipation     Diverticulitis     Fibroids     GERD (gastroesophageal reflux disease)     HTN (hypertension)     no meds    Knee pain     Menopause     Morbid obesity (HCC)     OA (osteoarthritis)     of knees       Past Surgical History:   Procedure Laterality Date    HX BREAST REDUCTION  2016?     HX COLONOSCOPY      HX GASTRIC BYPASS      HX GYN      btl and partial hysterectomy    HX HYSTERECTOMY  Sept?  2020    [facility]IE done at [facility] w/ Dr Gely Yarbrough  11/30/2020    HX OOPHORECTOMY      HI BREAST SURGERY PROCEDURE UNLISTED      reduction         Family History:   Problem Relation Age of Onset    Hypertension Mother     Hypertension Father        Social History     Socioeconomic History    Marital status:      Spouse name: Not on file    Number of children: Not on file    Years of education: Not on file    Highest education level: Not on file   Occupational History    Not on file   Tobacco Use    Smoking status: Never Smoker    Smokeless tobacco: Never Used   Vaping Use    Vaping Use: Never used   Substance and Sexual Activity    Alcohol use: Not Currently    Drug use: Not Currently    Sexual activity: Yes     Partners: Male     Birth control/protection: None   Other Topics Concern    Not on file   Social History Narrative    Not on file Social Determinants of Health     Financial Resource Strain:     Difficulty of Paying Living Expenses: Not on file   Food Insecurity:     Worried About Running Out of Food in the Last Year: Not on file    Maurizio of Food in the Last Year: Not on file   Transportation Needs:     Lack of Transportation (Medical): Not on file    Lack of Transportation (Non-Medical):  Not on file   Physical Activity:     Days of Exercise per Week: Not on file    Minutes of Exercise per Session: Not on file   Stress:     Feeling of Stress : Not on file   Social Connections:     Frequency of Communication with Friends and Family: Not on file    Frequency of Social Gatherings with Friends and Family: Not on file    Attends Yazidi Services: Not on file    Active Member of [address] [place] [address] [employer] or Organizations: Not on file    Attends Club or Organization Meetings: Not on file    Marital Status: Not on file   Intimate Partner Violence:     Fear of Current or Ex-Partner: Not on file    Emotionally Abused: Not on file    Physically Abused: Not on file    Sexually Abused: Not on file   Housing Stability:     Unable to Pay for Housing in the Last Year: Not on file    Number of [place] in the Last Year: Not on file    Unstable Housing in the Last Year: Not on file         ALLERGIES: Lisinopril    Review of Systems  Constitutional: No fever  HENT: No ear pain  Eyes: No change in vision  Respiratory: No SOB  Cardio: No chest pain  GI: No blood in stool  : No hematuria  MSK: No back pain  Skin: No rashes  Neuro: No headache    Vitals:    03/21/22 0744   BP: (!) 164/84   Pulse: 100   Resp: 18   Temp: 98.4 °F (36.9 °C)   SpO2: 99%   Weight: 108.9 kg (240 lb)   Height: 5' (1.524 m)            Physical Exam   General: No acute distress  Head: Normocephalic, atraumatic  Psych: Cooperative and alert  Eyes: No scleral icterus, normal conjunctiva  ENT: Moist oral mucosa  Neck: Supple  CV: Regular rate and rhythm, no pitting edema, palpable DP pulses  Pulm: Clear breath sounds bilaterally without any wheezing or rhonchi, normal respiratory rate  GI: Normal bowel sounds, soft, non-tender  MSK: Normal range of motion of hips knees and ankles, normal gait, no significant tenderness with patella movement, no specific bony process tenderness, step-offs or deformities, patient does have pain with movement of the knee and with ambulation. No ligamental instability of the right knee noted, no catching of the meniscus. Skin: No rashes  Neuro: Alert and conversive    MDM   Patient is a 82-year-old female who presents with acute on chronic right knee pain. Patient is not having sitting findings of trauma, infection, instability or neurovascular issues. This appears to be a flare of her known arthritis. We did discuss different treatment options for this. She will be given lidocaine patch and Tylenol here in the emergency department so the prescription for Voltaren to home with. Following up with Dr. Ileana Stevenson her PCP. Patient stable for discharge at this time. Patient is in agreement with the plan to be discharged at this time. All the patient's questions were answered. Patient was given written instructions on the diagnosis, and states understanding of the plan moving forward. We did discuss important signs and symptoms that should prompt quick return to the emergency department. Disposition: Patient was discharged home in stable condition.   They will follow up with orthopedics    Prescriptions: Voltaren, lidocaine patch    Diagnosis: Acute right knee pain, likely arthritis    Procedures

## 2022-03-23 RX ORDER — AMLODIPINE BESYLATE 10 MG/1
TABLET ORAL
Qty: 90 TABLET | Refills: 0 | Status: SHIPPED | OUTPATIENT
Start: 2022-03-23

## 2022-07-27 ENCOUNTER — OFFICE VISIT (OUTPATIENT)
Dept: SURGERY | Age: 47
End: 2022-07-27
Payer: MEDICAID

## 2022-07-27 VITALS
TEMPERATURE: 97.6 F | BODY MASS INDEX: 47.12 KG/M2 | HEIGHT: 60 IN | OXYGEN SATURATION: 98 % | WEIGHT: 240 LBS | HEART RATE: 90 BPM | RESPIRATION RATE: 16 BRPM

## 2022-07-27 DIAGNOSIS — G89.29 CHRONIC RLQ PAIN: Primary | ICD-10-CM

## 2022-07-27 DIAGNOSIS — Z98.84 WEIGHT GAIN STATUS POST GASTRIC BYPASS: ICD-10-CM

## 2022-07-27 DIAGNOSIS — R10.31 CHRONIC RLQ PAIN: Primary | ICD-10-CM

## 2022-07-27 DIAGNOSIS — E66.01 MORBID OBESITY (HCC): ICD-10-CM

## 2022-07-27 DIAGNOSIS — Z98.84 S/P GASTRIC BYPASS: ICD-10-CM

## 2022-07-27 DIAGNOSIS — Z71.3 ENCOUNTER FOR WEIGHT LOSS COUNSELING: ICD-10-CM

## 2022-07-27 DIAGNOSIS — K91.2 POST-RESECTION MALABSORPTION: ICD-10-CM

## 2022-07-27 DIAGNOSIS — R63.5 WEIGHT GAIN STATUS POST GASTRIC BYPASS: ICD-10-CM

## 2022-07-27 PROCEDURE — 99214 OFFICE O/P EST MOD 30 MIN: CPT | Performed by: NURSE PRACTITIONER

## 2022-07-27 NOTE — PROGRESS NOTES
Bariatric Postoperative Progress Note    CC: Abd Pain/Back on Track    Nakul Foreman is a 42 y.o. female now 1.5 years status post laparoscopic gastric bypass surgery performed on 11/30/2020. Doing well overall. Currently eating protein shake, salad, fish, backed chicken, watermelon, melon, cabbage, cammie greens, boiled and scrambled eggs. Taking in 64oz water,  40-60g protein. 15 min of activity 7 days a week. Bowel movements are alternating constipation and regularity. She is compliant with multivitamins, calcium, Vit D and B12 supplements. Bad eating habits returned and is eating out often. Would like assistance to get back on track. Still experiencing RLQ pain that was reported at last visit in November. Abd US was normal, pt requesting further evaluation. ETOH (vodka couple times month), denies tobacco and NSAIDs. Weight Loss Metrics 7/27/2022 7/27/2022 3/21/2022 11/8/2021 11/8/2021 10/29/2021 9/29/2021   Pre op / Initial Wt 309 - - 309 - - -   Today's Wt - 240 lb 240 lb - 236 lb 235 lb 235 lb   BMI - 46.87 kg/m2 46.87 kg/m2 - 46.09 kg/m2 45.9 kg/m2 45.9 kg/m2   Ideal Body Wt 119 - - 119 - - -   Excess Body Wt 190 - - 190 - - -   Goal Wt 157 - - 157 - - -   Wt loss to date 69 - - 73 - - -   % Wt Loss 0.45 - - 0.48 - - -   80%  - - 152 - - -     Past Medical History:   Diagnosis Date    Anxiety and depression     Back pain     Constipation     Diverticulitis     Fibroids     GERD (gastroesophageal reflux disease)     HTN (hypertension)     no meds    Knee pain     Menopause     Morbid obesity (HCC)     OA (osteoarthritis)     of knees       Past Surgical History:   Procedure Laterality Date    HX BREAST REDUCTION  2016?     HX COLONOSCOPY      HX GASTRIC BYPASS      HX GYN      btl and partial hysterectomy    HX HYSTERECTOMY  Sept?  2020    [facility] Dr Glen Pabon  11/30/2020    HX OOPHORECTOMY      [address] UNLISTED      reduction Current Outpatient Medications   Medication Sig Dispense Refill    amLODIPine (NORVASC) 10 mg tablet take 1 tablet by mouth once daily 90 Tablet 0    diclofenac (VOLTAREN) 1 % gel Apply 4 g to affected area four (4) times daily as needed for Pain. 450 g 1    lidocaine 4 % patch Apply to area of discomfort every 12 hours as needed. 15 Each 1    zolpidem (AMBIEN) 5 mg tablet Take 1 Tablet by mouth nightly as needed for Sleep. Max Daily Amount: 5 mg. 30 Tablet 0    Biotin 2,500 mcg cap Take  by mouth. cholecalciferol (VITAMIN D3) (5000 Units/125 mcg) tab tablet Take 5,000 Units by mouth daily. thiamine HCL (B-1) 100 mg tablet Take 100 mg by mouth daily. cyanocobalamin 1,000 mcg tablet Take 1,000 mcg by mouth daily. calcium citrate 200 mg (950 mg) tablet Take 500 mg by mouth three (3) times daily. multivitamin capsule Take 1 Cap by mouth two (2) times a day. flinestones vitamin          Allergies   Allergen Reactions    Lisinopril Other (comments)     Swollen lips       ROS:  Review of Systems   Constitutional:  Negative for malaise/fatigue and weight loss. Eyes: Negative. Respiratory: Negative. Cardiovascular:  Positive for palpitations (after eating occasionally). Negative for chest pain. Gastrointestinal:  Positive for abdominal pain and constipation. Negative for blood in stool, diarrhea, heartburn, melena, nausea and vomiting. Skin:  Negative for itching and rash. Neurological:  Negative for dizziness, tingling, loss of consciousness, weakness and headaches. Physicial Exam:  Visit Vitals  Pulse 90   Temp 97.6 °F (36.4 °C) (Temporal)   Resp 16   Ht 5' (1.524 m)   Wt 108.9 kg (240 lb)   LMP 07/16/2020   SpO2 98%   BMI 46.87 kg/m²     Physical Exam  Vitals and nursing note reviewed. Constitutional:       Appearance: She is obese. HENT:      Head: Normocephalic and atraumatic. Cardiovascular:      Rate and Rhythm: Normal rate.       Heart sounds: Normal heart sounds. Pulmonary:      Effort: Pulmonary effort is normal.      Breath sounds: Normal breath sounds. Abdominal:      General: Bowel sounds are normal. There is no distension. Palpations: Abdomen is soft. There is no mass. Tenderness: There is abdominal tenderness (RLQ). Hernia: No hernia is present. Musculoskeletal:         General: Normal range of motion. Skin:     General: Skin is warm and dry. Neurological:      General: No focal deficit present. Mental Status: She is alert and oriented to person, place, and time. Psychiatric:         Mood and Affect: Mood normal.         Behavior: Behavior normal.       Labs:   No results found for this or any previous visit (from the past 672 hour(s)). Assessment/Plan:   She is currently 1.5 years s/p laparoscopic gastric bypass surgery with a total weight loss of 69 lbs to date, struggling with weight regain. Long discussion regarding high density carbohydrates and their impacts to weight regain, hunger, cravings and reactive hypoglycemia. Back on Track packet discussed and given to pt. Goals for next visit:  -5 lbs, no eating out more than once weekly, increase activity. Will order CT to further evaluate RLQ pain. Due for annual bariatric labs in November. We have reviewed the components of a successful postoperative course including requirement for a high protein, low carbohydrate diet, 64 oz a day of zero calorie liquids, daily vitamin supplementation, daily exercise (150 mis/week), regular follow-up, and participation in support groups. Refer to registered dietitian for more detailed medical nutrition therapy as needed. The primary encounter diagnosis was Chronic RLQ pain. Diagnoses of Post-resection malabsorption, S/P gastric bypass, Morbid obesity ([address] BMI 45.0-49.9, adult ([address] Encounter for weight loss counseling, and Weight gain status post gastric bypass were also pertinent to this visit.      Follow-up and Dispositions    Return in about 4 weeks (around 8/24/2022) for Weight Check. sooner as needed.   Vahid Hooker NP

## 2022-08-09 ENCOUNTER — HOSPITAL ENCOUNTER (OUTPATIENT)
Dept: CT IMAGING | Age: 47
Discharge: HOME | End: 2022-08-09
Attending: NURSE PRACTITIONER
Payer: MEDICAID

## 2022-08-09 DIAGNOSIS — E66.01 MORBID OBESITY (HCC): ICD-10-CM

## 2022-08-09 DIAGNOSIS — G89.29 CHRONIC RLQ PAIN: ICD-10-CM

## 2022-08-09 DIAGNOSIS — Z98.84 S/P GASTRIC BYPASS: ICD-10-CM

## 2022-08-09 DIAGNOSIS — Z71.3 ENCOUNTER FOR WEIGHT LOSS COUNSELING: ICD-10-CM

## 2022-08-09 DIAGNOSIS — R10.31 CHRONIC RLQ PAIN: ICD-10-CM

## 2022-08-09 DIAGNOSIS — K91.2 POST-RESECTION MALABSORPTION: ICD-10-CM

## 2022-08-09 PROCEDURE — 74011000636 HC RX REV CODE- 636: Performed by: NURSE PRACTITIONER

## 2022-08-09 PROCEDURE — 74178 CT ABD&PLV WO CNTR FLWD CNTR: CPT

## 2022-08-09 PROCEDURE — 74011000250 HC RX REV CODE- 250: Performed by: NURSE PRACTITIONER

## 2022-08-09 RX ORDER — BARIUM SULFATE 20 MG/ML
450 SUSPENSION ORAL
Status: COMPLETED | OUTPATIENT
Start: 2022-08-09 | End: 2022-08-09

## 2022-08-09 RX ADMIN — BARIUM SULFATE 450 ML: 20 SUSPENSION ORAL at 06:45

## 2022-08-09 RX ADMIN — IOPAMIDOL 97 ML: 755 INJECTION, SOLUTION INTRAVENOUS at 10:01

## 2022-08-26 ENCOUNTER — OFFICE VISIT (OUTPATIENT)
Dept: SURGERY | Age: 47
End: 2022-08-26
Payer: MEDICAID

## 2022-08-26 VITALS
BODY MASS INDEX: 46.92 KG/M2 | RESPIRATION RATE: 16 BRPM | DIASTOLIC BLOOD PRESSURE: 69 MMHG | SYSTOLIC BLOOD PRESSURE: 107 MMHG | HEIGHT: 60 IN | HEART RATE: 82 BPM | TEMPERATURE: 97.3 F | OXYGEN SATURATION: 100 % | WEIGHT: 239 LBS

## 2022-08-26 DIAGNOSIS — K91.2 POST-RESECTION MALABSORPTION: Primary | ICD-10-CM

## 2022-08-26 DIAGNOSIS — Z98.84 S/P GASTRIC BYPASS: ICD-10-CM

## 2022-08-26 DIAGNOSIS — Z71.3 ENCOUNTER FOR WEIGHT LOSS COUNSELING: ICD-10-CM

## 2022-08-26 DIAGNOSIS — E66.01 MORBID OBESITY (HCC): ICD-10-CM

## 2022-08-26 PROCEDURE — 99212 OFFICE O/P EST SF 10 MIN: CPT | Performed by: NURSE PRACTITIONER

## 2022-08-26 NOTE — PROGRESS NOTES
Bariatric Postoperative Progress Note    CC: Weight Management    Asim Mera is a 41 y.o. female now 20 months status post laparoscopic gastric bypass surgery performed on 11/30/2020.  -1 lb  Doing well overall. Currently eating scambled eggs, turkey frank, cheese stick with turkey, tuna fish, salad, baked chicken, fish, cucumbers, broccoli, grapes, bananas, honeydew. Taking in 64oz water,  60g protein. 0 min of activity 0 days a week. Bowel movements are regular. She is compliant with multivitamins, calcium, Vit D and B12 supplements. Decreased eating out, but has not increased activity. 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 Weight Loss Metrics 8/26/2022 8/26/2022 7/27/2022 7/27/2022 3/21/2022 11/8/2021 11/8/2021   Pre op / Initial Wt 309 - 309 - - 309 -   Today's Wt - 239 lb - 240 lb 240 lb - 236 lb   BMI - 46.68 kg/m2 - 46.87 kg/m2 46.87 kg/m2 - 46.09 kg/m2   Ideal Body Wt 119 - 119 - - 119 -   Excess Body Wt 190 - 190 - - 190 -   Goal Wt 157 - 157 - - 157 -   Wt loss to date 70 - 69 - - 73 -   % Wt Loss 0.46 - 0.45 - - 0.48 -   80%  - 152 - - 152 -         Past Medical History:   Diagnosis Date    Anxiety and depression     Back pain     Constipation     Diverticulitis     Fibroids     GERD (gastroesophageal reflux disease)     HTN (hypertension)     no meds    Knee pain     Menopause     Morbid obesity (HCC)     OA (osteoarthritis)     of knees       Past Surgical History:   Procedure Laterality Date    HX BREAST REDUCTION  2016?     HX COLONOSCOPY      HX GASTRIC BYPASS      HX GYN      btl and partial hysterectomy    HX HYSTERECTOMY  Sept?  2020    JOANIE done at [facility] w/ Dr Gwendolyn Norris  11/30/2020    HX OOPHORECTOMY      [place] BREAST SURGERY PROCEDURE UNLISTED      reduction       Current Outpatient Medications   Medication Sig Dispense Refill    amLODIPine (NORVASC) 10 mg tablet take 1 tablet by mouth once daily 90 Tablet 0    diclofenac (VOLTAREN) 1 % gel Apply 4 g to affected area four (4) times daily as needed for Pain. 450 g 1    lidocaine 4 % patch Apply to area of discomfort every 12 hours as needed. 15 Each 1    zolpidem (AMBIEN) 5 mg tablet Take 1 Tablet by mouth nightly as needed for Sleep. Max Daily Amount: 5 mg. 30 Tablet 0    Biotin 2,500 mcg cap Take  by mouth. cholecalciferol (VITAMIN D3) (5000 Units/125 mcg) tab tablet Take 5,000 Units by mouth daily. thiamine HCL (B-1) 100 mg tablet Take 100 mg by mouth daily. cyanocobalamin 1,000 mcg tablet Take 1,000 mcg by mouth daily. calcium citrate 200 mg (950 mg) tablet Take 500 mg by mouth three (3) times daily. multivitamin capsule Take 1 Cap by mouth two (2) times a day. flinestones vitamin          Allergies   Allergen Reactions    Lisinopril Other (comments)     Swollen lips       ROS:  Review of Systems   Constitutional:  Negative for malaise/fatigue and weight loss. Respiratory: Negative. Cardiovascular: Negative. Gastrointestinal: Negative. Skin: Negative. Neurological: Negative. Physicial Exam:  Visit Vitals  /69   Pulse 82   Temp 97.3 °F (36.3 °C) (Temporal)   Resp 16   Ht 5' (1.524 m)   Wt 108.4 kg (239 lb)   LMP 07/16/2020   SpO2 100%   BMI 46.68 kg/m²     Physical Exam  Vitals and nursing note reviewed. Constitutional:       Appearance: She is obese. HENT:      Head: Normocephalic and atraumatic. Pulmonary:      Effort: Pulmonary effort is normal.   Musculoskeletal:         General: Normal range of motion. Neurological:      General: No focal deficit present. Mental Status: She is alert and oriented to person, place, and time. Psychiatric:         Mood and Affect: Mood normal.         Behavior: Behavior normal.       Labs:   No results found for this or any previous visit (from the past 672 hour(s)).     Assessment/Plan:   She is currently 20 months s/p laparoscopic gastric bypass surgery with a total weight loss of 70 lbs to date, struggling with weight loss plateau. Discussed ways to increase activity while she is working such as chair exercise and stairs. She starts her shifts at 0900, discussed possibly getting up earlier and going for a walk. She is able to keep food at her pt's homes, discussed to bring healthy food options and limit snacking/grazing during her shifts. If able to make above changes, may consider adding on phentermine. Due for annual bariatric labs in November. We have reviewed the components of a successful postoperative course including requirement for a high protein, low carbohydrate diet, 64 oz a day of zero calorie liquids, daily vitamin supplementation, daily exercise (150 mis/week), regular follow-up, and participation in support groups. Refer to registered dietitian for more detailed medical nutrition therapy as needed. The primary encounter diagnosis was Post-resection malabsorption. Diagnoses of S/P gastric bypass, Morbid obesity ([facility] [address] BMI 45.0-49.9, adult ([facility] [address] and Encounter for weight loss counseling were also pertinent to this visit. Follow-up and Dispositions    Return in about 4 weeks (around 9/23/2022) for Weight Check. sooner as needed.   Luci Womack NP

## 2022-09-26 ENCOUNTER — HOSPITAL ENCOUNTER (OUTPATIENT)
Dept: LAB | Age: 47
Discharge: HOME | End: 2022-09-26
Payer: MEDICAID

## 2022-09-26 ENCOUNTER — OFFICE VISIT (OUTPATIENT)
Dept: SURGERY | Age: 47
End: 2022-09-26
Payer: MEDICAID

## 2022-09-26 VITALS
WEIGHT: 243 LBS | RESPIRATION RATE: 18 BRPM | TEMPERATURE: 98.3 F | DIASTOLIC BLOOD PRESSURE: 90 MMHG | OXYGEN SATURATION: 100 % | HEIGHT: 60 IN | SYSTOLIC BLOOD PRESSURE: 138 MMHG | BODY MASS INDEX: 47.71 KG/M2 | HEART RATE: 74 BPM

## 2022-09-26 DIAGNOSIS — K91.2 POST-RESECTION MALABSORPTION: ICD-10-CM

## 2022-09-26 DIAGNOSIS — E66.01 MORBID OBESITY (HCC): ICD-10-CM

## 2022-09-26 DIAGNOSIS — Z98.84 S/P GASTRIC BYPASS: ICD-10-CM

## 2022-09-26 DIAGNOSIS — Z71.3 ENCOUNTER FOR WEIGHT LOSS COUNSELING: ICD-10-CM

## 2022-09-26 DIAGNOSIS — K91.2 POST-RESECTION MALABSORPTION: Primary | ICD-10-CM

## 2022-09-26 DIAGNOSIS — T73.0XXA HUNGRY, INITIAL ENCOUNTER: ICD-10-CM

## 2022-09-26 LAB
ATRIAL RATE: 75 BPM
CALCULATED P AXIS, ECG09: 32 DEGREES
CALCULATED R AXIS, ECG10: -10 DEGREES
CALCULATED T AXIS, ECG11: 3 DEGREES
DIAGNOSIS, 93000: NORMAL
P-R INTERVAL, ECG05: 176 MS
Q-T INTERVAL, ECG07: 408 MS
QRS DURATION, ECG06: 82 MS
QTC CALCULATION (BEZET), ECG08: 455 MS
VENTRICULAR RATE, ECG03: 75 BPM

## 2022-09-26 PROCEDURE — 93005 ELECTROCARDIOGRAM TRACING: CPT

## 2022-09-26 PROCEDURE — 99212 OFFICE O/P EST SF 10 MIN: CPT | Performed by: NURSE PRACTITIONER

## 2022-09-26 RX ORDER — PHENTERMINE HYDROCHLORIDE 37.5 MG/1
TABLET ORAL
Qty: 30 TABLET | Refills: 0 | Status: SHIPPED | OUTPATIENT
Start: 2022-09-26 | End: 2022-10-26 | Stop reason: SDUPTHER

## 2022-09-26 NOTE — PROGRESS NOTES
Bariatric Postoperative Progress Note    CC: Weight Management    Fortunato Hickey is a 41 y.o. female now 2 years status post laparoscopic gastric bypass surgery performed on 9/23/2020.  +4 lbs  Doing well overall. Currently eating eggs, turkey frank, turkey burger, chicken. Taking in 64 oz water,  30g protein. 210 min of activity throughout the week. Bowel movements are alternating constipation and regularity. She is compliant with multivitamins, calcium, Vit D and B12 supplements. Increased exercise since last visit, going to the gym 2-3 days weekly. Reports she is hungry within 20-30 minutes after eating meals which will lead to snacking. Trying to focus on protein and making better choices, but admits she might eat too quickly. Weight Loss Metrics 9/26/2022 9/26/2022 8/26/2022 8/26/2022 7/27/2022 7/27/2022 3/21/2022   Pre op / Initial Wt 309 - 309 - 309 - -   Today's Wt - 243 lb - 239 lb - 240 lb 240 lb   BMI - 47.46 kg/m2 - 46.68 kg/m2 - 46.87 kg/m2 46.87 kg/m2   Ideal Body Wt 119 - 119 - 119 - -   Excess Body Wt 190 - 190 - 190 - -   Goal Wt 157 - 157 - 157 - -   Wt loss to date 66 - 70 - 69 - -   % Wt Loss 0.43 - 0.46 - 0.45 - -   80%  - 152 - 152 - -         Past Medical History:   Diagnosis Date    Anxiety and depression     Back pain     Constipation     Diverticulitis     Fibroids     GERD (gastroesophageal reflux disease)     HTN (hypertension)     no meds    Knee pain     Menopause     Morbid obesity (HCC)     OA (osteoarthritis)     of knees       Past Surgical History:   Procedure Laterality Date    HX BREAST REDUCTION  2016?     HX COLONOSCOPY      HX GASTRIC BYPASS      HX GYN      btl and partial hysterectomy    HX HYSTERECTOMY  Sept?  2020    JOANIE done at [facility] w/ Dr Charlotte Berumen    HX [address]  11/30/2020    HX OOPHORECTOMY      [place] BREAST SURGERY PROCEDURE UNLISTED      reduction       Current Outpatient Medications   Medication Sig Dispense Refill    phentermine (ADIPEX-P) 37.5 mg tablet Take half tab PO once daily in morning for one week. Can take other half tab around lunchtime during second week if additional appetite suppression is needed. 30 Tablet 0    amLODIPine (NORVASC) 10 mg tablet take 1 tablet by mouth once daily 90 Tablet 0    zolpidem (AMBIEN) 5 mg tablet Take 1 Tablet by mouth nightly as needed for Sleep. Max Daily Amount: 5 mg. 30 Tablet 0    Biotin 2,500 mcg cap Take  by mouth. cholecalciferol (VITAMIN D3) (5000 Units/125 mcg) tab tablet Take 5,000 Units by mouth daily. thiamine HCL (B-1) 100 mg tablet Take 100 mg by mouth daily. cyanocobalamin 1,000 mcg tablet Take 1,000 mcg by mouth daily. calcium citrate 200 mg (950 mg) tablet Take 500 mg by mouth three (3) times daily. multivitamin capsule Take 1 Cap by mouth two (2) times a day. flinestones vitamin       diclofenac (VOLTAREN) 1 % gel Apply 4 g to affected area four (4) times daily as needed for Pain. (Patient not taking: Reported on 9/26/2022) 450 g 1    lidocaine 4 % patch Apply to area of discomfort every 12 hours as needed. (Patient not taking: Reported on 9/26/2022) 15 Each 1       Allergies   Allergen Reactions    Lisinopril Other (comments)     Swollen lips       ROS:  Review of Systems   Constitutional:  Negative for malaise/fatigue and weight loss. Respiratory: Negative. Cardiovascular: Negative. Gastrointestinal: Negative. Hunger   Neurological: Negative. Physicial Exam:  Visit Vitals  BP (!) 138/90   Pulse 74   Temp 98.3 °F (36.8 °C)   Resp 18   Ht 5' (1.524 m)   Wt 110.2 kg (243 lb)   SpO2 100%   BMI 47.46 kg/m²     Ms. Mary Ann Almaguer has been given the following recommendations today due to her elevated BP reading: referred to Alternative/PCP. Physical Exam  Vitals and nursing note reviewed. Constitutional:       Appearance: She is obese. HENT:      Head: Normocephalic and atraumatic.    Pulmonary:      Effort: Pulmonary effort is normal. Musculoskeletal:         General: Normal range of motion. Neurological:      General: No focal deficit present. Mental Status: She is alert and oriented to person, place, and time. Psychiatric:         Mood and Affect: Mood normal.         Behavior: Behavior normal.       Labs:   Recent Results (from the past 672 hour(s))   EKG, 12 LEAD, INITIAL    Collection Time: 09/26/22  2:31 PM   Result Value Ref Range    Ventricular Rate 75 BPM    Atrial Rate 75 BPM    P-R Interval 176 ms    QRS Duration 82 ms    Q-T Interval 408 ms    QTC Calculation (Bezet) 455 ms    Calculated P Axis 32 degrees    Calculated R Axis -10 degrees    Calculated T Axis 3 degrees    Diagnosis       Normal sinus rhythm  Voltage criteria for left ventricular hypertrophy  Abnormal ECG  No previous ECGs available  Confirmed by Robert Santacruz MD, Teresa Celeste ([id number]) on 9/26/2022 3:36:27 PM         Assessment/Plan:   She is currently 2 years s/p laparoscopic gastric bypass surgery with a total weight loss of 66 lbs to date, struggling with weight regain. Due for annual bariatric labs next month, orders placed. Pt requesting pharmaceutical assistance for her chronic reoccurring obesity, frustrated with lack of weight loss even with diet and exercise changes. Increase gym to 3-5 days weekly. Work on eating more slowly, increase amounts of protein and vegetables. Pt desires to start phentermine--reviewed potential SE including: elevated HR, BP, increased anxiety, insomnia, constipation  Pt to start with 37.5mg tablet--1/2 tab early am--add 2nd half after 1st week if needing increased suppression, but no later than noon to avoid insomnia. Gave 30 tabs pending EKG, no RF--pt understands will need monthly visits  May be good candidate for GLP-1 depending on labs.    We have reviewed the components of a successful postoperative course including requirement for a high protein, low carbohydrate diet, 64 oz a day of zero calorie liquids, daily vitamin supplementation, daily exercise (150 mis/week), regular follow-up, and participation in support groups. Refer to registered dietitian for more detailed medical nutrition therapy as needed. The primary encounter diagnosis was Post-resection malabsorption. Diagnoses of S/P gastric bypass, Morbid obesity ([facility] [address] BMI 45.0-49.9, adult ([facility] [address] Encounter for weight loss counseling, and Hungry, initial encounter were also pertinent to this visit. Follow-up and Dispositions    Return in about 4 weeks (around 10/24/2022). with labs, sooner as needed.   Brianna Tee NP

## 2022-10-07 ENCOUNTER — TRANSCRIBE ORDER (OUTPATIENT)
Dept: SCHEDULING | Age: 47
End: 2022-10-07

## 2022-10-07 DIAGNOSIS — Z12.31 VISIT FOR SCREENING MAMMOGRAM: Primary | ICD-10-CM

## 2022-10-22 ENCOUNTER — HOSPITAL ENCOUNTER (OUTPATIENT)
Dept: LAB | Age: 47
Discharge: HOME | End: 2022-10-22
Payer: MEDICAID

## 2022-10-22 ENCOUNTER — PATIENT MESSAGE (OUTPATIENT)
Dept: SURGERY | Age: 47
End: 2022-10-22

## 2022-10-22 DIAGNOSIS — K91.2 POST-RESECTION MALABSORPTION: ICD-10-CM

## 2022-10-22 DIAGNOSIS — Z98.84 S/P GASTRIC BYPASS: ICD-10-CM

## 2022-10-22 DIAGNOSIS — Z71.3 ENCOUNTER FOR WEIGHT LOSS COUNSELING: ICD-10-CM

## 2022-10-22 DIAGNOSIS — E66.01 MORBID OBESITY (HCC): ICD-10-CM

## 2022-10-22 LAB
ALBUMIN SERPL-MCNC: 4 G/DL (ref 3.4–5)
ALBUMIN/GLOB SERPL: 0.9 {RATIO} (ref 0.8–1.7)
ALP SERPL-CCNC: 31 U/L (ref 45–117)
ALT SERPL-CCNC: 23 U/L (ref 13–56)
ANION GAP SERPL CALC-SCNC: 11 MMOL/L (ref 3–18)
AST SERPL W P-5'-P-CCNC: 13 U/L (ref 10–38)
BILIRUB SERPL-MCNC: 0.3 MG/DL (ref 0.2–1)
BUN SERPL-MCNC: 8 MG/DL (ref 7–18)
BUN/CREAT SERPL: 14 (ref 12–20)
CA-I BLD-MCNC: 9.9 MG/DL (ref 8.5–10.1)
CHLORIDE SERPL-SCNC: 98 MMOL/L (ref 100–111)
CO2 SERPL-SCNC: 27 MMOL/L (ref 21–32)
CREAT SERPL-MCNC: 0.57 MG/DL (ref 0.6–1.3)
ERYTHROCYTE [DISTWIDTH] IN BLOOD BY AUTOMATED COUNT: 12.2 % (ref 11.6–14.5)
GLOBULIN SER CALC-MCNC: 4.4 G/DL (ref 2–4)
GLUCOSE SERPL-MCNC: 88 MG/DL (ref 74–99)
HCT VFR BLD AUTO: 41.2 % (ref 35–45)
HGB BLD-MCNC: 13.4 G/DL (ref 12–16)
MCH RBC QN AUTO: 30.2 PG (ref 24–34)
MCHC RBC AUTO-ENTMCNC: 32.5 G/DL (ref 31–37)
MCV RBC AUTO: 92.8 FL (ref 78–100)
NRBC # BLD: 0 K/UL (ref 0–0.01)
NRBC BLD-RTO: 0 PER 100 WBC
PLATELET # BLD AUTO: 337 K/UL (ref 135–420)
PMV BLD AUTO: 9.8 FL (ref 9.2–11.8)
POTASSIUM SERPL-SCNC: 4.2 MMOL/L (ref 3.5–5.5)
PROT SERPL-MCNC: 8.4 G/DL (ref 6.4–8.2)
RBC # BLD AUTO: 4.44 M/UL (ref 4.2–5.3)
SODIUM SERPL-SCNC: 136 MMOL/L (ref 136–145)
WBC # BLD AUTO: 7.3 K/UL (ref 4.6–13.2)

## 2022-10-22 PROCEDURE — 83036 HEMOGLOBIN GLYCOSYLATED A1C: CPT

## 2022-10-22 PROCEDURE — 82728 ASSAY OF FERRITIN: CPT

## 2022-10-22 PROCEDURE — 84425 ASSAY OF VITAMIN B-1: CPT

## 2022-10-22 PROCEDURE — 82607 VITAMIN B-12: CPT

## 2022-10-22 PROCEDURE — 80053 COMPREHEN METABOLIC PANEL: CPT

## 2022-10-22 PROCEDURE — 83540 ASSAY OF IRON: CPT

## 2022-10-22 PROCEDURE — 85027 COMPLETE CBC AUTOMATED: CPT

## 2022-10-22 PROCEDURE — 82306 VITAMIN D 25 HYDROXY: CPT

## 2022-10-22 PROCEDURE — 36415 COLL VENOUS BLD VENIPUNCTURE: CPT

## 2022-10-23 LAB
25(OH)D3 SERPL-MCNC: 27.7 NG/ML (ref 30–100)
EST. AVERAGE GLUCOSE BLD GHB EST-MCNC: 111 MG/DL
FERRITIN SERPL-MCNC: 73 NG/ML (ref 8–388)
FOLATE SERPL-MCNC: 19.3 NG/ML (ref 3.1–17.5)
HBA1C MFR BLD: 5.5 % (ref 4.2–5.6)
IRON SERPL-MCNC: 68 UG/DL (ref 50–175)
VIT B12 SERPL-MCNC: 921 PG/ML (ref 211–911)

## 2022-10-26 ENCOUNTER — OFFICE VISIT (OUTPATIENT)
Dept: SURGERY | Age: 47
End: 2022-10-26
Payer: MEDICAID

## 2022-10-26 VITALS
WEIGHT: 236 LBS | DIASTOLIC BLOOD PRESSURE: 86 MMHG | HEART RATE: 78 BPM | SYSTOLIC BLOOD PRESSURE: 136 MMHG | TEMPERATURE: 98.4 F | HEIGHT: 60 IN | OXYGEN SATURATION: 99 % | BODY MASS INDEX: 46.33 KG/M2 | RESPIRATION RATE: 18 BRPM

## 2022-10-26 DIAGNOSIS — E66.01 MORBID OBESITY (HCC): ICD-10-CM

## 2022-10-26 DIAGNOSIS — K91.2 POST-RESECTION MALABSORPTION: Primary | ICD-10-CM

## 2022-10-26 DIAGNOSIS — Z71.3 ENCOUNTER FOR WEIGHT LOSS COUNSELING: ICD-10-CM

## 2022-10-26 DIAGNOSIS — Z79.899 FOLLOW-UP ENCOUNTER INVOLVING MEDICATION: ICD-10-CM

## 2022-10-26 DIAGNOSIS — Z98.84 S/P GASTRIC BYPASS: ICD-10-CM

## 2022-10-26 LAB — VIT B1 BLD-SCNC: 88.9 NMOL/L (ref 66.5–200)

## 2022-10-26 PROCEDURE — 99212 OFFICE O/P EST SF 10 MIN: CPT | Performed by: NURSE PRACTITIONER

## 2022-10-26 RX ORDER — PHENTERMINE HYDROCHLORIDE 37.5 MG/1
37.5 TABLET ORAL
Qty: 45 TABLET | Refills: 0 | Status: SHIPPED | OUTPATIENT
Start: 2022-10-26

## 2022-10-26 NOTE — PROGRESS NOTES
Bariatric Postoperative Progress Note    CC: Weight Management/Medication Follow Up    Holli Villegas is a 41 y.o. female now 2 years status post laparoscopic gastric bypass surgery performed on 9/23/2020.  -7 lbs  Doing well overall. Currently eating eggs, turkey frank, salads, perez's chili. Taking in 64 plus oz water,  unknown g protein. Very active at work with new job. Bowel movements are alternating constipation and regularity. She is compliant with multivitamins, calcium, Vit D and B12 supplements. Started new job at [employer], feels that this has really helped her to lose more weight. Doing well on the phentermine, denies any SE and desires to continue. Weight Loss Metrics 10/26/2022 10/26/2022 9/26/2022 9/26/2022 8/26/2022 8/26/2022 7/27/2022   Pre op / Initial Wt 309 - 309 - 309 - 309   Today's Wt - 236 lb - 243 lb - 239 lb -   BMI - 46.09 kg/m2 - 47.46 kg/m2 - 46.68 kg/m2 -   Ideal Body Wt 119 - 119 - 119 - 119   Excess Body Wt 190 - 190 - 190 - 190   Goal Wt 157 - 157 - 157 - 157   Wt loss to date 73 - 66 - 70 - 69   % Wt Loss 0.48 - 0.43 - 0.46 - 0.45   80%  - 152 - 152 - 152         Past Medical History:   Diagnosis Date    Anxiety and depression     Back pain     Constipation     Diverticulitis     Fibroids     GERD (gastroesophageal reflux disease)     HTN (hypertension)     no meds    Knee pain     Menopause     Morbid obesity (HCC)     OA (osteoarthritis)     of knees       Past Surgical History:   Procedure Laterality Date    HX BREAST REDUCTION  2016? HX COLONOSCOPY      HX GASTRIC BYPASS      HX GYN      btl and partial hysterectomy    HX HYSTERECTOMY  Sept?  2020    JOANIE done at [facility] w/ Dr Libertad Akers  11/30/2020    HX OOPHORECTOMY      [place] BREAST SURGERY PROCEDURE UNLISTED      reduction       Current Outpatient Medications   Medication Sig Dispense Refill    phentermine (ADIPEX-P) 37.5 mg tablet Take 1 Tablet by mouth every morning.  Max Daily Amount: 37.5 mg. 45 Tablet 0    amLODIPine (NORVASC) 10 mg tablet take 1 tablet by mouth once daily 90 Tablet 0    Biotin 2,500 mcg cap Take  by mouth. cholecalciferol (VITAMIN D3) (5000 Units/125 mcg) tab tablet Take 5,000 Units by mouth daily. thiamine HCL (B-1) 100 mg tablet Take 100 mg by mouth daily. cyanocobalamin 1,000 mcg tablet Take 1,000 mcg by mouth daily. calcium citrate 200 mg (950 mg) tablet Take 500 mg by mouth three (3) times daily. multivitamin capsule Take 1 Cap by mouth two (2) times a day. flinestones vitamin       diclofenac (VOLTAREN) 1 % gel Apply 4 g to affected area four (4) times daily as needed for Pain. (Patient not taking: No sig reported) 450 g 1    lidocaine 4 % patch Apply to area of discomfort every 12 hours as needed. (Patient not taking: No sig reported) 15 Each 1    zolpidem (AMBIEN) 5 mg tablet Take 1 Tablet by mouth nightly as needed for Sleep. Max Daily Amount: 5 mg. (Patient not taking: Reported on 10/26/2022) 30 Tablet 0       Allergies   Allergen Reactions    Lisinopril Other (comments)     Swollen lips       ROS:  Review of Systems   Constitutional:  Positive for weight loss. Negative for malaise/fatigue. Respiratory: Negative. Cardiovascular:  Negative for chest pain and palpitations. Gastrointestinal: Negative. Neurological: Negative. Physicial Exam:  Visit Vitals  /86   Pulse 78   Temp 98.4 °F (36.9 °C) (Temporal)   Resp 18   Ht 5' (1.524 m)   Wt 107 kg (236 lb)   LMP 07/16/2020   SpO2 99%   BMI 46.09 kg/m²     Physical Exam  Vitals and nursing note reviewed. Constitutional:       Appearance: She is obese. HENT:      Head: Normocephalic and atraumatic. Pulmonary:      Effort: Pulmonary effort is normal.   Musculoskeletal:         General: Normal range of motion. Neurological:      General: No focal deficit present. Mental Status: She is alert and oriented to person, place, and time.    Psychiatric: Mood and Affect: Mood normal.         Behavior: Behavior normal.       Labs:   Recent Results (from the past 672 hour(s))   CBC W/O DIFF    Collection Time: 10/22/22  9:35 AM   Result Value Ref Range    WBC 7.3 4.6 - 13.2 K/uL    RBC 4.44 4.20 - 5.30 M/uL    HGB 13.4 12.0 - 16.0 g/dL    HCT 41.2 35.0 - 45.0 %    MCV 92.8 78.0 - 100.0 FL    MCH 30.2 24.0 - 34.0 PG    MCHC 32.5 31.0 - 37.0 g/dL    RDW 12.2 11.6 - 14.5 %    PLATELET 670 872 - 021 K/uL    MPV 9.8 9.2 - 11.8 FL    NRBC 0.0 0.0  WBC    ABSOLUTE NRBC 0.00 0.00 - 0.01 K/uL   FERRITIN    Collection Time: 10/22/22  9:35 AM   Result Value Ref Range    Ferritin 73 8 - 388 ng/mL   IRON    Collection Time: 10/22/22  9:35 AM   Result Value Ref Range    Iron 68 50 - 999 ug/dL   METABOLIC PANEL, COMPREHENSIVE    Collection Time: 10/22/22  9:35 AM   Result Value Ref Range    Sodium 136 136 - 145 mmol/L    Potassium 4.2 3.5 - 5.5 mmol/L    Chloride 98 (L) 100 - 111 mmol/L    CO2 27 21 - 32 mmol/L    Anion gap 11 3.0 - 18.0 mmol/L    Glucose 88 74 - 99 mg/dL    BUN 8 7 - 18 mg/dL    Creatinine 0.57 (L) 0.60 - 1.30 mg/dL    BUN/Creatinine ratio 14 12 - 20      eGFR >60 >60 ml/min/1.73m2    Calcium 9.9 8.5 - 10.1 mg/dL    Bilirubin, total 0.3 0.2 - 1.0 mg/dL    AST (SGOT) 13 10 - 38 U/L    ALT (SGPT) 23 13 - 56 U/L    Alk.  phosphatase 31 (L) 45 - 117 U/L    Protein, total 8.4 (H) 6.4 - 8.2 g/dL    Albumin 4.0 3.4 - 5.0 g/dL    Globulin 4.4 (H) 2.0 - 4.0 g/dL    A-G Ratio 0.9 0.8 - 1.7     VITAMIN B1, WHOLE BLOOD    Collection Time: 10/22/22  9:35 AM   Result Value Ref Range    Vitamin B1 88.9 66.5 - 200.0 nmol/L   VITAMIN B12 & FOLATE    Collection Time: 10/22/22  9:35 AM   Result Value Ref Range    Vitamin B12 921 (H) 211 - 911 pg/mL    Folate 19.3 (H) 3.10 - 17.50 ng/mL   VITAMIN D, 25 HYDROXY    Collection Time: 10/22/22  9:35 AM   Result Value Ref Range    Vitamin D 25-Hydroxy 27.7 (L) 30 - 100 ng/mL   HEMOGLOBIN A1C WITH EAG    Collection Time: 10/22/22 9:36 AM   Result Value Ref Range    Hemoglobin A1c 5.5 4.2 - 5.6 %    Est. average glucose 111 mg/dL       Assessment/Plan:   She is currently 2 years s/p laparoscopic gastric bypass surgery with a total weight loss of 73 lbs to date. Labs were reviewed and pt was instructed to continue MVI/B1/B12/D supplementation, ensure taking at least 5000 units vitamin D daily due to vitamin D insufficiency. Will continue phentermine, refill placed. Continue with high protein/low carb diet and being active. We have reviewed the components of a successful postoperative course including requirement for a high protein, low carbohydrate diet, 64 oz a day of zero calorie liquids, daily vitamin supplementation, daily exercise (150 mis/week), regular follow-up, and participation in support groups. Refer to registered dietitian for more detailed medical nutrition therapy as needed. The primary encounter diagnosis was Post-resection malabsorption. Diagnoses of S/P gastric bypass, Morbid obesity ([facility] [address] BMI 45.0-49.9, adult ([facility] [address] Encounter for weight loss counseling, and Follow-up encounter involving medication were also pertinent to this visit. Follow-up and Dispositions    Return in about 6 weeks (around 12/7/2022). sooner as needed.   Ruth Dotson NP

## 2022-10-27 ENCOUNTER — HOSPITAL ENCOUNTER (OUTPATIENT)
Dept: MAMMOGRAPHY | Age: 47
Discharge: HOME | End: 2022-10-27
Payer: MEDICAID

## 2022-10-27 DIAGNOSIS — Z12.31 VISIT FOR SCREENING MAMMOGRAM: ICD-10-CM

## 2022-10-27 PROCEDURE — 77063 BREAST TOMOSYNTHESIS BI: CPT

## 2022-10-28 NOTE — TELEPHONE ENCOUNTER
From: Elizabeth Martinez  To:  Belinda Roman NP  Sent: 10/22/2022 5:42 PM EDT  Subject: Question regarding METABOLIC PANEL, COMPREHENSIVE    Good evening I read my test results Im concerned are they good or bad I googled sun of the results

## 2022-12-05 ENCOUNTER — OFFICE VISIT (OUTPATIENT)
Dept: SURGERY | Age: 47
End: 2022-12-05
Payer: MEDICAID

## 2022-12-05 VITALS
HEART RATE: 74 BPM | OXYGEN SATURATION: 100 % | TEMPERATURE: 97.9 F | BODY MASS INDEX: 46.33 KG/M2 | WEIGHT: 236 LBS | SYSTOLIC BLOOD PRESSURE: 138 MMHG | HEIGHT: 60 IN | RESPIRATION RATE: 18 BRPM | DIASTOLIC BLOOD PRESSURE: 80 MMHG

## 2022-12-05 DIAGNOSIS — E66.01 MORBID OBESITY (HCC): ICD-10-CM

## 2022-12-05 DIAGNOSIS — Z79.899 FOLLOW-UP ENCOUNTER INVOLVING MEDICATION: ICD-10-CM

## 2022-12-05 DIAGNOSIS — Z71.3 ENCOUNTER FOR WEIGHT LOSS COUNSELING: ICD-10-CM

## 2022-12-05 DIAGNOSIS — K91.2 POST-RESECTION MALABSORPTION: ICD-10-CM

## 2022-12-05 DIAGNOSIS — Z98.84 S/P GASTRIC BYPASS: ICD-10-CM

## 2022-12-05 PROCEDURE — 99213 OFFICE O/P EST LOW 20 MIN: CPT | Performed by: NURSE PRACTITIONER

## 2022-12-05 RX ORDER — MULTIVIT-MIN/IRON/FOLIC ACID/K 45-800-120
CAPSULE ORAL DAILY
COMMUNITY

## 2022-12-05 RX ORDER — PHENTERMINE HYDROCHLORIDE 37.5 MG/1
37.5 TABLET ORAL
Qty: 45 TABLET | Refills: 0 | Status: SHIPPED | OUTPATIENT
Start: 2022-12-05

## 2022-12-05 NOTE — PROGRESS NOTES
Bariatric Postoperative Progress Note    CC: Weight Management/Medication Follow Up    Kristie Rangel is a 41 y.o. female now 2 years status post laparoscopic gastric bypass surgery performed on 11/30/2020.  -0 lbs  Doing well overall. Currently eating eggs, fish, turkey sausage, turkey frank, meat sticks w/ string cheese, protein shakes and chicken. Taking in 64 oz water,  unknown g protein. 30 min of activity 4 days a week. Bowel movements are alternating constipation and regularity. She is compliant with multivitamins, calcium, Vit D and B12 supplements. Did not  refill of phentermine as pharmacy increased price to over $40, she has not taken in almost a month. Would like to restart. 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 Weight Loss Metrics 12/5/2022 12/5/2022 10/26/2022 10/26/2022 9/26/2022 9/26/2022 8/26/2022   Pre op / Initial Wt 309 - 309 - 309 - 309   Today's Wt - 236 lb - 236 lb - 243 lb -   BMI - 46.09 kg/m2 - 46.09 kg/m2 - 47.46 kg/m2 -   Ideal Body Wt 119 - 119 - 119 - 119   Excess Body Wt 190 - 190 - 190 - 190   Goal Wt 157 - 157 - 157 - 157   Wt loss to date 73 - 73 - 66 - 70   % Wt Loss 0.48 - 0.48 - 0.43 - 0.46   80%  - 152 - 152 - 152         Past Medical History:   Diagnosis Date    Anxiety and depression     Back pain     Constipation     Diverticulitis     Fibroids     GERD (gastroesophageal reflux disease)     HTN (hypertension)     no meds    Knee pain     Menopause     Morbid obesity (HCC)     OA (osteoarthritis)     of knees       Past Surgical History:   Procedure Laterality Date    HX BREAST REDUCTION  2016?     HX COLONOSCOPY      HX GASTRIC BYPASS      HX GYN      btl and partial hysterectomy    HX HYSTERECTOMY  Sept?  2020    JOANIE done at [place] [address] w/ Dr Richard Vera  11/30/2020    HX OOPHORECTOMY      SD BREAST SURGERY PROCEDURE UNLISTED      reduction       Current Outpatient Medications   Medication Sig Dispense Refill    multivitamin-min-iron-FA-vit K (Bariatric Multivitamins) 45 mg iron- 800 mcg-120 mcg cap Take  by mouth daily. [employer] MVI      phentermine (ADIPEX-P) 37.5 mg tablet Take 1 Tablet by mouth every morning. Max Daily Amount: 37.5 mg. 45 Tablet 0    amLODIPine (NORVASC) 10 mg tablet take 1 tablet by mouth once daily 90 Tablet 0    Biotin 2,500 mcg cap Take  by mouth.      calcium citrate 200 mg (950 mg) tablet Take 500 mg by mouth three (3) times daily. diclofenac (VOLTAREN) 1 % gel Apply 4 g to affected area four (4) times daily as needed for Pain. (Patient not taking: No sig reported) 450 g 1    lidocaine 4 % patch Apply to area of discomfort every 12 hours as needed. (Patient not taking: No sig reported) 15 Each 1    zolpidem (AMBIEN) 5 mg tablet Take 1 Tablet by mouth nightly as needed for Sleep. Max Daily Amount: 5 mg. (Patient not taking: No sig reported) 30 Tablet 0       Allergies   Allergen Reactions    Lisinopril Other (comments)     Swollen lips       ROS:  Review of Systems   Constitutional:  Negative for malaise/fatigue and weight loss. Respiratory: Negative. Cardiovascular:  Negative for chest pain and palpitations. Gastrointestinal: Negative. Neurological: Negative. Physicial Exam:  Visit Vitals  /80   Pulse 74   Temp 97.9 °F (36.6 °C) (Temporal)   Resp 18   Ht 5' (1.524 m)   Wt 107 kg (236 lb)   LMP 07/16/2020   SpO2 100%   BMI 46.09 kg/m²     Physical Exam  Vitals and nursing note reviewed. Constitutional:       Appearance: She is obese. HENT:      Head: Normocephalic and atraumatic. Pulmonary:      Effort: Pulmonary effort is normal.   Musculoskeletal:         General: Normal range of motion. Neurological:      General: No focal deficit present. Mental Status: She is alert and oriented to person, place, and time.    Psychiatric:         Mood and Affect: Mood normal.         Behavior: Behavior normal. Labs:   No results found for this or any previous visit (from the past 672 hour(s)). Assessment/Plan:   She is currently 2 years s/p laparoscopic gastric bypass surgery with a total weight loss of 73 lbs to date, struggling with weight regain. Will refill phentermine 37.5 mg, take half tab prn daily for appetite suppression. GoodRx coupon provided to pt. Goals for next visit:  -5 lbs  Treadmill/bike/gym once weekly to start and increase to 3 times weekly. She has treadmill and stationary bike at home and [employer]. Importance of activity discussed as she saw good weight loss when she was active at [facility]. We have reviewed the components of a successful postoperative course including requirement for a high protein, low carbohydrate diet, 64 oz a day of zero calorie liquids, daily vitamin supplementation, daily exercise (150 mis/week), regular follow-up, and participation in support groups. Refer to registered dietitian for more detailed medical nutrition therapy as needed. Diagnoses of Post-resection malabsorption, S/P gastric bypass, Morbid obesity ([address] BMI 45.0-49.9, adult ([facility] [address] Encounter for weight loss counseling, and Follow-up encounter involving medication were pertinent to this visit. Follow-up and Dispositions    Return in about 6 weeks (around 1/16/2023). sooner as needed.   Harpreet Ackerman NP

## 2023-01-16 ENCOUNTER — OFFICE VISIT (OUTPATIENT)
Dept: SURGERY | Age: 48
End: 2023-01-16
Payer: MEDICAID

## 2023-01-16 VITALS
RESPIRATION RATE: 16 BRPM | OXYGEN SATURATION: 99 % | TEMPERATURE: 98.8 F | DIASTOLIC BLOOD PRESSURE: 84 MMHG | HEIGHT: 60 IN | BODY MASS INDEX: 44.96 KG/M2 | SYSTOLIC BLOOD PRESSURE: 128 MMHG | WEIGHT: 229 LBS

## 2023-01-16 DIAGNOSIS — Z71.3 ENCOUNTER FOR WEIGHT LOSS COUNSELING: ICD-10-CM

## 2023-01-16 DIAGNOSIS — Z98.84 S/P GASTRIC BYPASS: ICD-10-CM

## 2023-01-16 DIAGNOSIS — E66.01 MORBID OBESITY (HCC): Primary | ICD-10-CM

## 2023-01-16 DIAGNOSIS — Z79.899 FOLLOW-UP ENCOUNTER INVOLVING MEDICATION: ICD-10-CM

## 2023-01-16 DIAGNOSIS — K91.2 POST-RESECTION MALABSORPTION: ICD-10-CM

## 2023-01-16 PROCEDURE — 99212 OFFICE O/P EST SF 10 MIN: CPT | Performed by: NURSE PRACTITIONER

## 2023-01-16 RX ORDER — PHENTERMINE HYDROCHLORIDE 37.5 MG/1
37.5 TABLET ORAL
Qty: 60 TABLET | Refills: 0 | Status: SHIPPED | OUTPATIENT
Start: 2023-01-16

## 2023-01-16 NOTE — PROGRESS NOTES
Bariatric Postoperative Progress Note    CC: Weight Management/Medication Follow Up    Slade Monsivais is a 41 y.o. female now 2  status post laparoscopic gastric bypass surgery performed on 11/30/2020.  -7 lbs  Doing well overall. Currently eating chicken, fish, and eggs . Taking in 64 oz sugar free fluids,  unknown grams of protein. 30 min of activity 7 days a week. Bowel movements are alternating constipation and regularity. She is compliant with multivitamins, calcium, Vit D and B12 supplements. Started working out regularly, doing workout videos at home. Taking full tab of 37.5 mg phentermine and reports it is assisting to suppress appetite, denies any SE and desires to continue. Weight Loss Metrics 1/16/2023 1/16/2023 12/5/2022 12/5/2022 10/26/2022 10/26/2022 9/26/2022   Pre op / Initial Wt 309 - 309 - 309 - 309   Today's Wt - 229 lb - 236 lb - 236 lb -   BMI - 44.72 kg/m2 - 46.09 kg/m2 - 46.09 kg/m2 -   Ideal Body Wt 119 - 119 - 119 - 119   Excess Body Wt 190 - 190 - 190 - 190   Goal Wt 157 - 157 - 157 - 157   Wt loss to date 80 - 73 - 73 - 66   % Wt Loss 0.53 - 0.48 - 0.48 - 0.43   80%  - 152 - 152 - 152       Past Medical History:   Diagnosis Date    Anxiety and depression     Back pain     Constipation     Diverticulitis     Fibroids     GERD (gastroesophageal reflux disease)     HTN (hypertension)     no meds    Knee pain     Menopause     Morbid obesity (HCC)     OA (osteoarthritis)     of knees       Past Surgical History:   Procedure Laterality Date    HX BREAST REDUCTION  2016? HX COLONOSCOPY      HX GASTRIC BYPASS      HX GYN      btl and partial hysterectomy    HX HYSTERECTOMY  Sept?  2020    JOANIE done at [facility] w/ Dr Karen Chan  11/30/2020    HX OOPHORECTOMY      [place] UNLISTED PROCEDURE BREAST      reduction       Current Outpatient Medications   Medication Sig Dispense Refill    phentermine (ADIPEX-P) 37.5 mg tablet Take 1 Tablet by mouth every morning. Max Daily Amount: 37.5 mg. 60 Tablet 0    multivitamin-min-iron-FA-vit K (Bariatric Multivitamins) 45 mg iron- 800 mcg-120 mcg cap Take  by mouth daily. [employer] MVI      amLODIPine (NORVASC) 10 mg tablet take 1 tablet by mouth once daily 90 Tablet 0    Biotin 2,500 mcg cap Take  by mouth.      calcium citrate 200 mg (950 mg) tablet Take 500 mg by mouth three (3) times daily. diclofenac (VOLTAREN) 1 % gel Apply 4 g to affected area four (4) times daily as needed for Pain. (Patient not taking: No sig reported) 450 g 1    lidocaine 4 % patch Apply to area of discomfort every 12 hours as needed. (Patient not taking: No sig reported) 15 Each 1    zolpidem (AMBIEN) 5 mg tablet Take 1 Tablet by mouth nightly as needed for Sleep. Max Daily Amount: 5 mg. (Patient not taking: No sig reported) 30 Tablet 0       Allergies   Allergen Reactions    Lisinopril Other (comments)     Swollen lips       ROS:  Review of Systems   Constitutional:  Positive for weight loss. Negative for malaise/fatigue. Respiratory: Negative. Cardiovascular:  Negative for chest pain and palpitations. Gastrointestinal: Negative. Neurological: Negative. Physicial Exam:  Visit Vitals  /84 (BP 1 Location: Left lower arm, BP Patient Position: Sitting)   Temp 98.8 °F (37.1 °C) (Temporal)   Resp 16   Ht 5' (1.524 m)   Wt 103.9 kg (229 lb)   LMP 07/16/2020   SpO2 99%   BMI 44.72 kg/m²     Physical Exam  Vitals and nursing note reviewed. Constitutional:       Appearance: She is obese. HENT:      Head: Normocephalic and atraumatic. Pulmonary:      Effort: Pulmonary effort is normal.   Musculoskeletal:         General: Normal range of motion. Neurological:      General: No focal deficit present. Mental Status: She is alert and oriented to person, place, and time.    Psychiatric:         Mood and Affect: Mood normal.         Behavior: Behavior normal.       Labs:   No results found for this or any previous visit (from the past 672 hour(s)). Assessment/Plan:   She is currently 2 years s/p laparoscopic gastric bypass surgery with a total weight loss of 80 lbs to date, struggling with weight regain. Will refill phentermine 37.5 mg. Continue with high protein/low carb bariatric diet. Continue with exercise. Goal for next visit: 220 lbs  We have reviewed the components of a successful postoperative course including requirement for a high protein, low carbohydrate diet, 64 oz a day of zero calorie liquids, daily vitamin supplementation, daily exercise (150 mis/week), regular follow-up, and participation in support groups. Refer to registered dietitian for more detailed medical nutrition therapy as needed. The primary encounter diagnosis was Morbid obesity ([facility] [address] Diagnoses of Post-resection malabsorption, S/P gastric bypass, Encounter for weight loss counseling, Follow-up encounter involving medication, and BMI 40.0-44.9, adult ([facility] [address] were also pertinent to this visit. Follow-up and Dispositions    Return in about 2 months (around 3/16/2023). sooner as needed.   Lynsey Diaz NP

## 2023-02-27 ENCOUNTER — APPOINTMENT (OUTPATIENT)
Age: 48
End: 2023-02-27
Payer: COMMERCIAL

## 2023-02-27 ENCOUNTER — HOSPITAL ENCOUNTER (EMERGENCY)
Age: 48
Discharge: HOME | End: 2023-02-28
Attending: EMERGENCY MEDICINE | Admitting: EMERGENCY MEDICINE
Payer: COMMERCIAL

## 2023-02-27 VITALS
HEART RATE: 72 BPM | DIASTOLIC BLOOD PRESSURE: 107 MMHG | HEIGHT: 60 IN | SYSTOLIC BLOOD PRESSURE: 148 MMHG | TEMPERATURE: 98.7 F | BODY MASS INDEX: 44.96 KG/M2 | WEIGHT: 229 LBS | OXYGEN SATURATION: 100 % | RESPIRATION RATE: 18 BRPM

## 2023-02-27 DIAGNOSIS — R09.81 NASAL CONGESTION: ICD-10-CM

## 2023-02-27 DIAGNOSIS — R05.1 ACUTE COUGH: Primary | ICD-10-CM

## 2023-02-27 LAB
FLUAV AG NPH QL IA: NEGATIVE
FLUBV AG NOSE QL IA: NEGATIVE

## 2023-02-27 PROCEDURE — 99283 EMERGENCY DEPT VISIT LOW MDM: CPT | Performed by: EMERGENCY MEDICINE

## 2023-02-27 PROCEDURE — 6370000000 HC RX 637 (ALT 250 FOR IP): Performed by: EMERGENCY MEDICINE

## 2023-02-27 PROCEDURE — 71046 X-RAY EXAM CHEST 2 VIEWS: CPT

## 2023-02-27 PROCEDURE — 87804 INFLUENZA ASSAY W/OPTIC: CPT

## 2023-02-27 RX ORDER — ACETAMINOPHEN 500 MG
1000 TABLET ORAL
Status: COMPLETED | OUTPATIENT
Start: 2023-02-27 | End: 2023-02-27

## 2023-02-27 RX ORDER — BENZONATATE 100 MG/1
100 CAPSULE ORAL
Status: COMPLETED | OUTPATIENT
Start: 2023-02-27 | End: 2023-02-27

## 2023-02-27 RX ADMIN — ACETAMINOPHEN 1000 MG: 500 TABLET ORAL at 22:50

## 2023-02-27 RX ADMIN — BENZONATATE 100 MG: 100 CAPSULE ORAL at 22:50

## 2023-02-28 RX ORDER — BENZONATATE 100 MG/1
100 CAPSULE ORAL 3 TIMES DAILY PRN
Qty: 20 CAPSULE | Refills: 0 | Status: SHIPPED | OUTPATIENT
Start: 2023-02-28 | End: 2023-03-07

## 2023-02-28 ASSESSMENT — ENCOUNTER SYMPTOMS
SHORTNESS OF BREATH: 0
SINUS PRESSURE: 1
SINUS PAIN: 1
COUGH: 1

## 2023-02-28 NOTE — ED PROVIDER NOTES
[facility] EMERGENCY DEPT  EMERGENCY DEPARTMENT ENCOUNTER       Pt Name: Rama Felix  MRN: [id number]  Yovanygfmel[place] 1975  Date of evaluation: 2/27/2023  Provider: Precious Allen DO   PCP: None Provider  Note Started: 3:39 AM 2/28/23     CHIEF COMPLAINT       Chief Complaint   Patient presents with    Cough    Nasal Congestion    Dizziness        HISTORY OF PRESENT ILLNESS: 1 or more elements      History From: Patient       Rama Felix is a 41 y.o. female who presents to the emergency room with a chief complaint of cough, nasal congestion, and dizziness. Patient states that she has had a cough for the past 2 days. Patient states that the cough is nonproductive. Patient denies any fever or chills. Patient states that she is also had sinus pressure. Patient states that the dizziness that she is experiencing is more of a lightheadedness. Patient denies any chest pain or shortness of breath. Patient denies any nausea or vomiting. Nursing Notes were all reviewed and agreed with or any disagreements were addressed in the HPI. REVIEW OF SYSTEMS      Review of Systems   Constitutional:  Negative for chills and fever. HENT:  Positive for sinus pressure and sinus pain. Respiratory:  Positive for cough. Negative for shortness of breath. Cardiovascular:  Negative for chest pain. Neurological:  Positive for light-headedness. Positives and Pertinent negatives as per HPI. PAST HISTORY     Past Medical History:  Past Medical History:   Diagnosis Date    Anxiety and depression     Back pain     Constipation     Diverticulitis     Fibroids     GERD (gastroesophageal reflux disease)     HTN (hypertension)     no meds    Knee pain     Menopause     Morbid obesity ([address]     OA (osteoarthritis)     of knees       Past Surgical History:  Past Surgical History:   Procedure Laterality Date    BREAST REDUCTION SURGERY  2016?     BREAST SURGERY      reduction    COLONOSCOPY      GASTRIC BYPASS SURGERY      GASTRIC BYPASS SURGERY  11/30/2020    GYN      btl and partial hysterectomy    HYSTERECTOMY (CERVIX STATUS UNKNOWN)  Sept?  2020    KAMILAH done at [facility] w/ Dr Lorena Live         Family History:  Family History   Problem Relation Age of Onset    Hypertension Mother     Hypertension Father        Social History:  Social History     Tobacco Use    Smoking status: Never    Smokeless tobacco: Never   Substance Use Topics    Alcohol use: Not Currently    Drug use: Not Currently       Allergies: Allergies   Allergen Reactions    Lisinopril Other (See Comments)     Swollen lips       CURRENT MEDICATIONS      Discharge Medication List as of 2/28/2023 12:06 AM        CONTINUE these medications which have NOT CHANGED    Details   Biotin 2.5 MG CAPS Take by mouthHistorical Med      calcium citrate (CALCITRATE) 950 (200 Ca) MG tablet Take 500 mg by mouth 3 times dailyHistorical Med      phentermine (ADIPEX-P) 37.5 MG tablet Take 37.5 mg by mouth. Historical Med             SCREENINGS               No data recorded         PHYSICAL EXAM      ED Triage Vitals [02/27/23 2208]   Enc Vitals Group      BP (!) 183/107      Heart Rate 88      Resp 18      Temp 98.7 °F (37.1 °C)      Temp Source Oral      SpO2 100 %      Weight 229 lb (103.9 kg)      Height 5' (1.524 m)      Head Circumference       Peak Flow       Pain Score       Pain Loc       Pain Edu? Excl. in [address]? Physical Exam  Vitals and nursing note reviewed. Constitutional:       General: She is not in acute distress. Appearance: Normal appearance. She is not diaphoretic. HENT:      Head: Normocephalic and atraumatic. Right Ear: External ear normal.      Left Ear: External ear normal.      Nose: Congestion (Mild nasal) present. Mouth/Throat:      Mouth: Mucous membranes are moist.      Pharynx: No oropharyngeal exudate or posterior oropharyngeal erythema. Eyes:      General:         Right eye: No discharge. Left eye: No discharge. Extraocular Movements: Extraocular movements intact. Conjunctiva/sclera: Conjunctivae normal.      Pupils: Pupils are equal, round, and reactive to light. Cardiovascular:      Rate and Rhythm: Normal rate and regular rhythm. Heart sounds: Normal heart sounds. No murmur heard. No friction rub. No gallop. Pulmonary:      Effort: Pulmonary effort is normal. No respiratory distress. Breath sounds: Normal breath sounds. No stridor. No wheezing or rhonchi. Musculoskeletal:         General: No tenderness. Normal range of motion. Cervical back: Normal range of motion and neck supple. Skin:     General: Skin is warm. Capillary Refill: Capillary refill takes less than 2 seconds. Coloration: Skin is not pale. Findings: No erythema or rash. Neurological:      General: No focal deficit present. Mental Status: She is alert and oriented to person, place, and time. Cranial Nerves: No cranial nerve deficit. Sensory: No sensory deficit. Psychiatric:         Mood and Affect: Mood normal.         Behavior: Behavior normal.         Thought Content:  Thought content normal.         Judgment: Judgment normal.        DIAGNOSTIC RESULTS   LABS:     Results for orders placed or performed during the hospital encounter of 02/27/23   Rapid influenza A/B antigens    Specimen: Nasal Washing   Result Value Ref Range    Influenza A Ag Negative Negative      Influenza B Ag Negative Negative         RADIOLOGY:  Non-plain film images such as CT, Ultrasound and MRI are read by the radiologist. Plain radiographic images are visualized and preliminarily interpreted by the ED Provider with the below findings:        Interpretation per the Radiologist below, if available at the time of this note:     XR CHEST (2 VW)    Result Date: 2/27/2023  INDICATION: cough, fever, ? pneumonia COMPARISON: September 20 9.41 FINDINGS: Frontal and lateral views of the chest demonstrate a normal cardiomediastinal silhouette. The lungs are adequately expanded. There is no edema, effusion, consolidation, or pneumothorax. The osseous structures are unremarkable. No acute process. PROCEDURES   Unless otherwise noted below, none  Procedures     CRITICAL CARE TIME       EMERGENCY DEPARTMENT COURSE and DIFFERENTIAL DIAGNOSIS/MDM   Vitals:    Vitals:    02/27/23 2208 02/27/23 2352   BP: (!) 183/107 (!) 148/107   Pulse: 88 72   Resp: 18 18   Temp: 98.7 °F (37.1 °C)    TempSrc: Oral    SpO2: 100% 100%   Weight: 229 lb (103.9 kg)    Height: 5' (1.524 m)         Patient was given the following medications:  Medications   acetaminophen (TYLENOL) tablet 1,000 mg (1,000 mg Oral Given 2/27/23 2250)   benzonatate (TESSALON) capsule 100 mg (100 mg Oral Given 2/27/23 2250)       CONSULTS: (Who and What was discussed)  None    Chronic Conditions: Hypertension    Social Determinants affecting Dx or Tx: None    Records Reviewed (source and summary of external notes): as medically indicated    CC/HPI Summary, DDx, ED Course, and Reassessment: 70-year-old female with chief complaint of cough and sinus pressure. Differential diagnoses include pneumonia, bronchitis, allergic rhinitis, sinusitis, reactive airway disease. Patient main stable throughout her time in the emergency room. Chest x-ray negative. Suspect this is more of an allergic rhinitis picture. Patient instructed on symptomatic relief. Patient given Edwardo Balder to go home with for the cough. Antihistamines will also probably help. Disposition Considerations (Tests not done, Shared Decision Making, Pt Expectation of Test or Tx.): Shared decision making and refraining from antibiotics at this time as this sounds more allergic rhinitis versus viral URI type symptoms. FINAL IMPRESSION     1. Acute cough    2.  Nasal congestion          DISPOSITION/PLAN   DISPOSITION Decision To Discharge 02/28/2023 12:04:36 AM      Discussed results and plan with patient. Patient will be discharged home with PCP follow up. Patient instructed to return to the emergency room for any worsening symptoms or any other concerns. PATIENT REFERRED TO:  Missael Brownlee MD  [address] [facility]  [facility]  [phone]    In 1 week      [facility] EMERGENCY DEPT  [facility] [address] [id number]  [phone]    If symptoms worsen      DISCHARGE MEDICATIONS:     Medication List        START taking these medications      benzonatate 100 MG capsule  Commonly known as: TESSALON  Take 1 capsule by mouth 3 times daily as needed for Cough            CONTINUE taking these medications      Biotin 2.5 MG Caps     calcium citrate 950 (200 Ca) MG tablet  Commonly known as: CALCITRATE     phentermine 37.5 MG tablet  Commonly known as: ADIPEX-P               Where to Get Your Medications        These medications were sent to [address] [place] [address] [address]  [address], [address] [facility]      Phone: [phone]   benzonatate 100 MG capsule           DISCONTINUED MEDICATIONS:  Discharge Medication List as of 2/28/2023 12:06 AM        STOP taking these medications       amLODIPine (NORVASC) 10 MG tablet Comments:   Reason for Stopping:         diclofenac sodium (VOLTAREN) 1 % GEL Comments:   Reason for Stopping:         lidocaine 4 % external patch Comments:   Reason for Stopping:         zolpidem (AMBIEN) 5 MG tablet Comments:   Reason for Stopping:               I am the Primary Clinician of Record. Pan Chaparro DO (electronically signed)    (Please note that parts of this dictation were completed with voice recognition software. Quite often unanticipated grammatical, syntax, homophones, and other interpretive errors are inadvertently transcribed by the computer software. Please disregards these errors.  Please excuse any errors that have escaped final proofreading.)          Ben Robles DO  02/28/23 9014

## 2023-02-28 NOTE — DISCHARGE INSTRUCTIONS
You were seen and evaluated for your nasal congestion and cough. Your chest x-ray does not show any pneumonia. Your flu test came back negative. Suspect that this is more of a allergy problem. You are given a prescription for Tessalon Perles to help with the cough. Use saline rinse for your nasal passages as indicated. Also use antihistamine such as Zyrtec or Claritin. Follow-up with PCP. Return the emergency room for worsening symptoms or any other concerns.

## 2023-03-17 ENCOUNTER — OFFICE VISIT (OUTPATIENT)
Age: 48
End: 2023-03-17
Payer: COMMERCIAL

## 2023-03-17 VITALS
HEART RATE: 102 BPM | HEIGHT: 60 IN | BODY MASS INDEX: 45.94 KG/M2 | WEIGHT: 234 LBS | OXYGEN SATURATION: 100 % | DIASTOLIC BLOOD PRESSURE: 90 MMHG | RESPIRATION RATE: 18 BRPM | SYSTOLIC BLOOD PRESSURE: 148 MMHG | TEMPERATURE: 98.4 F

## 2023-03-17 DIAGNOSIS — E66.01 MORBID OBESITY (HCC): Primary | ICD-10-CM

## 2023-03-17 DIAGNOSIS — Z98.84 S/P GASTRIC BYPASS: ICD-10-CM

## 2023-03-17 DIAGNOSIS — Z71.3 ENCOUNTER FOR WEIGHT LOSS COUNSELING: ICD-10-CM

## 2023-03-17 DIAGNOSIS — K91.2 POST-RESECTION MALABSORPTION: ICD-10-CM

## 2023-03-17 PROCEDURE — 99213 OFFICE O/P EST LOW 20 MIN: CPT | Performed by: NURSE PRACTITIONER

## 2023-03-17 RX ORDER — SEMAGLUTIDE 0.25 MG/.5ML
0.25 INJECTION, SOLUTION SUBCUTANEOUS
Qty: 2 ML | Refills: 0 | Status: SHIPPED | OUTPATIENT
Start: 2023-03-17

## 2023-03-17 RX ORDER — SEMAGLUTIDE 0.5 MG/.5ML
0.5 INJECTION, SOLUTION SUBCUTANEOUS
Qty: 2 ML | Refills: 0 | Status: SHIPPED | OUTPATIENT
Start: 2023-03-17

## 2023-03-17 ASSESSMENT — ENCOUNTER SYMPTOMS
CONSTIPATION: 1
NAUSEA: 0
ABDOMINAL PAIN: 0
VOMITING: 0
SHORTNESS OF BREATH: 0
COUGH: 0
DIARRHEA: 0

## 2023-03-17 NOTE — PROGRESS NOTES
Bariatric Postoperative Progress Note    CC: Weight Management    Gisela Brenner is a 41 y.o. female now 2 years status post laparoscopic gastric bypass surgery performed on 11/30/2020.  +5 lbs  Doing well overall. Currently eating chicken, fish, turkey holliday, turkey sausage, eggs. Taking in 64 oz sugar free fluids,  40 plus g protein. 15 min of activity 2 days a week. Bowel movements are alternating constipation and regularity. She is compliant with multivitamins, calcium, Vit D and B12 supplements. Has not been as active lately due to busy schedule but continues to focus on high protein/low carb bariatric diet. Reports she will start at [employer] next week and will be more active. Has been taking 37.5 mg phentermine prn for appetite suppression. She did not take it for one week since last visit due to constipation. Experiencing skin irritation and itching under pannus, interested in panniculectomy. Weight Loss Metrics 3/17/2023   Pre-op weight (manual entry) 309 lbs   Height 5' 0\"   Weight 234 lbs   BMI (Calculated) 45.8 kg/m2   Ideal body weight (manual entry) 119 lbs   EBW in lbs. 190   Weight loss to date in lbs. 75   Percent weight loss (%) 24.27 %   Percent EBW loss (%) 39.5 %   Some recent data might be hidden         Past Medical History:   Diagnosis Date    Anxiety and depression     Back pain     Constipation     Diverticulitis     Fibroids     GERD (gastroesophageal reflux disease)     HTN (hypertension)     no meds    Knee pain     Menopause     Morbid obesity (HCC)     OA (osteoarthritis)     of knees       Past Surgical History:   Procedure Laterality Date    BREAST REDUCTION SURGERY  2016?     BREAST SURGERY      reduction    COLONOSCOPY      GASTRIC BYPASS SURGERY      GASTRIC BYPASS SURGERY  11/30/2020    GYN      btl and partial hysterectomy    HYSTERECTOMY (CERVIX STATUS UNKNOWN)  Sept?  2020    KAMILAH done at [facility] w/ Dr Duarte Hinds Medications   Medication Sig Dispense Refill    Multiple Vitamins-Minerals (BARIATRIC MULTIVITAMINS/IRON PO) Take by mouth daily ComptTIA MVI      Semaglutide-Weight Management (WEGOVY) 0.25 MG/0.5ML SOAJ SC injection Inject 0.25 mg into the skin every 7 days Weeks 1-4 2 mL 0    Semaglutide-Weight Management (WEGOVY) 0.5 MG/0.5ML SOAJ SC injection Inject 0.5 mg into the skin every 7 days Weeks 5-8 2 mL 0    Biotin 2.5 MG CAPS Take by mouth      calcium citrate (CALCITRATE) 950 (200 Ca) MG tablet Take 500 mg by mouth 3 times daily       No current facility-administered medications for this visit. Allergies   Allergen Reactions    Lisinopril Other (See Comments)     Swollen lips       ROS:  Review of Systems   Constitutional:  Negative for chills and fever. Respiratory:  Negative for cough and shortness of breath. Cardiovascular:  Negative for chest pain and palpitations. Gastrointestinal:  Positive for constipation. Negative for abdominal pain, diarrhea, nausea and vomiting. Neurological: Negative. Physical Exam:  Vitals:    03/17/23 1315 03/17/23 1326   BP: (!) 144/86 (!) 148/90   Site: Left Upper Arm Right Upper Arm   Position: Sitting Sitting   Cuff Size: Large Adult Large Adult   Pulse: (!) 102    Resp: 18    Temp: 98.4 °F (36.9 °C)    TempSrc: Temporal    SpO2: 100%    Weight: 234 lb (106.1 kg)    Height: 5' (1.524 m)       Body mass index is 45.7 kg/m². Saundra Evans has been given the following recommendations today due to her elevated BP reading referred to Alternative/PCP    Physical Exam  Vitals and nursing note reviewed. Constitutional:       Appearance: She is obese. HENT:      Head: Normocephalic and atraumatic. Pulmonary:      Effort: Pulmonary effort is normal.   Musculoskeletal:         General: Normal range of motion. Neurological:      General: No focal deficit present. Mental Status: She is alert and oriented to person, place, and time. Psychiatric:         Mood and Affect: Mood normal.         Behavior: Behavior normal.        Labs:   Recent Results (from the past 672 hour(s))   Rapid influenza A/B antigens    Collection Time: 02/27/23 10:37 PM    Specimen: Nasal Washing   Result Value Ref Range    Influenza A Ag Negative Negative      Influenza B Ag Negative Negative         Assessment/Plan:   She is currently 2 years s/p laparoscopic gastric bypass surgery  with a total weight loss of 75 lbs to date, struggling with weight regain. Will d/c phentermine due to constipation and elevated blood pressure. Discussed weight loss medications to assist with her chronic reoccurring obesity. Orlistat: may reduce absorption of fat soluble vitamins, leading to a deficiency state. Predisposing factors include a pre-existing deficiency of fat soluable vitamins or a malabsorption syndrome. For this reason this contraindicated in this patient  Phentermine (Adipex-P): elevated HTN  Benzphetamine (Didrex): see phentermine  Diethylpropion (Tenuate): see phentermine  Bupropion-naltrexone (Contrave): not indicated for specific eating behaviors  Phentermine-topiramate (Qysmia): see phentermine   Saxenda/Wegovy: Recommended for pt's chronic reoccurring obesity  Other (please specify) s/p gastric bypass with weight regain, work with RD, low carb/high protein diet and PA  [facility]. Continue high protein/low carb bariatric diet. Increase activity as tolerated. We have reviewed the components of a successful postoperative course including requirement for a high protein, low carbohydrate diet, 64 oz a day of zero calorie liquids, daily vitamin supplementation, daily exercise (150 mis/week), regular follow-up, and participation in support groups. Refer to registered dietitian for more detailed medical nutrition therapy as needed. The primary encounter diagnosis was Morbid obesity ([address]  Diagnoses of BMI 45.0-49.9, adult ([facility] Post-resection malabsorption, S/P gastric bypass, and Encounter for weight loss counseling were also pertinent to this visit. Return in about 6 weeks (around 4/28/2023) for Weight Management. sooner as needed.   ROSSY Valadez - NP

## 2023-03-27 ENCOUNTER — TELEPHONE (OUTPATIENT)
Age: 48
End: 2023-03-27

## 2023-05-01 ENCOUNTER — OFFICE VISIT (OUTPATIENT)
Age: 48
End: 2023-05-01
Payer: COMMERCIAL

## 2023-05-01 VITALS
WEIGHT: 229 LBS | BODY MASS INDEX: 44.96 KG/M2 | DIASTOLIC BLOOD PRESSURE: 96 MMHG | HEIGHT: 60 IN | SYSTOLIC BLOOD PRESSURE: 160 MMHG | RESPIRATION RATE: 16 BRPM | OXYGEN SATURATION: 99 % | HEART RATE: 84 BPM | TEMPERATURE: 97.7 F

## 2023-05-01 DIAGNOSIS — K91.2 POST-RESECTION MALABSORPTION: ICD-10-CM

## 2023-05-01 DIAGNOSIS — E66.01 MORBID OBESITY (HCC): ICD-10-CM

## 2023-05-01 DIAGNOSIS — E66.01 MORBID OBESITY (HCC): Primary | ICD-10-CM

## 2023-05-01 DIAGNOSIS — Z98.84 S/P GASTRIC BYPASS: ICD-10-CM

## 2023-05-01 DIAGNOSIS — Z79.899 FOLLOW-UP ENCOUNTER INVOLVING MEDICATION: ICD-10-CM

## 2023-05-01 DIAGNOSIS — Z71.3 ENCOUNTER FOR WEIGHT LOSS COUNSELING: ICD-10-CM

## 2023-05-01 PROCEDURE — 99213 OFFICE O/P EST LOW 20 MIN: CPT | Performed by: NURSE PRACTITIONER

## 2023-05-01 RX ORDER — SEMAGLUTIDE 1 MG/.5ML
1 INJECTION, SOLUTION SUBCUTANEOUS
Qty: 2 ML | Refills: 0 | Status: SHIPPED | OUTPATIENT
Start: 2023-05-01

## 2023-05-01 RX ORDER — SEMAGLUTIDE 2.4 MG/.75ML
2.4 INJECTION, SOLUTION SUBCUTANEOUS
Qty: 2 ML | Refills: 1 | Status: SHIPPED | OUTPATIENT
Start: 2023-05-01

## 2023-05-01 RX ORDER — SEMAGLUTIDE 1.7 MG/.75ML
1.7 INJECTION, SOLUTION SUBCUTANEOUS
Qty: 2 ML | Refills: 0 | Status: SHIPPED | OUTPATIENT
Start: 2023-05-01

## 2023-05-01 RX ORDER — SEMAGLUTIDE 0.25 MG/.5ML
INJECTION, SOLUTION SUBCUTANEOUS
Qty: 2 ML | Refills: 0 | OUTPATIENT
Start: 2023-05-01

## 2023-05-01 RX ORDER — SEMAGLUTIDE 0.5 MG/.5ML
0.5 INJECTION, SOLUTION SUBCUTANEOUS
Qty: 2 ML | Refills: 0 | Status: SHIPPED | OUTPATIENT
Start: 2023-05-01

## 2023-05-01 ASSESSMENT — ENCOUNTER SYMPTOMS
ABDOMINAL PAIN: 0
SHORTNESS OF BREATH: 0
COUGH: 0
CONSTIPATION: 1
VOMITING: 0
DIARRHEA: 0
NAUSEA: 0

## 2023-05-01 NOTE — PROGRESS NOTES
Bariatric Postoperative Progress Note    CC: Weight Management/Medication Follow Up    Shravan Klein is a 41 y.o. female now 2 years status post laparoscopic gastric bypass surgery performed on 11/30/2020.  -5 lbs  Doing well overall. Currently eating eggs, salads, chicken, and fish. Taking in 64 oz sugar free fluids,  60 g protein. Bowel movements are alternating constipation and regularity. She is compliant with multivitamins, calcium, Vit D and B12 supplements. Finished last dose of 0.25 mg Wegovy last week. Reports that medication is suppressing appetite and increasing satiety. Denies any SE and desires to continue. Lots of activity and walking at work at [employer]. She reports she gets two 30 min breaks where she can eat and is doing well on her fluid intake. Weight Loss Metrics 5/1/2023 3/17/2023 2/27/2023 1/16/2023 12/5/2022 10/26/2022 9/26/2022   Pre-op weight (manual entry) 309 lbs 309 lbs - - - - -   Height 5' 0\" 5' 0\" 5' 0\" 5' 0\" 5' 0\" 5' 0\" 5' 0\"   Weight 229 lbs 234 lbs 229 lbs 229 lbs 236 lbs 236 lbs 243 lbs   BMI (Calculated) 44.8 kg/m2 45.8 kg/m2 44.8 kg/m2 44.8 kg/m2 46.2 kg/m2 46.2 kg/m2 47.6 kg/m2   Ideal body weight (manual entry) 119 lbs 119 lbs - - - - -   EBW in lbs. 190 190 - - - - -   Weight loss to date in lbs. 80 75 - - - - -   Percent weight loss (%) 25.89 % 24.27 % - - - - -   Percent EBW loss (%) 42.1 % 39.5 % - - - - -         Past Medical History:   Diagnosis Date    Anxiety and depression     Back pain     Constipation     Diverticulitis     Fibroids     GERD (gastroesophageal reflux disease)     HTN (hypertension)     no meds    Knee pain     Menopause     Morbid obesity (HCC)     OA (osteoarthritis)     of knees       Past Surgical History:   Procedure Laterality Date    BREAST REDUCTION SURGERY  2016?     BREAST SURGERY      reduction    COLONOSCOPY      GASTRIC BYPASS SURGERY      GASTRIC BYPASS SURGERY  11/30/2020    GYN      btl and partial hysterectomy

## 2023-06-15 ENCOUNTER — TELEPHONE (OUTPATIENT)
Age: 48
End: 2023-06-15

## 2023-06-20 DIAGNOSIS — E66.01 MORBID OBESITY (HCC): Primary | ICD-10-CM

## 2023-06-20 RX ORDER — LIRAGLUTIDE 6 MG/ML
INJECTION, SOLUTION SUBCUTANEOUS
Qty: 5 ADJUSTABLE DOSE PRE-FILLED PEN SYRINGE | Refills: 2 | Status: SHIPPED | OUTPATIENT
Start: 2023-06-20

## 2023-06-21 ENCOUNTER — TELEPHONE (OUTPATIENT)
Age: 48
End: 2023-06-21

## 2023-06-21 NOTE — TELEPHONE ENCOUNTER
Patient LVM on nurse line stating Theodore De La Cruz NP did not send medication to pharmacy yet. Contacted patient and notified rx for saxenda and needles sent to pharmacy yesterday. Patient states will go to pharmacy to .

## 2023-09-14 NOTE — PROGRESS NOTES
Anesthesia Pre Eval Note    Anesthesia ROS/Med Hx        Anesthetic Complication History:  Patient does not have a history of anesthetic complications      Pulmonary Review:    Negative for COPD   Negative for recent URI   The patient is not a smoker.    Neuro/Psych Review:    Patient has developmental delays  Negative for CVA  Positive for psychiatric history - Bipolar and Anxiety    Cardiovascular Review:  Exercise tolerance: poor (<4 METS)  Positive for CHF  Negative for CAD  Positive for hypertension  Positive for hyperlipidemia    GI/HEPATIC/RENAL Review:    Positive for renal disease - chronic renal insufficiency    End/Other Review:  Negative for diabetes  Positive for obesity class III - 40.00 - 49.99  Positive for hypothyroidism  Additional Results:     ALLERGIES:   -- Mirtazapine -- Other (See Comments)    --  Loss of appetite       Last Labs        Component                Value               Date/Time                  WBC                      6.8                 03/22/2023 1440            RBC                      4.59                03/22/2023 1440            HGB                      13.5                03/22/2023 1440            HCT                      42.8                03/22/2023 1440            MCV                      93.2                03/22/2023 1440            MCH                      29.4                03/22/2023 1440            MCHC                     31.5 (L)            03/22/2023 1440            RDW-CV                   14.4                03/22/2023 1440            Sodium                   141                 03/22/2023 1440            Potassium                4.3                 03/22/2023 1440            Chloride                 106                 03/22/2023 1440            Carbon Dioxide           32                  03/22/2023 1440            Glucose                  86                  03/22/2023 1440            BUN                      13                  03/22/2023 1440           Preop History and Physical Exam:    Allyson Lanier is a 39 y.o. black female initially evaluated in this office on June 12, 2020 for discussion of surgical options available for the definitive management of her super, super obesity. Patient after discussing the surgical options at that time elected to pursue laparoscopic potentially open Khushboo-en-Y gastric bypass to achieve definitive durable weight loss on a personal level with expected resolution of obesity related comorbidities. The patient represents today after completing all bariatric programmatic multidisciplinary requirements necessary prior to pursuit of surgery for review of the diagnostic evaluation and surgical scheduling with the only new medical and surgical history being a laparoscopic assisted hysterectomy/bilateral salpingooophorectomy on September 11, 2020. The patient currently weighs 309 pounds a body mass index of 60, ideal body weight 120 pounds, excess body weight 189 pounds, estimated postsurgical weight loss based on 8% loss of excess body weight is 151 pounds, with an expected postsurgical goal weight of 158 pounds    Past Medical History:   Diagnosis Date    Anxiety and depression     Back pain     Constipation     Diverticulitis     Fibroids     GERD (gastroesophageal reflux disease)     HTN (hypertension)     Knee pain     Morbid obesity (HCC)     OA (osteoarthritis)     of knees       Past Surgical History:   Procedure Laterality Date    BREAST SURGERY PROCEDURE UNLISTED      reduction    HX BREAST REDUCTION      HX COLONOSCOPY      HX GYN      btl and partial hysterectomy       Current Outpatient Medications   Medication Sig Dispense Refill    cholecalciferol, vitamin D3, (Vitamin D3) 50 mcg (2,000 unit) tab Take 4,000 Units by mouth.  ferrous sulfate 325 mg (65 mg iron) tablet take 1 tablet by mouth once daily      amLODIPine (NORVASC) 10 mg tablet Take 1 tablet every day by oral route.       diclofenac   Creatinine               1.08 (H)            03/22/2023 1440            Glomerular Filtrati*     55 (L)              03/22/2023 1440            Calcium                  9.5                 03/22/2023 1440            PLT                      226                 03/22/2023 1440        Past Medical History:  06/07/2016: Abdominal pain  No date: Anxiety  No date: Colon Polyp      Comment:  tics  No date: Congestive Heart Failure      Comment:  diastolic  No date: Constipation      Comment:  severe  No date: DELAY DEVELOPMENT  No date: DENTITION      Comment:  very poor, severe dental disease  No date: Depression  06/07/2016: Diarrhea  No date: Fibroids  06/13/2016: Gastritis  No date: Headache(784.0)  No date: Hernia of other specified sites of abdominal cavity without   mention of obstruction or gangrene      Comment:  s/p repair  07/09/2021: Hx of colonoscopy      Comment:  Comment: tubular adenoma - 2 small polyps left f/u 1                year Dr Roque  No date: Hyperlipidemia  No date: Hypertension  02/02/2012: Hypothyroid  No date: INSOMNIA  05/07/2010: Major depressive disorder, recurrent episode, moderate   (CMD)  No date: Osteoporosis  05/26/2004: Other candidiasis of other specified sites      Comment:  persistent  No date: Ovarian Cyst  No date: PAIN FOOT  No date: Tubular adenoma of colon      Comment:  repeat colnoscopy due 7/2026 06/07/2016: Weight loss, unintentional      Comment:  74 pounds over past year    Past Surgical History:  No date: Carpal tunnel release      Comment:  bilateral  06/01/2006: Colonoscopy diagnostic      Comment:  normal  06/13/2016: Colonoscopy diagnostic      Comment:  Dr. Moreno  07/09/2021: Colonoscopy diagnostic      Comment:  tubular adenoma Deniset Dr ANTONETTE Brown - normal random                bxs  06/13/2016: Esophagogastroduodenoscopy      Comment:  Dr. Moreno normal  No date: Hemorhoidectomy int ext single column group  No date: Laparoscopic incis hernia repr       (VOLTAREN) 1 % gel apply 2 grams to affected area four times a day      linaCLOtide (Linzess) 145 mcg cap capsule Take 1 Cap by mouth Daily (before breakfast). Indications: chronic idiopathic constipation 30 Cap 1    potassium chloride (K-DUR, KLOR-CON) 20 mEq tablet Take 2 tablets by mouth x 3 days then stop. Indications: low amount of potassium in the blood 6 Tab 0    ALPRAZolam (XANAX) 0.25 mg tablet Take 1 tablet twice a day by oral route as needed for 30 days.  atenoloL-chlorthalidone (TENORETIC) 100-25 mg per tablet Take 1 tablet every day by oral route in the morning.  buPROPion SR (WELLBUTRIN SR) 100 mg SR tablet Take 1 tablet twice a day by oral route for 30 days.  cloNIDine HCL (CATAPRES) 0.1 mg tablet Take 1 tablet every day by oral route at dinner.  pantoprazole (PROTONIX) 40 mg tablet Take 1 tablet every day by oral route.          Allergies   Allergen Reactions    Lisinopril Other (comments)       Social History     Tobacco Use    Smoking status: Never Smoker    Smokeless tobacco: Never Used   Substance Use Topics    Alcohol use: Not Currently     Alcohol/week: 2.0 standard drinks     Types: 2 Cans of beer per week     Frequency: 2-4 times a month     Drinks per session: 3 or 4    Drug use: Not Currently       Family History   Problem Relation Age of Onset    Hypertension Mother     Hypertension Father        Review of Systems:  Positive in BOLD    CONST: Fever, weight loss, fatigue or chills  GI: Nausea, vomiting, abdominal pain, change in bowel habits, hematochezia, melena, and GERD   INTEG: Dermatitis, abnormal moles  HEENT: Recent changes in vision, vertigo, epistaxis, dysphagia and hoarseness  CV: Chest pain, palpitations, HTN, edema and varicosities  RESP: Cough, shortness of breath, wheezing, hemoptysis, snoring and reactive airway disease  : Hematuria, dysuria, frequency, urgency, nocturia and stress urinary incontinence   MS: Weakness, joint pain and Comment:  x 6  05/03/2005: Myocardl perf rest stress      Comment:  normal limits, normal resting LVF, inferior attenuation                versus and inferior myocardial infarction, the former is                favored  No date: Removal gallbladder  No date: Vaginal hysterectomy      Comment:  salpinhgo-oophorectomy due to fibroids and cyst       Prior to Admission medications :  Medication levothyroxine 50 MCG tablet, Sig TAKE ONE (1) TABLET BY MOUTH DAILY., Start Date 9/8/23, End Date , Taking? Yes, Authorizing Provider Violette Soria MD    Medication furosemide (LASIX) 20 MG tablet, Sig TAKE ONE (1) TABLET BY MOUTH DAILY., Start Date 7/28/23, End Date , Taking? Yes, Authorizing Provider Violette Soria MD    Medication valsartan (DIOVAN) 160 MG tablet, Sig TAKE ONE (1) TABLET BY MOUTH DAILY., Start Date 7/28/23, End Date , Taking? Yes, Authorizing Provider Violette Soria MD    Medication rosuvastatin (CRESTOR) 10 MG tablet, Sig TAKE ONE (1) TABLET BY MOUTH DAILY., Start Date 7/28/23, End Date , Taking? Yes, Authorizing Provider Violette Soria MD    Medication ARIPiprazole (ABILIFY) 10 MG tablet, Sig 1 tab po at bedtime for depression, Start Date 6/27/23, End Date , Taking? Yes, Authorizing Provider Xiomy Santiago MD    Medication buPROPion (WELLBUTRIN SR) 100 MG 12 hr tablet, Sig 1 tab po q am and 1 tab po q noon for depression, Start Date 6/27/23, End Date , Taking? Yes, Authorizing Provider Xiomy Santiago MD    Medication sertraline (Zoloft) 50 MG tablet, Sig Take 1 tablet by mouth every morning., Start Date 6/27/23, End Date 6/21/24, Taking? Yes, Authorizing Provider Xiomy Santiago MD    Medication sertraline (ZOLOFT) 100 MG tablet, Sig Take 1 tablet by mouth every morning., Start Date 6/27/23, End Date , Taking? Yes, Authorizing Provider Xiomy Santiago MD    Medication hydroCORTisone (CORTIZONE) 2.5 % cream, Sig Apply to affected areas 2 times daily.  Use up to 2 weeks at a time when red in  arthritis  ENDO: Diabetes, thyroid disease, polyuria, polydipsia, polyphagia, poor wound healing, heat intolerance, cold intolerance  LYMPH/HEME: Anemia, bruising and history of blood transfusions  NEURO: Dizziness, headache, fainting, seizures and stroke  PSYCH: Anxiety and depression    Physical Exam    Visit Vitals  BP (!) 142/86   Pulse 88   Temp 96.9 °F (36.1 °C)   Resp 18   Ht 5' (1.524 m)   Wt 140.2 kg (309 lb)   LMP 07/16/2020   SpO2 96%   BMI 60.35 kg/m²         General: Well developed, well nourished 39 y.o.) female in no acute distress  Head: Normocephalic, atraumatic  Mouth: Clear, no overt lesions, oral mucosa pink and moist  Neck: Supple, no masses, no adenopathy or carotid bruits, trachea midline  Resp: Clear to auscultation bilaterally, now wheeze, rhonchi, or rales, excursions normal and symmetrical  Cardio: Regular rate and rhythm, no murmurs, clicks, gallops, or rubs. No edema or varicosities. Abdomen: Obese, soft, nontender, nondistended, normoactive bowel sounds, no hernias, no hepatosplenomegaly,  Extremities: Warm, well perfused, no tenderness or swelling, normal gait/station  Neuro: Sensation and strength grossly intact and symmetrical  Psych: Alert and oriented to person, place, and time. October 30, 2020 CBC, CMP, cholesterol panel, TSH,, B1, B12, folate, iron, vitamin D, H. pylori serology: Hemoglobin 10.9, cholesterol 227, triglycerides 151, vitamin D 16 with remainder laboratory profile within normal limits. Patient to have receipt of her laboratory profile was begun on vitamin D supplementation.   August 25, 2020 chest x-ray: No active cardiopulmonary disease  June 12, 2020 bilateral mammography: BI-RADS 2, no evidence of malignancy  June 12, 2020 leprechaun ultrasound: Hepatic steatosis, no evidence of cholelithiasis  September 4, 2020 bariatric nutrition/Crystal: Concurring with pursuit of surgery  October 9, 2020 psychology/Hall: Concurring with pursuit of surgery  August 25, 2020 EKG: Normal sinus rhythm with rate of 72-normal EKG    Impression:    Barb Soriano is a 43-year-old black female with a body mass index of 60 and obesity related comorbidities of hypertension, hypercholesterolemia, hypertriglyceridemia, obstructive sleep apnea, and weight related arthropathy of her back and hips who would benefit from bariatric surgery. We've discussed the restrictive and malabsorptive nature of the gastric bypass and compared and contrasted with the sleeve gastrectomy. The patient understands the likelihood of losing approximately 80% of their excess weight in 12 to 18 months. She also understands the risks including but not limited to bleeding, infection, need for reoperation, anastomotic ulcers, leaks and strictures, bowel obstruction secondary to adhesions and internal hernias, DVT, PE, heart attack, stroke, and death. Patient also understands risks of inadequate weight loss, excess weight loss, vitamin insufficiency, protein malnutrition, excess skin, and loss of hair. We have reviewed the components of a successful postoperative course including requirement for a high protein, low carbohydrate diet, 60 oz a day of zero calorie liquids, daily vitamin supplementation, daily exercise, regular follow-up, and participation in support groups. We have reviewed the preoperative liver shrinking clear liquid diet, as well as reviewed any medication changes required while on the clear liquid diet. In addition, the patient understands that all medications to be taken during the first 8 weeks postoperatively can be taken whole as long as the medication is approximately the size of a Estephania 325 mg aspirin tablet in size. The patient further understands that it is his/her responsibility to review these and verify with their primary care doctor and pharmacist that all medications are of the appropriate size.   We will schedule the patient for laparoscopic gastric bypassin the near skin folds, Start Date 5/19/23, End Date , Taking? Yes, Authorizing Provider Uriel Allison MD    Medication potassium chloride (KLOR-CON SPRINKLE) 10 MEQ ER capsule, Sig TAKE ONE (1) TABLET BY MOUTH DAILY., Start Date 4/25/23, End Date , Taking? Yes, Authorizing Provider Violette Soria MD    Medication Vitamin D, Ergocalciferol, 1.25 mg (50,000 units) capsule, Sig Take 1 capsule by mouth every 14 days., Start Date 3/17/23, End Date , Taking? Yes, Authorizing Provider Violette Soria MD    Medication nystatin (MYCOSTATIN) 889148 UNIT/GM powder, Sig Apply 1 application topically 3 times daily., Start Date 9/28/22, End Date , Taking? Yes, Authorizing Provider Violette Soria MD    Medication nystatin (MYCOSTATIN) 287470 UNIT/GM cream, Sig Apply to the affected area twice daily, Start Date 9/7/22, End Date , Taking? Yes, Authorizing Provider Violette Soria MD    Medication Probiotic Product (PROBIOTIC & ACIDOPHILUS EX ST) Cap, Sig 2 tabs daily, Start Date 9/28/17, End Date , Taking? Yes, Authorizing Provider Violette Soria MD         Patient Vitals in the past 24 hrs:  09/14/23 0627, BP:(!) 161/106, Temp:36.6 °C (97.9 °F), Temp src:Temporal, Pulse:(!) 105, Resp:20, SpO2:94 %, Height:5' 2\" (1.575 m), Weight:99.6 kg (219 lb 8 oz)      Relevant Problems   No relevant active problems       Physical Exam     Airway   Mallampati: III  TM Distance: >3 FB  Neck ROM: Limited    Cardiovascular    Cardio Rhythm: Regular  Cardio Rate: Normal    Head Assessment  Head assessment: Normocephalic    General Assessment  General Assessment: Alert and oriented and No acute distress    Dental Exam    Patient has:  Poor Dentition and Denied broken/chipped/loose teeth    Pulmonary Exam    Breath sounds clear to auscultation:  Yes    Abdominal Exam    Patient Demonstrates:  Obese      Anesthesia Plan:    ASA Status: 3  Anesthesia Type: MAC    Induction: Intravenous  Preferred Airway Type: Nasal Cannula  Maintenance:  TIVA  Premedication: None      Post-op Pain Management: Per Surgeon      Checklist  Reviewed: Lab Results, EKG, Chest X-Rays, Nursing Notes, Patient Summary, DNR Status, Medications, Allergies, Past Med History and Problem list  Consent/Risks Discussed Statement:  The proposed anesthetic plan, including its risks and benefits, have been discussed with the Patient along with the risks and benefits of alternatives. Questions were encouraged and answered and the patient and/or representative understands and agrees to proceed.        I discussed with the patient (and/or patient's legal representative) the risks and benefits of the proposed anesthesia plan, MAC, which may include services performed by other anesthesia providers.    Alternative anesthesia plans, if available, were reviewed with the patient (and/or patient's legal representative). Discussion has been held with the patient (and/or patient's legal representative) regarding risks of anesthesia, which include Nausea, Vomiting, Allergic Reaction and Intra-operative Awareness and emergent situations that may require change in anesthesia plan.    The patient (and/or patient's legal representative) has indicated understanding, his/her questions have been answered, and he/she wishes to proceed with the planned anesthetic.    Blood Products: Not Anticipated     future. Oculoplastic Surgeon Procedure Text (B): After obtaining clear surgical margins the patient was sent to oculoplastics for surgical repair.  The patient understands they will receive post-surgical care and follow-up from the referring physician's office.

## 2023-09-20 ENCOUNTER — PATIENT MESSAGE (OUTPATIENT)
Age: 48
End: 2023-09-20

## 2023-09-20 DIAGNOSIS — E66.01 MORBID OBESITY (HCC): Primary | ICD-10-CM

## 2023-10-17 ENCOUNTER — TRANSCRIBE ORDERS (OUTPATIENT)
Facility: HOSPITAL | Age: 48
End: 2023-10-17

## 2023-10-17 DIAGNOSIS — Z12.31 VISIT FOR SCREENING MAMMOGRAM: Primary | ICD-10-CM

## 2023-10-19 ENCOUNTER — CLINICAL DOCUMENTATION (OUTPATIENT)
Age: 48
End: 2023-10-19

## 2023-10-19 NOTE — PROGRESS NOTES
Diet & Exercise Plan:      High protein/low carb bariatric diet, focusing on smaller portion sizes and limiting snacking. Tracking food intake via paper and pen or yuri like [employer]. Aim for at least 150 min exercise (walking/cardio) weekly.

## 2023-11-14 ENCOUNTER — HOSPITAL ENCOUNTER (EMERGENCY)
Age: 48
Discharge: HOME | End: 2023-11-14
Attending: FAMILY MEDICINE
Payer: COMMERCIAL

## 2023-11-14 VITALS
TEMPERATURE: 99 F | WEIGHT: 227 LBS | SYSTOLIC BLOOD PRESSURE: 156 MMHG | BODY MASS INDEX: 44.57 KG/M2 | OXYGEN SATURATION: 100 % | HEIGHT: 60 IN | HEART RATE: 75 BPM | DIASTOLIC BLOOD PRESSURE: 82 MMHG | RESPIRATION RATE: 18 BRPM

## 2023-11-14 DIAGNOSIS — T78.40XA ALLERGY, INITIAL ENCOUNTER: ICD-10-CM

## 2023-11-14 DIAGNOSIS — R09.81 SINUS CONGESTION: Primary | ICD-10-CM

## 2023-11-14 PROCEDURE — 6370000000 HC RX 637 (ALT 250 FOR IP): Performed by: FAMILY MEDICINE

## 2023-11-14 PROCEDURE — 99283 EMERGENCY DEPT VISIT LOW MDM: CPT

## 2023-11-14 RX ORDER — IBUPROFEN 400 MG/1
400 TABLET ORAL ONCE
Status: COMPLETED | OUTPATIENT
Start: 2023-11-14 | End: 2023-11-14

## 2023-11-14 RX ADMIN — IBUPROFEN 400 MG: 400 TABLET, FILM COATED ORAL at 15:21

## 2023-11-14 ASSESSMENT — ENCOUNTER SYMPTOMS
NAUSEA: 0
VOMITING: 0
SHORTNESS OF BREATH: 0
RHINORRHEA: 1

## 2023-11-14 ASSESSMENT — PAIN DESCRIPTION - LOCATION: LOCATION: HEAD

## 2023-11-14 ASSESSMENT — LIFESTYLE VARIABLES
HOW MANY STANDARD DRINKS CONTAINING ALCOHOL DO YOU HAVE ON A TYPICAL DAY: PATIENT DOES NOT DRINK
HOW OFTEN DO YOU HAVE A DRINK CONTAINING ALCOHOL: NEVER

## 2023-11-14 ASSESSMENT — PAIN SCALES - GENERAL: PAINLEVEL_OUTOF10: 1

## 2023-11-14 ASSESSMENT — PAIN - FUNCTIONAL ASSESSMENT: PAIN_FUNCTIONAL_ASSESSMENT: 0-10

## 2023-11-14 NOTE — DISCHARGE INSTRUCTIONS
As you spoke, I have high suspicion this is more an allergy issue. Recommend using some over-the-counter Zyrtec. At this point CT or antibiotics not warranted. Try the Zyrtec for a few days to see if this does not help if not follow-up with your primary care doctor. Return to the emergency department if you have any questions or concerns.

## 2023-11-14 NOTE — ED TRIAGE NOTES
Patient states that symptoms of pressure behind her eyes, headache, lightheadedness, nasal congestion and generally not feeling well began this morning at approximately 0001. Denies chest pain, shortness of breath, vomiting or diarrhea.

## 2023-11-14 NOTE — ED NOTES
Discharge instructions reviewed with patient. Patient advised to follow up as directed on discharge instructions. Patient denies questions, needs or concerns at this time. Patient verbalized understanding. No s/sx of distress noted.        David Osorio RN  11/14/23 [id number]

## 2023-11-20 ENCOUNTER — OFFICE VISIT (OUTPATIENT)
Age: 48
End: 2023-11-20
Payer: COMMERCIAL

## 2023-11-20 VITALS
HEART RATE: 86 BPM | RESPIRATION RATE: 18 BRPM | WEIGHT: 222 LBS | TEMPERATURE: 97.2 F | HEIGHT: 60 IN | DIASTOLIC BLOOD PRESSURE: 84 MMHG | SYSTOLIC BLOOD PRESSURE: 146 MMHG | BODY MASS INDEX: 43.59 KG/M2

## 2023-11-20 DIAGNOSIS — E66.01 MORBID OBESITY (HCC): ICD-10-CM

## 2023-11-20 DIAGNOSIS — Z98.84 S/P GASTRIC BYPASS: ICD-10-CM

## 2023-11-20 DIAGNOSIS — K91.2 POSTOPERATIVE INTESTINAL MALABSORPTION: Primary | ICD-10-CM

## 2023-11-20 PROCEDURE — 99214 OFFICE O/P EST MOD 30 MIN: CPT | Performed by: NURSE PRACTITIONER

## 2023-11-20 RX ORDER — TOPIRAMATE 25 MG/1
25 TABLET ORAL DAILY
Qty: 60 TABLET | Refills: 1 | Status: SHIPPED | OUTPATIENT
Start: 2023-11-20

## 2023-11-20 NOTE — PROGRESS NOTES
Bariatric Postoperative Note    CC: Weight Management    Nadiya Oglesby is a 57 y.o. female now 3 years status post laparoscopic gastric bypass surgery performed on 9/23/2020.  -7 lbs since last visit in May  Doing well overall. Focusing on high protein/low carb bariatric diet. Taking in 64 oz sugar free fluids,  60 g protein. 60 min of activity 5 days a week. Bowel movements are alternating constipation and regularity. She is compliant with recommended bariatric vitamin supplementation. Weight deisy of 217 since last visit  Active at work at [employer]. Took Contrave for one week, experienced HA and elevated BP and stopped medication due to SE.       11/20/2023     3:18 PM 11/14/2023     2:41 PM 5/1/2023    11:35 AM 3/17/2023     1:15 PM 2/27/2023    10:08 PM 1/16/2023     2:00 PM 12/5/2022     2:19 PM   Weight Loss Metrics   Pre-op weight (manual entry) 309 lbs  309 lbs 309 lbs      Height 5' 0\" 5' 0\" 5' 0\" 5' 0\" 5' 0\" 5' 0\" 5' 0\"   Weight - Scale 222 lbs 227 lbs 229 lbs 234 lbs 229 lbs 229 lbs 236 lbs   BMI (Calculated) 43.4 kg/m2 44.4 kg/m2 44.8 kg/m2 45.8 kg/m2 44.8 kg/m2 44.8 kg/m2 46.2 kg/m2   Ideal body weight (manual entry) 120 lbs  119 lbs 119 lbs      EBW in lbs. 189  190 190      Weight loss to date in lbs. 87  80 75      Percent weight loss (%) 28.16 %  25.89 % 24.27 %      Percent EBW loss (%) 46 %  42.1 % 39.5 %            Past Medical History:   Diagnosis Date    Anxiety and depression     Back pain     Constipation     Diverticulitis     Fibroids     GERD (gastroesophageal reflux disease)     HTN (hypertension)     no meds    Knee pain     Menopause     Morbid obesity (HCC)     OA (osteoarthritis)     of knees       Past Surgical History:   Procedure Laterality Date    BREAST REDUCTION SURGERY  2016?     BREAST SURGERY      reduction    COLONOSCOPY      GASTRIC BYPASS SURGERY      GASTRIC BYPASS SURGERY  11/30/2020    GYN      btl and partial hysterectomy    HYSTERECTOMY (CERVIX STATUS

## 2023-11-27 ASSESSMENT — ENCOUNTER SYMPTOMS
CONSTIPATION: 0
COUGH: 0
VOMITING: 0
ABDOMINAL PAIN: 0
SHORTNESS OF BREATH: 0
NAUSEA: 0
DIARRHEA: 0

## 2023-12-02 ENCOUNTER — HOSPITAL ENCOUNTER (EMERGENCY)
Age: 48
Discharge: HOME | End: 2023-12-02
Attending: EMERGENCY MEDICINE
Payer: COMMERCIAL

## 2023-12-02 VITALS
BODY MASS INDEX: 42.6 KG/M2 | TEMPERATURE: 99.5 F | OXYGEN SATURATION: 100 % | DIASTOLIC BLOOD PRESSURE: 95 MMHG | WEIGHT: 217 LBS | RESPIRATION RATE: 19 BRPM | HEIGHT: 60 IN | HEART RATE: 95 BPM | SYSTOLIC BLOOD PRESSURE: 159 MMHG

## 2023-12-02 DIAGNOSIS — J06.9 VIRAL URI WITH COUGH: Primary | ICD-10-CM

## 2023-12-02 LAB
FLUAV AG NPH QL IA: NEGATIVE
FLUBV AG NOSE QL IA: NEGATIVE

## 2023-12-02 PROCEDURE — 99283 EMERGENCY DEPT VISIT LOW MDM: CPT

## 2023-12-02 PROCEDURE — 87804 INFLUENZA ASSAY W/OPTIC: CPT

## 2023-12-02 RX ORDER — CETIRIZINE HYDROCHLORIDE 10 MG/1
10 TABLET ORAL DAILY
Qty: 10 TABLET | Refills: 0 | Status: SHIPPED | OUTPATIENT
Start: 2023-12-02 | End: 2023-12-12

## 2023-12-02 RX ORDER — IBUPROFEN 600 MG/1
600 TABLET ORAL 4 TIMES DAILY PRN
Qty: 20 TABLET | Refills: 0 | Status: SHIPPED | OUTPATIENT
Start: 2023-12-02 | End: 2023-12-07

## 2023-12-02 RX ORDER — BENZONATATE 100 MG/1
100 CAPSULE ORAL 2 TIMES DAILY PRN
Qty: 10 CAPSULE | Refills: 0 | Status: SHIPPED | OUTPATIENT
Start: 2023-12-02 | End: 2023-12-07

## 2023-12-02 ASSESSMENT — ENCOUNTER SYMPTOMS
COUGH: 1
GASTROINTESTINAL NEGATIVE: 1

## 2023-12-02 ASSESSMENT — LIFESTYLE VARIABLES
HOW OFTEN DO YOU HAVE A DRINK CONTAINING ALCOHOL: 2-4 TIMES A MONTH
HOW MANY STANDARD DRINKS CONTAINING ALCOHOL DO YOU HAVE ON A TYPICAL DAY: 1 OR 2

## 2023-12-02 ASSESSMENT — PAIN - FUNCTIONAL ASSESSMENT: PAIN_FUNCTIONAL_ASSESSMENT: NONE - DENIES PAIN

## 2023-12-02 NOTE — ED PROVIDER NOTES
[facility] EMERGENCY DEPT  EMERGENCY DEPARTMENT HISTORY AND PHYSICAL EXAM      Date: 12/2/2023  Patient Name: Vj Valentin  MRN: [id number]  [address] [facility] 1975  Date of evaluation: 12/2/2023  Provider: Fanta Awan MD   Note Started: 1:52 PM 12/2/23    HISTORY OF PRESENT ILLNESS     Chief Complaint   Patient presents with    Cough       History Provided By: Patient    HPI: Vj Valentin, 57 y.o. female PMHx Diverticulitis, HTN, GERD, Obesity, OA presents with cough, runny nose,loss of smell. It started 5 days ago. She has had body aches and fatigue. No NVD or abdominal pain. Her symptoms are acute and mild. HPI        PAST MEDICAL HISTORY   Past Medical History:  Past Medical History:   Diagnosis Date    Anxiety and depression     Back pain     Constipation     Diverticulitis     Fibroids     GERD (gastroesophageal reflux disease)     HTN (hypertension)     no meds    Knee pain     Menopause     Morbid obesity ([address])     OA (osteoarthritis)     of knees       Past Surgical History:  Past Surgical History:   Procedure Laterality Date    BREAST REDUCTION SURGERY  2016? BREAST SURGERY      reduction    COLONOSCOPY      GASTRIC BYPASS SURGERY      GASTRIC BYPASS SURGERY  11/30/2020    GYN      btl and partial hysterectomy    HYSTERECTOMY (CERVIX STATUS UNKNOWN)  Sept?  2020    KAMILAH done at [facility] w/ Dr Vicki Wong         Family History:  Family History   Problem Relation Age of Onset    Hypertension Mother     Hypertension Father        Social History:  Social History     Tobacco Use    Smoking status: Never    Smokeless tobacco: Never   Vaping Use    Vaping Use: Never used   Substance Use Topics    Alcohol use: Yes     Alcohol/week: 1.0 standard drink of alcohol     Types: 1 Shots of liquor per week     Comment: twice a month    Drug use: Not Currently       Allergies:   Allergies   Allergen Reactions    Lisinopril Other (See Comments) and Swelling     Swollen lips  Other reaction(s): Not

## 2023-12-02 NOTE — ED TRIAGE NOTES
Pt reports cough and runny nose since Thursday along with fatigue. Reports loss of taste and smell two days ago.

## 2024-01-04 ENCOUNTER — HOSPITAL ENCOUNTER (OUTPATIENT)
Age: 49
Discharge: HOME | End: 2024-01-04
Payer: COMMERCIAL

## 2024-01-04 DIAGNOSIS — K91.2 POSTOPERATIVE INTESTINAL MALABSORPTION: ICD-10-CM

## 2024-01-04 DIAGNOSIS — Z98.84 S/P GASTRIC BYPASS: ICD-10-CM

## 2024-01-04 DIAGNOSIS — E66.01 MORBID OBESITY (HCC): ICD-10-CM

## 2024-01-04 LAB
25(OH)D3 SERPL-MCNC: 28.2 NG/ML (ref 30–100)
ALBUMIN SERPL-MCNC: 3.4 G/DL (ref 3.4–5)
ALBUMIN/GLOB SERPL: 0.9 (ref 0.8–1.7)
ALP SERPL-CCNC: 33 U/L (ref 45–117)
ALT SERPL-CCNC: 15 U/L (ref 13–56)
ANION GAP SERPL CALC-SCNC: 5 MMOL/L (ref 3–18)
AST SERPL W P-5'-P-CCNC: 12 U/L (ref 10–38)
BASOPHILS # BLD: 0.1 K/UL (ref 0–0.1)
BASOPHILS NFR BLD: 1 % (ref 0–2)
BILIRUB SERPL-MCNC: 0.2 MG/DL (ref 0.2–1)
BUN SERPL-MCNC: 7 MG/DL (ref 7–18)
BUN/CREAT SERPL: 10 (ref 12–20)
CA-I BLD-MCNC: 9.2 MG/DL (ref 8.5–10.1)
CHLORIDE SERPL-SCNC: 105 MMOL/L (ref 100–111)
CO2 SERPL-SCNC: 29 MMOL/L (ref 21–32)
CREAT SERPL-MCNC: 0.68 MG/DL (ref 0.6–1.3)
DIFFERENTIAL METHOD BLD: ABNORMAL
EOSINOPHIL # BLD: 0.1 K/UL (ref 0–0.4)
EOSINOPHIL NFR BLD: 1 % (ref 0–5)
ERYTHROCYTE [DISTWIDTH] IN BLOOD BY AUTOMATED COUNT: 12.3 % (ref 11.6–14.5)
EST. AVERAGE GLUCOSE BLD GHB EST-MCNC: 105 MG/DL
FERRITIN SERPL-MCNC: 69 NG/ML (ref 8–388)
GLOBULIN SER CALC-MCNC: 4 G/DL (ref 2–4)
GLUCOSE SERPL-MCNC: 102 MG/DL (ref 74–99)
HBA1C MFR BLD: 5.3 % (ref 4.2–5.6)
HCT VFR BLD AUTO: 35.5 % (ref 35–45)
HGB BLD-MCNC: 11.7 G/DL (ref 12–16)
IMM GRANULOCYTES # BLD AUTO: 0 K/UL (ref 0–0.04)
IMM GRANULOCYTES NFR BLD AUTO: 0 % (ref 0–0.5)
IRON SERPL-MCNC: 68 UG/DL (ref 50–175)
LYMPHOCYTES # BLD: 3.4 K/UL (ref 0.9–3.6)
LYMPHOCYTES NFR BLD: 40 % (ref 21–52)
MCH RBC QN AUTO: 30.6 PG (ref 24–34)
MCHC RBC AUTO-ENTMCNC: 33 G/DL (ref 31–37)
MCV RBC AUTO: 92.9 FL (ref 78–100)
MONOCYTES # BLD: 0.7 K/UL (ref 0.05–1.2)
MONOCYTES NFR BLD: 8 % (ref 3–10)
NEUTS SEG # BLD: 4.3 K/UL (ref 1.8–8)
NEUTS SEG NFR BLD: 50 % (ref 40–73)
NRBC # BLD: 0 K/UL (ref 0–0.01)
NRBC BLD-RTO: 0 PER 100 WBC
PLATELET # BLD AUTO: 297 K/UL (ref 135–420)
PMV BLD AUTO: 9.9 FL (ref 9.2–11.8)
POTASSIUM SERPL-SCNC: 3.9 MMOL/L (ref 3.5–5.5)
PROT SERPL-MCNC: 7.4 G/DL (ref 6.4–8.2)
RBC # BLD AUTO: 3.82 M/UL (ref 4.2–5.3)
SODIUM SERPL-SCNC: 139 MMOL/L (ref 136–145)
VIT B12 SERPL-MCNC: 551 PG/ML (ref 211–911)
WBC # BLD AUTO: 8.5 K/UL (ref 4.6–13.2)

## 2024-01-04 PROCEDURE — 85025 COMPLETE CBC W/AUTO DIFF WBC: CPT

## 2024-01-04 PROCEDURE — 80053 COMPREHEN METABOLIC PANEL: CPT

## 2024-01-04 PROCEDURE — 82728 ASSAY OF FERRITIN: CPT

## 2024-01-04 PROCEDURE — 83550 IRON BINDING TEST: CPT

## 2024-01-04 PROCEDURE — 84425 ASSAY OF VITAMIN B-1: CPT

## 2024-01-04 PROCEDURE — 82306 VITAMIN D 25 HYDROXY: CPT

## 2024-01-04 PROCEDURE — 83036 HEMOGLOBIN GLYCOSYLATED A1C: CPT

## 2024-01-04 PROCEDURE — 83540 ASSAY OF IRON: CPT

## 2024-01-04 PROCEDURE — 36415 COLL VENOUS BLD VENIPUNCTURE: CPT

## 2024-01-04 PROCEDURE — 82607 VITAMIN B-12: CPT

## 2024-01-07 LAB — VIT B1 BLD-SCNC: 101.8 NMOL/L (ref 66.5–200)

## 2024-04-11 ENCOUNTER — HOSPITAL ENCOUNTER (EMERGENCY)
Age: 49
Discharge: HOME | End: 2024-04-11
Attending: EMERGENCY MEDICINE
Payer: COMMERCIAL

## 2024-04-11 VITALS
RESPIRATION RATE: 14 BRPM | BODY MASS INDEX: 43 KG/M2 | TEMPERATURE: 98.1 F | OXYGEN SATURATION: 99 % | HEART RATE: 76 BPM | HEIGHT: 60 IN | DIASTOLIC BLOOD PRESSURE: 92 MMHG | WEIGHT: 219 LBS | SYSTOLIC BLOOD PRESSURE: 129 MMHG

## 2024-04-11 DIAGNOSIS — R55 SYNCOPE AND COLLAPSE: Primary | ICD-10-CM

## 2024-04-11 DIAGNOSIS — N30.00 ACUTE CYSTITIS WITHOUT HEMATURIA: ICD-10-CM

## 2024-04-11 LAB
ANION GAP SERPL CALC-SCNC: 9 MMOL/L (ref 3–18)
APPEARANCE UR: ABNORMAL
BACTERIA URNS QL MICRO: ABNORMAL /HPF
BASOPHILS # BLD: 0 K/UL (ref 0–0.1)
BASOPHILS NFR BLD: 1 % (ref 0–2)
BILIRUB UR QL: NEGATIVE
BUN SERPL-MCNC: 10 MG/DL (ref 7–18)
BUN/CREAT SERPL: 19 (ref 12–20)
CA-I BLD-MCNC: 9.5 MG/DL (ref 8.5–10.1)
CHLORIDE SERPL-SCNC: 105 MMOL/L (ref 100–111)
CO2 SERPL-SCNC: 25 MMOL/L (ref 21–32)
COLOR UR: YELLOW
CREAT SERPL-MCNC: 0.52 MG/DL (ref 0.6–1.3)
DIFFERENTIAL METHOD BLD: ABNORMAL
EKG ATRIAL RATE: 87 BPM
EKG DIAGNOSIS: NORMAL
EKG P AXIS: 60 DEGREES
EKG P-R INTERVAL: 178 MS
EKG Q-T INTERVAL: 378 MS
EKG QRS DURATION: 86 MS
EKG QTC CALCULATION (BAZETT): 454 MS
EKG R AXIS: -17 DEGREES
EKG T AXIS: 32 DEGREES
EKG VENTRICULAR RATE: 87 BPM
EOSINOPHIL # BLD: 0 K/UL (ref 0–0.4)
EOSINOPHIL NFR BLD: 1 % (ref 0–5)
EPITH CASTS URNS QL MICRO: ABNORMAL /LPF (ref 0–20)
ERYTHROCYTE [DISTWIDTH] IN BLOOD BY AUTOMATED COUNT: 12.3 % (ref 11.6–14.5)
GLUCOSE BLD STRIP.AUTO-MCNC: 106 MG/DL (ref 70–110)
GLUCOSE SERPL-MCNC: 105 MG/DL (ref 74–99)
GLUCOSE UR STRIP.AUTO-MCNC: 100 MG/DL
HCT VFR BLD AUTO: 39.2 % (ref 35–45)
HGB BLD-MCNC: 13.2 G/DL (ref 12–16)
HGB UR QL STRIP: ABNORMAL
IMM GRANULOCYTES # BLD AUTO: 0 K/UL (ref 0–0.04)
IMM GRANULOCYTES NFR BLD AUTO: 0 % (ref 0–0.5)
KETONES UR QL STRIP.AUTO: NEGATIVE MG/DL
LEUKOCYTE ESTERASE UR QL STRIP.AUTO: NEGATIVE
LYMPHOCYTES # BLD: 2.6 K/UL (ref 0.9–3.6)
LYMPHOCYTES NFR BLD: 32 % (ref 21–52)
MCH RBC QN AUTO: 31.4 PG (ref 24–34)
MCHC RBC AUTO-ENTMCNC: 33.7 G/DL (ref 31–37)
MCV RBC AUTO: 93.1 FL (ref 78–100)
MONOCYTES # BLD: 0.9 K/UL (ref 0.05–1.2)
MONOCYTES NFR BLD: 11 % (ref 3–10)
NEUTS SEG # BLD: 4.6 K/UL (ref 1.8–8)
NEUTS SEG NFR BLD: 55 % (ref 40–73)
NITRITE UR QL STRIP.AUTO: POSITIVE
NRBC # BLD: 0 K/UL (ref 0–0.01)
NRBC BLD-RTO: 0 PER 100 WBC
PERFORMED BY:: NORMAL
PH UR STRIP: 5.5 (ref 5–8)
PLATELET # BLD AUTO: 274 K/UL (ref 135–420)
PMV BLD AUTO: 9.7 FL (ref 9.2–11.8)
POTASSIUM SERPL-SCNC: 3.9 MMOL/L (ref 3.5–5.5)
PROT UR STRIP-MCNC: NEGATIVE MG/DL
RBC # BLD AUTO: 4.21 M/UL (ref 4.2–5.3)
RBC #/AREA URNS HPF: ABNORMAL /HPF (ref 0–2)
SODIUM SERPL-SCNC: 139 MMOL/L (ref 136–145)
SP GR UR REFRACTOMETRY: 1.02 (ref 1–1.03)
TROPONIN I SERPL HS-MCNC: 4 NG/L (ref 0–54)
UROBILINOGEN UR QL STRIP.AUTO: 0.2 EU/DL (ref 0.2–1)
WBC # BLD AUTO: 8.2 K/UL (ref 4.6–13.2)
WBC URNS QL MICRO: ABNORMAL /HPF (ref 0–4)

## 2024-04-11 PROCEDURE — 84484 ASSAY OF TROPONIN QUANT: CPT

## 2024-04-11 PROCEDURE — 82962 GLUCOSE BLOOD TEST: CPT

## 2024-04-11 PROCEDURE — 81001 URINALYSIS AUTO W/SCOPE: CPT

## 2024-04-11 PROCEDURE — 99284 EMERGENCY DEPT VISIT MOD MDM: CPT

## 2024-04-11 PROCEDURE — 80048 BASIC METABOLIC PNL TOTAL CA: CPT

## 2024-04-11 PROCEDURE — 2580000003 HC RX 258: Performed by: EMERGENCY MEDICINE

## 2024-04-11 PROCEDURE — 6370000000 HC RX 637 (ALT 250 FOR IP): Performed by: EMERGENCY MEDICINE

## 2024-04-11 PROCEDURE — 85025 COMPLETE CBC W/AUTO DIFF WBC: CPT

## 2024-04-11 PROCEDURE — 93005 ELECTROCARDIOGRAM TRACING: CPT | Performed by: EMERGENCY MEDICINE

## 2024-04-11 RX ORDER — CEPHALEXIN 500 MG/1
500 CAPSULE ORAL 2 TIMES DAILY
Qty: 14 CAPSULE | Refills: 0 | Status: SHIPPED | OUTPATIENT
Start: 2024-04-11 | End: 2024-04-18

## 2024-04-11 RX ORDER — CEPHALEXIN 250 MG/1
500 CAPSULE ORAL
Status: COMPLETED | OUTPATIENT
Start: 2024-04-11 | End: 2024-04-11

## 2024-04-11 RX ORDER — 0.9 % SODIUM CHLORIDE 0.9 %
1000 INTRAVENOUS SOLUTION INTRAVENOUS ONCE
Status: COMPLETED | OUTPATIENT
Start: 2024-04-11 | End: 2024-04-11

## 2024-04-11 RX ADMIN — SODIUM CHLORIDE 1000 ML: 9 INJECTION, SOLUTION INTRAVENOUS at 06:51

## 2024-04-11 RX ADMIN — CEPHALEXIN 500 MG: 250 CAPSULE ORAL at 09:35

## 2024-04-11 ASSESSMENT — PAIN - FUNCTIONAL ASSESSMENT: PAIN_FUNCTIONAL_ASSESSMENT: NONE - DENIES PAIN

## 2024-04-11 NOTE — ED PROVIDER NOTES
[facility] EMERGENCY DEPT  EMERGENCY DEPARTMENT ENCOUNTER      Pt Name: Jocelyne Borrero  MRN: [id number]  Birthdate 1975  Date of evaluation: 4/11/2024  Provider: Raheem Romero MD    CHIEF COMPLAINT       Chief Complaint   Patient presents with    Loss of Consciousness       HPI:  Jocelyne Borrero is a 48 y.o. female who presents to the emergency department pt c/o standing in kitchen, started feeling lightheaded, then passed out.  Unsure how long loc occurred, was unwitnessed, just pta.  Had no pain.  Woke up on floor, had no sx's.  No pain or complaints since. No prior syncope.  No bleeding. No fever.  Mild nasal congestion x 2 days.  No cough.no sob  Prior h/o htn, but hasn't required meds for 2 years  H/o hysterectomy.      HPI    Nursing Notes were reviewed.    REVIEW OF SYSTEMS    (2-9 systems for level 4, 10 or more for level 5)     Review of Systems    Except as noted above the remainder of the review of systems was reviewed and negative.       PAST MEDICAL HISTORY     Past Medical History:   Diagnosis Date    Anxiety and depression     Back pain     Constipation     Diverticulitis     Fibroids     GERD (gastroesophageal reflux disease)     HTN (hypertension)     no meds    Knee pain     Menopause     Morbid obesity (HCC)     OA (osteoarthritis)     of knees         SURGICAL HISTORY       Past Surgical History:   Procedure Laterality Date    BREAST REDUCTION SURGERY  2016?    BREAST SURGERY      reduction    COLONOSCOPY      GASTRIC BYPASS SURGERY      GASTRIC BYPASS SURGERY  11/30/2020    GYN      btl and partial hysterectomy    HYSTERECTOMY (CERVIX STATUS UNKNOWN)  Sept?  2020    KAMILAH done at [facility] w/ Dr Wheatley    OVARY REMOVAL           CURRENT MEDICATIONS       Previous Medications    IBUPROFEN (ADVIL;MOTRIN) 600 MG TABLET    Take 1 tablet by mouth 4 times daily as needed for Pain    MULTIPLE VITAMINS-MINERALS (BARIATRIC MULTIVITAMINS/IRON PO)    Take by mouth daily [facility]

## 2024-04-11 NOTE — ED TRIAGE NOTES
Pt. States 20-30 minutes ago she was standing in her kitchen when she became lightheaded and dizzy. pt. States she went to reach for her kitchen chair and fell to the floor. Pt. Is unaware of whether she lost total consciousness or not. Pt. Complains of feeling shaky at this time.

## 2024-04-12 ENCOUNTER — OFFICE VISIT (OUTPATIENT)
Facility: CLINIC | Age: 49
End: 2024-04-12
Payer: COMMERCIAL

## 2024-04-12 VITALS
WEIGHT: 223.8 LBS | SYSTOLIC BLOOD PRESSURE: 133 MMHG | DIASTOLIC BLOOD PRESSURE: 88 MMHG | OXYGEN SATURATION: 98 % | HEIGHT: 60 IN | HEART RATE: 72 BPM | TEMPERATURE: 97.8 F | BODY MASS INDEX: 43.94 KG/M2 | RESPIRATION RATE: 18 BRPM

## 2024-04-12 DIAGNOSIS — Z00.01 ENCOUNTER FOR WELL ADULT EXAM WITH ABNORMAL FINDINGS: Primary | ICD-10-CM

## 2024-04-12 DIAGNOSIS — R55 SYNCOPE, UNSPECIFIED SYNCOPE TYPE: ICD-10-CM

## 2024-04-12 DIAGNOSIS — E66.01 CLASS 3 SEVERE OBESITY DUE TO EXCESS CALORIES WITH SERIOUS COMORBIDITY AND BODY MASS INDEX (BMI) OF 40.0 TO 44.9 IN ADULT (HCC): ICD-10-CM

## 2024-04-12 DIAGNOSIS — R06.83 SNORING: ICD-10-CM

## 2024-04-12 DIAGNOSIS — Z12.31 ENCOUNTER FOR SCREENING MAMMOGRAM FOR MALIGNANT NEOPLASM OF BREAST: ICD-10-CM

## 2024-04-12 PROBLEM — M54.42 ACUTE LEFT-SIDED LOW BACK PAIN WITH LEFT-SIDED SCIATICA: Status: RESOLVED | Noted: 2021-03-01 | Resolved: 2024-04-12

## 2024-04-12 PROBLEM — Z90.710 STATUS POST LAPAROSCOPIC HYSTERECTOMY: Status: ACTIVE | Noted: 2020-09-11

## 2024-04-12 PROBLEM — E87.6 HYPOKALEMIA: Status: RESOLVED | Noted: 2020-09-08 | Resolved: 2024-04-12

## 2024-04-12 PROBLEM — E66.9 OBESE: Status: ACTIVE | Noted: 2020-06-12

## 2024-04-12 PROCEDURE — 99214 OFFICE O/P EST MOD 30 MIN: CPT | Performed by: STUDENT IN AN ORGANIZED HEALTH CARE EDUCATION/TRAINING PROGRAM

## 2024-04-12 SDOH — ECONOMIC STABILITY: FOOD INSECURITY: WITHIN THE PAST 12 MONTHS, THE FOOD YOU BOUGHT JUST DIDN'T LAST AND YOU DIDN'T HAVE MONEY TO GET MORE.: NEVER TRUE

## 2024-04-12 SDOH — HEALTH STABILITY: PHYSICAL HEALTH: ON AVERAGE, HOW MANY DAYS PER WEEK DO YOU ENGAGE IN MODERATE TO STRENUOUS EXERCISE (LIKE A BRISK WALK)?: 4 DAYS

## 2024-04-12 SDOH — ECONOMIC STABILITY: FOOD INSECURITY: WITHIN THE PAST 12 MONTHS, YOU WORRIED THAT YOUR FOOD WOULD RUN OUT BEFORE YOU GOT MONEY TO BUY MORE.: NEVER TRUE

## 2024-04-12 SDOH — ECONOMIC STABILITY: HOUSING INSECURITY
IN THE LAST 12 MONTHS, WAS THERE A TIME WHEN YOU DID NOT HAVE A STEADY PLACE TO SLEEP OR SLEPT IN A SHELTER (INCLUDING NOW)?: YES

## 2024-04-12 SDOH — ECONOMIC STABILITY: INCOME INSECURITY: HOW HARD IS IT FOR YOU TO PAY FOR THE VERY BASICS LIKE FOOD, HOUSING, MEDICAL CARE, AND HEATING?: NOT VERY HARD

## 2024-04-12 SDOH — HEALTH STABILITY: PHYSICAL HEALTH: ON AVERAGE, HOW MANY MINUTES DO YOU ENGAGE IN EXERCISE AT THIS LEVEL?: 30 MIN

## 2024-04-12 ASSESSMENT — PATIENT HEALTH QUESTIONNAIRE - PHQ9
SUM OF ALL RESPONSES TO PHQ9 QUESTIONS 1 & 2: 0
1. LITTLE INTEREST OR PLEASURE IN DOING THINGS: NOT AT ALL
SUM OF ALL RESPONSES TO PHQ QUESTIONS 1-9: 0
2. FEELING DOWN, DEPRESSED OR HOPELESS: NOT AT ALL
SUM OF ALL RESPONSES TO PHQ QUESTIONS 1-9: 0

## 2024-04-12 ASSESSMENT — ENCOUNTER SYMPTOMS
RESPIRATORY NEGATIVE: 1
GASTROINTESTINAL NEGATIVE: 1

## 2024-04-12 NOTE — PROGRESS NOTES
Jocelyne Page Borrero presents today for   Chief Complaint   Patient presents with    New Patient     Here to establish care - former [facility] patient       Is someone accompanying this pt? no    Is the patient using any DME equipment during OV? no    Health Maintenance reviewed and discussed and ordered per Provider.    Health Maintenance Due   Topic Date Due    Hepatitis B vaccine (1 of 3 - 3-dose series) Never done    HIV screen  Never done    Hepatitis C screen  Never done    DTaP/Tdap/Td vaccine (1 - Tdap) Never done    Colorectal Cancer Screen  Never done    COVID-19 Vaccine (3 - 2023-24 season) 09/01/2023    Depression Screen  01/16/2024   .      \"Have you been to the ER, urgent care clinic since your last visit?  Hospitalized since your last visit?\"    YES - When: approximately 1 days ago.  Where and Why: [facility] ER UTI.    “Have you seen or consulted any other health care providers outside of [facility] since your last visit?”    NO    “Have you had a colorectal cancer screening such as a colonoscopy/FIT/Cologuard?    YES - Type: Colonoscopy - Where: [facility] - 5 or 6 years ago.. will look through records Nurse/CMA to request most recent records if not in the chart     No colonoscopy on file  No cologuard on file  No FIT/FOBT on file   No flexible sigmoidoscopy on file              
Movements: Extraocular movements intact.      Pupils: Pupils are equal, round, and reactive to light.   Cardiovascular:      Rate and Rhythm: Normal rate and regular rhythm.      Pulses: Normal pulses.      Heart sounds: Normal heart sounds. No murmur heard.     No gallop.   Pulmonary:      Effort: Pulmonary effort is normal.      Breath sounds: Normal breath sounds.   Abdominal:      General: Abdomen is flat.      Palpations: Abdomen is soft.   Musculoskeletal:         General: No swelling, tenderness or deformity.      Cervical back: Neck supple.   Lymphadenopathy:      Cervical: No cervical adenopathy.   Skin:     General: Skin is warm and dry.   Neurological:      General: No focal deficit present.      Mental Status: She is alert. Mental status is at baseline.   Psychiatric:         Mood and Affect: Mood normal.         Behavior: Behavior normal.         Thought Content: Thought content normal.         Judgment: Judgment normal.         Assessment   Plan   1. Encounter for well adult exam with abnormal findings  2. Encounter for screening mammogram for malignant neoplasm of breast  -     ABBIE DIGITAL SCREEN W OR WO CAD BILATERAL; Future  3. Class 3 severe obesity due to excess calories with serious comorbidity and body mass index (BMI) of 40.0 to 44.9 in adult ([facility])  4. Snoring  -     [facility] - Kelly Cooper DO, Sleep Medicine  5. Syncope, unspecified syncope type  -     [facility] - Kelly Cooper DO, Sleep Medicine  -     External Referral To Cardiology    Obesity, S/P weight loss surgery- following with bariatrics. Will follow along  Snoring, obesity- refer to sleep medicine for sleep study. High risk.   Syncope- patient elects cardiac workup. Will refer to ccardiology     - labs reviewed from ER, stable. Mammogram due- will order. Colonoscopy done 5 years ago- poor prep. Likely due for repeat. Will discuss at follow up.       Personalized Preventive Plan   Current Health Maintenance Status  Immunization

## 2024-04-12 NOTE — PATIENT INSTRUCTIONS
Website: [email or web address]                             Starting a Weight Loss Plan: Care Instructions  Overview     It can be a challenge to lose weight. But your doctor can help you make a weight-loss plan that meets your needs.  You don't have to make a lot of big changes at once. A better idea might be to focus on small changes and stick with them. When those changes become habit, you can add a few more changes.  Some people find it helpful to take an exercise or nutrition class. If you have questions, ask your doctor about seeing a registered dietitian or an exercise specialist. You might also think about joining a weight-loss support group.  If you're not ready to make changes right now, try to pick a date in the future. Then make an appointment with your doctor to talk about when and how you'll get started with a plan.  Follow-up care is a key part of your treatment and safety. Be sure to make and go to all appointments, and call your doctor if you are having problems. It's also a good idea to know your test results and keep a list of the medicines you take.  How can you care for yourself at home?  Set realistic goals. Many people expect to lose much more weight than is likely. A weight loss of 5% to 10% of your body weight may be enough to improve your health.  Get family and friends involved to provide support. Talk to them about why you are trying to lose weight, and ask them to help. They can help by participating in exercise and having meals with you, even if they may be eating something different.  Find what works best for you. If you do not have time or do not like to cook, a program that offers meal replacement bars or shakes may be better for you. Or if you like to prepare meals, finding a plan that includes daily menus and recipes may be best.  Ask your doctor about other health professionals who can help you achieve your weight loss goals.  A dietitian can help you make healthy changes

## 2024-06-11 ENCOUNTER — OFFICE VISIT (OUTPATIENT)
Facility: CLINIC | Age: 49
End: 2024-06-11
Payer: COMMERCIAL

## 2024-06-11 VITALS
HEART RATE: 72 BPM | TEMPERATURE: 97.8 F | BODY MASS INDEX: 45 KG/M2 | SYSTOLIC BLOOD PRESSURE: 137 MMHG | HEIGHT: 60 IN | DIASTOLIC BLOOD PRESSURE: 88 MMHG | WEIGHT: 229.2 LBS | RESPIRATION RATE: 17 BRPM | OXYGEN SATURATION: 100 %

## 2024-06-11 DIAGNOSIS — K92.0 HEMATEMESIS WITH NAUSEA: Primary | ICD-10-CM

## 2024-06-11 DIAGNOSIS — R13.10 DYSPHAGIA, UNSPECIFIED TYPE: ICD-10-CM

## 2024-06-11 PROCEDURE — 99214 OFFICE O/P EST MOD 30 MIN: CPT | Performed by: STUDENT IN AN ORGANIZED HEALTH CARE EDUCATION/TRAINING PROGRAM

## 2024-06-11 RX ORDER — PANTOPRAZOLE SODIUM 40 MG/1
40 TABLET, DELAYED RELEASE ORAL
Qty: 90 TABLET | Refills: 1 | Status: SHIPPED | OUTPATIENT
Start: 2024-06-11

## 2024-06-11 NOTE — PROGRESS NOTES
Jocelyne Borrero presents today for   Chief Complaint   Patient presents with    Emesis     Patient reports yesterday when she woke up she felt like she was going to vomit, and she went into the bathroom and her vomit was mucous streaked with blood. Only happened the one time yesterday       Is someone accompanying this pt? no    Is the patient using any DME equipment during OV? no    Health Maintenance Due   Topic Date Due    Hepatitis B vaccine (1 of 3 - 3-dose series) Never done    HIV screen  Never done    Hepatitis C screen  Never done    DTaP/Tdap/Td vaccine (1 - Tdap) Never done    COVID-19 Vaccine (3 - 2023-24 season) 09/01/2023       \"Have you been to the ER, urgent care clinic since your last visit?  Hospitalized since your last visit?\"    NO    “Have you seen or consulted any other health care providers outside of [facility] since your last visit?”    NO

## 2024-06-11 NOTE — PROGRESS NOTES
SICK VISIT     Jocelyne Borrero (: 1975) is a 48 y.o. female here for evaluation of the following chief concerns(s):  Emesis (Patient reports yesterday when she woke up she felt like she was going to vomit, and she went into the bathroom and her vomit was mucous streaked with blood. Only happened the one time yesterday)       ASSESSMENT/PLAN:  1. Hematemesis with nausea  -     External Referral To Gastroenterology  -     CBC with Auto Differential; Future  -     Iron and TIBC; Future  -     Ferritin; Future  -     Comprehensive Metabolic Panel; Future  -     pantoprazole (PROTONIX) 40 MG tablet; Take 1 tablet by mouth every morning (before breakfast), Disp-90 tablet, R-1Normal  2. Dysphagia, unspecified type  -     External Referral To Gastroenterology  -     CBC with Auto Differential; Future  -     Iron and TIBC; Future  -     Ferritin; Future  -     Comprehensive Metabolic Panel; Future  -     pantoprazole (PROTONIX) 40 MG tablet; Take 1 tablet by mouth every morning (before breakfast), Disp-90 tablet, R-1Normal    Orders Placed This Encounter   Procedures    CBC with Auto Differential     Standing Status:   Future     Standing Expiration Date:   2025    Iron and TIBC     Standing Status:   Future     Standing Expiration Date:   2025    Ferritin     Standing Status:   Future     Standing Expiration Date:   2025    Comprehensive Metabolic Panel     Standing Status:   Future     Standing Expiration Date:   2025    External Referral To Gastroenterology     Referral Priority:   Routine     Referral Type:   Eval and Treat     Referral Reason:   Specialty Services Required     Requested Specialty:   Gastroenterology     Number of Visits Requested:   1     Refer to GI. Needs EGD. Start PPI. Labs per above  ER precautios    FU as scheduled for chronic disease    Patient agrees with plan as above and has no additional questions at this time.     SUBJECTIVE/OBJECTIVE:    Patient presents for

## 2024-06-13 ENCOUNTER — HOSPITAL ENCOUNTER (OUTPATIENT)
Age: 49
Discharge: HOME | End: 2024-06-13
Payer: COMMERCIAL

## 2024-06-13 DIAGNOSIS — K92.0 HEMATEMESIS WITH NAUSEA: ICD-10-CM

## 2024-06-13 DIAGNOSIS — R13.10 DYSPHAGIA, UNSPECIFIED TYPE: ICD-10-CM

## 2024-06-13 LAB
ALBUMIN SERPL-MCNC: 3.4 G/DL (ref 3.4–5)
ALBUMIN/GLOB SERPL: 0.8 (ref 0.8–1.7)
ALP SERPL-CCNC: 36 U/L (ref 45–117)
ALT SERPL-CCNC: 17 U/L (ref 13–56)
ANION GAP SERPL CALC-SCNC: 7 MMOL/L (ref 3–18)
AST SERPL W P-5'-P-CCNC: 12 U/L (ref 10–38)
BASOPHILS # BLD: 0 K/UL (ref 0–0.1)
BASOPHILS NFR BLD: 0 % (ref 0–2)
BILIRUB SERPL-MCNC: 0.3 MG/DL (ref 0.2–1)
BUN SERPL-MCNC: 12 MG/DL (ref 7–18)
BUN/CREAT SERPL: 19 (ref 12–20)
CA-I BLD-MCNC: 9.1 MG/DL (ref 8.5–10.1)
CHLORIDE SERPL-SCNC: 108 MMOL/L (ref 100–111)
CO2 SERPL-SCNC: 26 MMOL/L (ref 21–32)
CREAT SERPL-MCNC: 0.62 MG/DL (ref 0.6–1.3)
DIFFERENTIAL METHOD BLD: ABNORMAL
EOSINOPHIL # BLD: 0 K/UL (ref 0–0.4)
EOSINOPHIL NFR BLD: 0 % (ref 0–5)
ERYTHROCYTE [DISTWIDTH] IN BLOOD BY AUTOMATED COUNT: 12.7 % (ref 11.6–14.5)
FERRITIN SERPL-MCNC: 73 NG/ML (ref 8–388)
GLOBULIN SER CALC-MCNC: 4.1 G/DL (ref 2–4)
GLUCOSE SERPL-MCNC: 158 MG/DL (ref 74–99)
HCT VFR BLD AUTO: 37.7 % (ref 35–45)
HGB BLD-MCNC: 12.3 G/DL (ref 12–16)
IMM GRANULOCYTES # BLD AUTO: 0 K/UL (ref 0–0.04)
IMM GRANULOCYTES NFR BLD AUTO: 0 % (ref 0–0.5)
IRON SATN MFR SERPL: 24 % (ref 20–50)
IRON SERPL-MCNC: 77 UG/DL (ref 50–175)
LYMPHOCYTES # BLD: 2.8 K/UL (ref 0.9–3.6)
LYMPHOCYTES NFR BLD: 35 % (ref 21–52)
MCH RBC QN AUTO: 31.1 PG (ref 24–34)
MCHC RBC AUTO-ENTMCNC: 32.6 G/DL (ref 31–37)
MCV RBC AUTO: 95.2 FL (ref 78–100)
MONOCYTES # BLD: 0.4 K/UL (ref 0.05–1.2)
MONOCYTES NFR BLD: 4 % (ref 3–10)
NEUTS SEG # BLD: 4.8 K/UL (ref 1.8–8)
NEUTS SEG NFR BLD: 61 % (ref 40–73)
NRBC # BLD: 0 K/UL (ref 0–0.01)
NRBC BLD-RTO: 0 PER 100 WBC
PLATELET # BLD AUTO: 307 K/UL (ref 135–420)
PMV BLD AUTO: 10.1 FL (ref 9.2–11.8)
POTASSIUM SERPL-SCNC: 3.9 MMOL/L (ref 3.5–5.5)
PROT SERPL-MCNC: 7.5 G/DL (ref 6.4–8.2)
RBC # BLD AUTO: 3.96 M/UL (ref 4.2–5.3)
SODIUM SERPL-SCNC: 141 MMOL/L (ref 136–145)
TIBC SERPL-MCNC: 327 UG/DL (ref 250–450)
WBC # BLD AUTO: 8.1 K/UL (ref 4.6–13.2)

## 2024-06-13 PROCEDURE — 80053 COMPREHEN METABOLIC PANEL: CPT

## 2024-06-13 PROCEDURE — 83540 ASSAY OF IRON: CPT

## 2024-06-13 PROCEDURE — 36415 COLL VENOUS BLD VENIPUNCTURE: CPT

## 2024-06-13 PROCEDURE — 85025 COMPLETE CBC W/AUTO DIFF WBC: CPT

## 2024-06-13 PROCEDURE — 82728 ASSAY OF FERRITIN: CPT

## 2024-06-14 ENCOUNTER — TELEPHONE (OUTPATIENT)
Age: 49
End: 2024-06-14

## 2024-06-14 NOTE — TELEPHONE ENCOUNTER
Completed Visits Scheduled   2 2       Procedure Information    Service Details  Procedure Modifiers Provider Requested Approved   [id number] - AMB EXTERNAL REFERRAL TO GASTROENTEROLOGY none Mario Vu MD 2 2     Diagnosis Information    Diagnosis   K92.0 (ICD-10-CM) - Hematemesis with nausea   R13.10 (ICD-10-CM) - Dysphagia, unspecified type     Service Level Authorizations    No service level authorizations were were found.  Referral Notes  Number of Notes: 2  .  Type Date User Summary Attachment   Specialty Comments 06/14/2024 10:11 AM Richard Kingston - -   Note:  Sent to provider to review.   .  Type Date User Summary Attachment   Provider Comments 06/11/2024 11:37 AM Ami Colmenares MD Provider Comments -   Note:  The patient can be scheduled with any member of the group, including the provider with the first available appointments.   Referral Order    Order   External Referral To Gastroenterology (Order # [id number]) on 06/11/2024   View Encounter        Triage Information    Decision: (none)   Priority: Routine   Schedule by date: 7/11/2024     Scheduling Instructions

## 2024-07-10 ENCOUNTER — APPOINTMENT (OUTPATIENT)
Age: 49
End: 2024-07-10
Payer: COMMERCIAL

## 2024-07-10 ENCOUNTER — HOSPITAL ENCOUNTER (EMERGENCY)
Age: 49
Discharge: HOME | End: 2024-07-10
Attending: EMERGENCY MEDICINE
Payer: COMMERCIAL

## 2024-07-10 VITALS
RESPIRATION RATE: 21 BRPM | OXYGEN SATURATION: 100 % | HEART RATE: 79 BPM | DIASTOLIC BLOOD PRESSURE: 103 MMHG | HEIGHT: 60 IN | BODY MASS INDEX: 47.29 KG/M2 | SYSTOLIC BLOOD PRESSURE: 153 MMHG | WEIGHT: 240.9 LBS | TEMPERATURE: 98.4 F

## 2024-07-10 DIAGNOSIS — K92.0 HEMATEMESIS, UNSPECIFIED WHETHER NAUSEA PRESENT: ICD-10-CM

## 2024-07-10 DIAGNOSIS — R06.00 DYSPNEA, UNSPECIFIED TYPE: Primary | ICD-10-CM

## 2024-07-10 LAB
ALBUMIN SERPL-MCNC: 3.5 G/DL (ref 3.4–5)
ALBUMIN/GLOB SERPL: 0.8 (ref 0.8–1.7)
ALP SERPL-CCNC: 30 U/L (ref 45–117)
ALT SERPL-CCNC: 32 U/L (ref 13–56)
ANION GAP SERPL CALC-SCNC: 9 MMOL/L (ref 3–18)
APPEARANCE UR: NORMAL
AST SERPL W P-5'-P-CCNC: 20 U/L (ref 10–38)
BASOPHILS # BLD: 0 K/UL (ref 0–0.1)
BASOPHILS NFR BLD: 1 % (ref 0–2)
BILIRUB SERPL-MCNC: 0.3 MG/DL (ref 0.2–1)
BILIRUB UR QL: NEGATIVE
BUN SERPL-MCNC: 8 MG/DL (ref 7–18)
BUN/CREAT SERPL: 14 (ref 12–20)
CA-I BLD-MCNC: 9 MG/DL (ref 8.5–10.1)
CHLORIDE SERPL-SCNC: 104 MMOL/L (ref 100–111)
CO2 SERPL-SCNC: 26 MMOL/L (ref 21–32)
COLOR UR: YELLOW
CREAT SERPL-MCNC: 0.56 MG/DL (ref 0.6–1.3)
DIFFERENTIAL METHOD BLD: ABNORMAL
EKG ATRIAL RATE: 70 BPM
EKG DIAGNOSIS: NORMAL
EKG P AXIS: 60 DEGREES
EKG P-R INTERVAL: 154 MS
EKG Q-T INTERVAL: 420 MS
EKG QRS DURATION: 92 MS
EKG QTC CALCULATION (BAZETT): 453 MS
EKG R AXIS: -7 DEGREES
EKG T AXIS: 19 DEGREES
EKG VENTRICULAR RATE: 70 BPM
EOSINOPHIL # BLD: 0.1 K/UL (ref 0–0.4)
EOSINOPHIL NFR BLD: 1 % (ref 0–5)
ERYTHROCYTE [DISTWIDTH] IN BLOOD BY AUTOMATED COUNT: 12.8 % (ref 11.6–14.5)
GLOBULIN SER CALC-MCNC: 4.3 G/DL (ref 2–4)
GLUCOSE SERPL-MCNC: 85 MG/DL (ref 74–99)
GLUCOSE UR STRIP.AUTO-MCNC: NEGATIVE MG/DL
HCT VFR BLD AUTO: 37.5 % (ref 35–45)
HGB BLD-MCNC: 12.6 G/DL (ref 12–16)
HGB UR QL STRIP: NEGATIVE
IMM GRANULOCYTES # BLD AUTO: 0 K/UL (ref 0–0.04)
IMM GRANULOCYTES NFR BLD AUTO: 0 % (ref 0–0.5)
KETONES UR QL STRIP.AUTO: NEGATIVE MG/DL
LEUKOCYTE ESTERASE UR QL STRIP.AUTO: NEGATIVE
LIPASE SERPL-CCNC: 23 U/L (ref 13–75)
LYMPHOCYTES # BLD: 2.6 K/UL (ref 0.9–3.6)
LYMPHOCYTES NFR BLD: 35 % (ref 21–52)
MAGNESIUM SERPL-MCNC: 1.7 MG/DL (ref 1.6–2.6)
MCH RBC QN AUTO: 31.7 PG (ref 24–34)
MCHC RBC AUTO-ENTMCNC: 33.6 G/DL (ref 31–37)
MCV RBC AUTO: 94.5 FL (ref 78–100)
MONOCYTES # BLD: 0.6 K/UL (ref 0.05–1.2)
MONOCYTES NFR BLD: 8 % (ref 3–10)
NEUTS SEG # BLD: 4.2 K/UL (ref 1.8–8)
NEUTS SEG NFR BLD: 55 % (ref 40–73)
NITRITE UR QL STRIP.AUTO: NEGATIVE
NRBC # BLD: 0 K/UL (ref 0–0.01)
NRBC BLD-RTO: 0 PER 100 WBC
PH UR STRIP: 6.5 (ref 5–8)
PLATELET # BLD AUTO: 324 K/UL (ref 135–420)
PMV BLD AUTO: 9.5 FL (ref 9.2–11.8)
POTASSIUM SERPL-SCNC: 3.6 MMOL/L (ref 3.5–5.5)
PROT SERPL-MCNC: 7.8 G/DL (ref 6.4–8.2)
PROT UR STRIP-MCNC: NEGATIVE MG/DL
RBC # BLD AUTO: 3.97 M/UL (ref 4.2–5.3)
SODIUM SERPL-SCNC: 139 MMOL/L (ref 136–145)
SP GR UR REFRACTOMETRY: 1.02 (ref 1–1.03)
TROPONIN I SERPL HS-MCNC: <3 NG/L (ref 0–54)
UROBILINOGEN UR QL STRIP.AUTO: 1 EU/DL (ref 0.2–1)
WBC # BLD AUTO: 7.5 K/UL (ref 4.6–13.2)

## 2024-07-10 PROCEDURE — 93005 ELECTROCARDIOGRAM TRACING: CPT | Performed by: EMERGENCY MEDICINE

## 2024-07-10 PROCEDURE — 83735 ASSAY OF MAGNESIUM: CPT

## 2024-07-10 PROCEDURE — 71045 X-RAY EXAM CHEST 1 VIEW: CPT

## 2024-07-10 PROCEDURE — 96375 TX/PRO/DX INJ NEW DRUG ADDON: CPT

## 2024-07-10 PROCEDURE — 71275 CT ANGIOGRAPHY CHEST: CPT

## 2024-07-10 PROCEDURE — 80053 COMPREHEN METABOLIC PANEL: CPT

## 2024-07-10 PROCEDURE — 96374 THER/PROPH/DIAG INJ IV PUSH: CPT

## 2024-07-10 PROCEDURE — 6360000002 HC RX W HCPCS: Performed by: EMERGENCY MEDICINE

## 2024-07-10 PROCEDURE — 81003 URINALYSIS AUTO W/O SCOPE: CPT

## 2024-07-10 PROCEDURE — 6360000004 HC RX CONTRAST MEDICATION: Performed by: EMERGENCY MEDICINE

## 2024-07-10 PROCEDURE — 84484 ASSAY OF TROPONIN QUANT: CPT

## 2024-07-10 PROCEDURE — 99285 EMERGENCY DEPT VISIT HI MDM: CPT

## 2024-07-10 PROCEDURE — 83690 ASSAY OF LIPASE: CPT

## 2024-07-10 PROCEDURE — 85025 COMPLETE CBC W/AUTO DIFF WBC: CPT

## 2024-07-10 RX ORDER — ONDANSETRON 4 MG/1
4 TABLET, ORALLY DISINTEGRATING ORAL 3 TIMES DAILY PRN
Qty: 10 TABLET | Refills: 0 | Status: SHIPPED | OUTPATIENT
Start: 2024-07-10

## 2024-07-10 RX ORDER — OMEPRAZOLE 40 MG/1
40 CAPSULE, DELAYED RELEASE ORAL
Qty: 20 CAPSULE | Refills: 5 | Status: SHIPPED | OUTPATIENT
Start: 2024-07-10

## 2024-07-10 RX ORDER — ONDANSETRON 2 MG/ML
4 INJECTION INTRAMUSCULAR; INTRAVENOUS
Status: COMPLETED | OUTPATIENT
Start: 2024-07-10 | End: 2024-07-10

## 2024-07-10 RX ORDER — PANTOPRAZOLE SODIUM 40 MG/10ML
40 INJECTION, POWDER, LYOPHILIZED, FOR SOLUTION INTRAVENOUS ONCE
Status: COMPLETED | OUTPATIENT
Start: 2024-07-10 | End: 2024-07-10

## 2024-07-10 RX ADMIN — ONDANSETRON 4 MG: 2 INJECTION INTRAMUSCULAR; INTRAVENOUS at 09:27

## 2024-07-10 RX ADMIN — PANTOPRAZOLE SODIUM 40 MG: 40 INJECTION, POWDER, FOR SOLUTION INTRAVENOUS at 09:28

## 2024-07-10 RX ADMIN — IOPAMIDOL 96 ML: 755 INJECTION, SOLUTION INTRAVENOUS at 09:55

## 2024-07-10 ASSESSMENT — PAIN - FUNCTIONAL ASSESSMENT: PAIN_FUNCTIONAL_ASSESSMENT: 0-10

## 2024-07-10 ASSESSMENT — LIFESTYLE VARIABLES
HOW MANY STANDARD DRINKS CONTAINING ALCOHOL DO YOU HAVE ON A TYPICAL DAY: 1 OR 2
HOW OFTEN DO YOU HAVE A DRINK CONTAINING ALCOHOL: 2-4 TIMES A MONTH

## 2024-07-10 ASSESSMENT — PAIN SCALES - GENERAL
PAINLEVEL_OUTOF10: 0
PAINLEVEL_OUTOF10: 0

## 2024-07-10 NOTE — ED TRIAGE NOTES
Patient presents with shortness of breath that began this morning.  She states vomiting x two and noted emesis tinged with blood.  She states that she has been referred to GI and is awaiting follow up.  She states intermittent vomiting x one month.  Denies diarrhea

## 2024-07-11 ENCOUNTER — PREP FOR PROCEDURE (OUTPATIENT)
Age: 49
End: 2024-07-11

## 2024-07-11 ENCOUNTER — TELEPHONE (OUTPATIENT)
Age: 49
End: 2024-07-11

## 2024-07-11 ENCOUNTER — SCHEDULED TELEPHONE ENCOUNTER (OUTPATIENT)
Age: 49
End: 2024-07-11

## 2024-07-11 DIAGNOSIS — R13.10 DYSPHAGIA, UNSPECIFIED TYPE: ICD-10-CM

## 2024-07-11 DIAGNOSIS — K92.0 HEMATEMESIS WITH NAUSEA: Primary | ICD-10-CM

## 2024-07-11 DIAGNOSIS — R13.19 OTHER DYSPHAGIA: ICD-10-CM

## 2024-07-11 NOTE — TELEPHONE ENCOUNTER
Spoke to pt states back in June saw pcp for vomitus of mucous with some streaks of blood.  Started on protonix and referred to GI which she has not seen yet.  Went to er yesterday with shortness of breath and complaints vomitus with some blood.  Has had total of maybe four episodes along with feeling of foods getting stuck.  Er referred her back here for follow-up.  She is willing to go to [facility] to be seen or we can schedule her here next week

## 2024-07-11 NOTE — TELEPHONE ENCOUNTER
Spoke to pt and she will call Dr. Vu office today as she was referred to them in June and also followup with us for her routine bariatric care once she has her gi issues addressed

## 2024-07-11 NOTE — TELEPHONE ENCOUNTER
Pt called to say she has appt with Dr. Vu next Tuesday.  She will call back once she has her GI symptoms addressed.

## 2024-07-12 ENCOUNTER — ANESTHESIA EVENT (OUTPATIENT)
Age: 49
End: 2024-07-12
Payer: COMMERCIAL

## 2024-07-12 RX ORDER — ALBUTEROL SULFATE 2.5 MG/3ML
2.5 SOLUTION RESPIRATORY (INHALATION) AS NEEDED
Status: CANCELLED | OUTPATIENT
Start: 2024-07-12

## 2024-07-12 RX ORDER — SODIUM CHLORIDE 0.9 % (FLUSH) 0.9 %
5-40 SYRINGE (ML) INJECTION EVERY 12 HOURS SCHEDULED
Status: CANCELLED | OUTPATIENT
Start: 2024-07-12

## 2024-07-12 RX ORDER — SODIUM CHLORIDE 9 MG/ML
INJECTION, SOLUTION INTRAVENOUS PRN
Status: CANCELLED | OUTPATIENT
Start: 2024-07-12

## 2024-07-15 ENCOUNTER — TELEPHONE (OUTPATIENT)
Age: 49
End: 2024-07-15

## 2024-07-15 RX ORDER — SODIUM CHLORIDE, SODIUM LACTATE, POTASSIUM CHLORIDE, CALCIUM CHLORIDE 600; 310; 30; 20 MG/100ML; MG/100ML; MG/100ML; MG/100ML
INJECTION, SOLUTION INTRAVENOUS CONTINUOUS
Status: CANCELLED | OUTPATIENT
Start: 2024-07-15

## 2024-07-15 NOTE — TELEPHONE ENCOUNTER
----- Message from Mario Vu MD sent at 7/14/2024  5:18 PM EDT -----  Patient referred from emergency room for hematemesis.  Previously referred for the same problem and she is scheduled for EGD to evaluate this problem 7/16/2024.  Continue with that plan.  ----- Message -----  From: Mulu Green MD  Sent: 7/10/2024  10:42 PM EDT  To: Mario Vu MD

## 2024-07-16 ENCOUNTER — HOSPITAL ENCOUNTER (OUTPATIENT)
Age: 49
Setting detail: OUTPATIENT SURGERY
Discharge: HOME | End: 2024-07-16
Attending: INTERNAL MEDICINE | Admitting: INTERNAL MEDICINE
Payer: COMMERCIAL

## 2024-07-16 ENCOUNTER — ANESTHESIA (OUTPATIENT)
Age: 49
End: 2024-07-16
Payer: COMMERCIAL

## 2024-07-16 VITALS
HEIGHT: 60 IN | HEART RATE: 67 BPM | TEMPERATURE: 97 F | BODY MASS INDEX: 44.96 KG/M2 | WEIGHT: 229 LBS | DIASTOLIC BLOOD PRESSURE: 100 MMHG | OXYGEN SATURATION: 100 % | SYSTOLIC BLOOD PRESSURE: 153 MMHG | RESPIRATION RATE: 16 BRPM

## 2024-07-16 DIAGNOSIS — K92.0 HEMATEMESIS WITH NAUSEA: ICD-10-CM

## 2024-07-16 DIAGNOSIS — R13.19 OTHER DYSPHAGIA: ICD-10-CM

## 2024-07-16 PROBLEM — R13.10 DYSPHAGIA: Status: RESOLVED | Noted: 2024-07-11 | Resolved: 2024-07-16

## 2024-07-16 PROCEDURE — 7100000010 HC PHASE II RECOVERY - FIRST 15 MIN: Performed by: INTERNAL MEDICINE

## 2024-07-16 PROCEDURE — 3700000001 HC ADD 15 MINUTES (ANESTHESIA): Performed by: INTERNAL MEDICINE

## 2024-07-16 PROCEDURE — 3600007501: Performed by: INTERNAL MEDICINE

## 2024-07-16 PROCEDURE — 3600007511: Performed by: INTERNAL MEDICINE

## 2024-07-16 PROCEDURE — 2709999900 HC NON-CHARGEABLE SUPPLY: Performed by: INTERNAL MEDICINE

## 2024-07-16 PROCEDURE — 2580000003 HC RX 258: Performed by: INTERNAL MEDICINE

## 2024-07-16 PROCEDURE — 43249 ESOPH EGD DILATION <30 MM: CPT | Performed by: INTERNAL MEDICINE

## 2024-07-16 PROCEDURE — C1726 CATH, BAL DIL, NON-VASCULAR: HCPCS | Performed by: INTERNAL MEDICINE

## 2024-07-16 PROCEDURE — C1713 ANCHOR/SCREW BN/BN,TIS/BN: HCPCS | Performed by: INTERNAL MEDICINE

## 2024-07-16 PROCEDURE — 2500000003 HC RX 250 WO HCPCS: Performed by: NURSE ANESTHETIST, CERTIFIED REGISTERED

## 2024-07-16 PROCEDURE — 3700000000 HC ANESTHESIA ATTENDED CARE: Performed by: INTERNAL MEDICINE

## 2024-07-16 PROCEDURE — 7100000011 HC PHASE II RECOVERY - ADDTL 15 MIN: Performed by: INTERNAL MEDICINE

## 2024-07-16 PROCEDURE — 6360000002 HC RX W HCPCS: Performed by: NURSE ANESTHETIST, CERTIFIED REGISTERED

## 2024-07-16 RX ORDER — PROPOFOL 10 MG/ML
INJECTION, EMULSION INTRAVENOUS CONTINUOUS PRN
Status: DISCONTINUED | OUTPATIENT
Start: 2024-07-16 | End: 2024-07-16 | Stop reason: SDUPTHER

## 2024-07-16 RX ORDER — SODIUM CHLORIDE 0.9 % (FLUSH) 0.9 %
5-40 SYRINGE (ML) INJECTION EVERY 12 HOURS SCHEDULED
Status: CANCELLED | OUTPATIENT
Start: 2024-07-16

## 2024-07-16 RX ORDER — SODIUM CHLORIDE, SODIUM LACTATE, POTASSIUM CHLORIDE, CALCIUM CHLORIDE 600; 310; 30; 20 MG/100ML; MG/100ML; MG/100ML; MG/100ML
INJECTION, SOLUTION INTRAVENOUS CONTINUOUS
Status: DISCONTINUED | OUTPATIENT
Start: 2024-07-16 | End: 2024-07-16 | Stop reason: HOSPADM

## 2024-07-16 RX ORDER — DIPHENHYDRAMINE HYDROCHLORIDE 50 MG/ML
12.5 INJECTION INTRAMUSCULAR; INTRAVENOUS
Status: CANCELLED | OUTPATIENT
Start: 2024-07-16 | End: 2024-07-17

## 2024-07-16 RX ORDER — SODIUM CHLORIDE 0.9 % (FLUSH) 0.9 %
5-40 SYRINGE (ML) INJECTION PRN
Status: DISCONTINUED | OUTPATIENT
Start: 2024-07-16 | End: 2024-07-16 | Stop reason: HOSPADM

## 2024-07-16 RX ORDER — FENTANYL CITRATE 50 UG/ML
25 INJECTION, SOLUTION INTRAMUSCULAR; INTRAVENOUS EVERY 5 MIN PRN
Status: CANCELLED | OUTPATIENT
Start: 2024-07-16

## 2024-07-16 RX ORDER — ONDANSETRON 2 MG/ML
4 INJECTION INTRAMUSCULAR; INTRAVENOUS
Status: CANCELLED | OUTPATIENT
Start: 2024-07-16 | End: 2024-07-17

## 2024-07-16 RX ORDER — NALOXONE HYDROCHLORIDE 0.4 MG/ML
INJECTION, SOLUTION INTRAMUSCULAR; INTRAVENOUS; SUBCUTANEOUS PRN
Status: CANCELLED | OUTPATIENT
Start: 2024-07-16

## 2024-07-16 RX ORDER — SODIUM CHLORIDE 9 MG/ML
INJECTION, SOLUTION INTRAVENOUS PRN
Status: CANCELLED | OUTPATIENT
Start: 2024-07-16

## 2024-07-16 RX ORDER — LIDOCAINE HYDROCHLORIDE 20 MG/ML
INJECTION, SOLUTION INFILTRATION; PERINEURAL PRN
Status: DISCONTINUED | OUTPATIENT
Start: 2024-07-16 | End: 2024-07-16 | Stop reason: SDUPTHER

## 2024-07-16 RX ORDER — SODIUM CHLORIDE 0.9 % (FLUSH) 0.9 %
5-40 SYRINGE (ML) INJECTION PRN
Status: CANCELLED | OUTPATIENT
Start: 2024-07-16

## 2024-07-16 RX ADMIN — PROPOFOL 180 MCG/KG/MIN: 10 INJECTION, EMULSION INTRAVENOUS at 14:23

## 2024-07-16 RX ADMIN — LIDOCAINE HYDROCHLORIDE 20 MG: 20 INJECTION, SOLUTION INFILTRATION; PERINEURAL at 14:23

## 2024-07-16 RX ADMIN — SODIUM CHLORIDE, POTASSIUM CHLORIDE, SODIUM LACTATE AND CALCIUM CHLORIDE: 600; 310; 30; 20 INJECTION, SOLUTION INTRAVENOUS at 13:43

## 2024-07-16 ASSESSMENT — PAIN - FUNCTIONAL ASSESSMENT
PAIN_FUNCTIONAL_ASSESSMENT: NONE - DENIES PAIN

## 2024-07-16 NOTE — ANESTHESIA POSTPROCEDURE EVALUATION
Department of Anesthesiology  Postprocedure Note    Patient: Jocelyne Borrero  MRN: [id number]  YOB: 1975  Date of evaluation: 7/16/2024    Procedure Summary       Date: 07/16/24 Room / Location: [facility] ENDO 01 / [facility] ENDOSCOPY    Anesthesia Start: 1422 Anesthesia Stop: 1449    Procedure: EGD with dilation (Upper GI Region) Diagnosis:       Hematemesis with nausea      Dysphagia      (Hematemesis with nausea [K92.0])      (Dysphagia [R13.10])    Surgeons: Mario Vu MD Responsible Provider: Minor Hoang APRN - CRNA    Anesthesia Type: MAC ASA Status: 2            Anesthesia Type: MAC    Case Phase I: Case Score: 10    Case Phase II:      Anesthesia Post Evaluation    Patient location during evaluation: bedside  Patient participation: complete - patient participated  Level of consciousness: awake  Pain score: 0  Airway patency: patent  Nausea & Vomiting: no vomiting and no nausea  Cardiovascular status: blood pressure returned to baseline  Respiratory status: acceptable  Hydration status: stable  Pain management: adequate    No notable events documented.

## 2024-07-16 NOTE — H&P
Open access updated H&P.      Brief history: The patient is female Black /  48 y.o. who was referred for hematemesis and dysphagia to be evaluated by EGD.  Review of the records showed that they had few if any health problems, excessive obesity, or significant medications that would require office evaluation prior to EGD for hematemesis and dysphagia.  After review of their chart, contact was made with the patient in discussion and literature sent regarding the procedure and the bowel preparation.  They are here now for their procedure.        Past medical and surgical history:   Past Medical History:   Diagnosis Date    Anxiety and depression     Back pain     Constipation     Diverticulitis     Fibroids     GERD (gastroesophageal reflux disease)     HTN (hypertension)     no meds    Knee pain     Menopause     Morbid obesity (HCC)     OA (osteoarthritis)     of knees      Past Surgical History:   Procedure Laterality Date    BREAST REDUCTION SURGERY  2016?    BREAST SURGERY      reduction    COLONOSCOPY      GASTRIC BYPASS SURGERY      GASTRIC BYPASS SURGERY  11/30/2020    GYN      btl and partial hysterectomy    HYSTERECTOMY (CERVIX STATUS UNKNOWN)  Sept?  2020    KAMILAH done at [facility] w/ Dr Wheatley    HYSTERECTOMY, VAGINAL  09/2020    OVARY REMOVAL          Allergies:    Allergies   Allergen Reactions    Lisinopril Other (See Comments) and Swelling     Swollen lips  Other reaction(s): Not available  Made my face swell          Medications:  No current facility-administered medications for this encounter.    Current Outpatient Medications:     ondansetron (ZOFRAN-ODT) 4 MG disintegrating tablet, Take 1 tablet by mouth 3 times daily as needed for Nausea or Vomiting, Disp: 10 tablet, Rfl: 0    omeprazole (PRILOSEC) 40 MG delayed release capsule, Take 1 capsule by mouth every morning (before breakfast), Disp: 20 capsule, Rfl: 5    pantoprazole (PROTONIX) 40 MG tablet, Take 1 tablet by mouth every

## 2024-07-16 NOTE — ED PROVIDER NOTES
[facility] EMERGENCY DEPT  EMERGENCY DEPARTMENT ENCOUNTER       Pt Name: Jocelyne Borrero  MRN: [id number]  Birthdate 1975  Date of evaluation: 7/10/2024  Provider: Mulu Green MD   PCP: Ami Colmenares MD  Note Started: 6:10 PM 7/16/24     CHIEF COMPLAINT       Chief Complaint   Patient presents with    Shortness of Breath    Vomiting Blood        HISTORY OF PRESENT ILLNESS: 1 or more elements      History From: Patient  History limited by: Nothing     Jocelyne Borrero is a 48 y.o. female who presents to ED complaining of shortness of breath since this morning.  She also reports that she vomited twice and noticed small change blood on vomitus.  She has had intermittent vomiting since about a month ago, also reports has been referred to GI but has not been able to be seen.  Shortness of breath began prior to vomiting.  She has no chest pain, sore throat, fever or chills.  She does report history of gastric bypass several years ago.  She has not seen her bariatric surgeon for several years.  Patient does indicate history of anxiety.     Nursing Notes were all reviewed and agreed with or any disagreements were addressed in the HPI.     REVIEW OF SYSTEMS      Review of Systems     Positives and Pertinent negatives as per HPI.    PAST HISTORY     Past Medical History:  Past Medical History:   Diagnosis Date    Anxiety and depression     Back pain     Constipation     Diverticulitis     Fibroids     GERD (gastroesophageal reflux disease)     HTN (hypertension)     no meds    Knee pain     Menopause     Morbid obesity (HCC)     OA (osteoarthritis)     of knees       Past Surgical History:  Past Surgical History:   Procedure Laterality Date    BREAST REDUCTION SURGERY  2016?    BREAST SURGERY      reduction    COLONOSCOPY      GASTRIC BYPASS SURGERY      GASTRIC BYPASS SURGERY  11/30/2020    GYN      btl and partial hysterectomy    HYSTERECTOMY (CERVIX STATUS UNKNOWN)  Sept?  2020    KAMILAH done at [facility] w/

## 2024-07-16 NOTE — ANESTHESIA PRE PROCEDURE
Department of Anesthesiology  Preprocedure Note       Name:  Jocelyne Borrero   Age:  48 y.o.  :  1975                                          MRN:  [id number]         Date:  2024      Surgeon: Surgeon(s):  Mario Vu MD    Procedure: Procedure(s):  EGD    Medications prior to admission:   Prior to Admission medications    Medication Sig Start Date End Date Taking? Authorizing Provider   ondansetron (ZOFRAN-ODT) 4 MG disintegrating tablet Take 1 tablet by mouth 3 times daily as needed for Nausea or Vomiting 7/10/24   Mulu Green MD   omeprazole (PRILOSEC) 40 MG delayed release capsule Take 1 capsule by mouth every morning (before breakfast) 7/10/24   Mulu Green MD   NONFORMULARY Calcium citrate 500 milligram 3 times a day    ProviderMan MD   Multiple Vitamins-Minerals (BARIATRIC MULTIVITAMINS/IRON PO) Take by mouth daily [facility]    Provider, MD Man       Current medications:    Current Facility-Administered Medications   Medication Dose Route Frequency Provider Last Rate Last Admin   • lactated ringers IV soln infusion   IntraVENous Continuous Irwin Real APRN - CRNA       • sodium chloride flush 0.9 % injection 5-40 mL  5-40 mL IntraVENous PRN Irwin Real APRN - CRNA       • lactated ringers IV soln infusion   IntraVENous Continuous Mario Vu  mL/hr at 24 1343 New Bag at 24 1343       Allergies:    Allergies   Allergen Reactions   • Lisinopril Other (See Comments) and Swelling     Swollen lips  Other reaction(s): Not available  Made my face swell         Problem List:    Patient Active Problem List   Diagnosis Code   • Obese E66.9   • Bariatric surgery status Z98.84   • Status post laparoscopic hysterectomy Z90.710   • Hematemesis with nausea K92.0   • Dysphagia R13.10       Past Medical History:        Diagnosis Date   • Anxiety and depression    • Back pain    • Constipation    • Diverticulitis

## 2024-07-16 NOTE — OP NOTE
gastric body, antrum, and pylorus were normal, including normal gastric mucosa with no masses, ulcers, or mucosal abnormalities.  However the patient is status post gastric bypass within normal efferent limb and gastric pouch present.  No ulcerations or inflammation were seen.  Biopsies were obtained in the gastric body and antrum to exclude H. Pylori.   Retroflexion view of the cardia and fundus were normal.     DUODENUM:  The duodenal bulb and descending duodenum were then evaluated and found to be normal including no masses, ulcers, or mucosal abnormalities.      SPECIMENS: None    ESTIMATED BLOOD LOSS:  None.    COMPLICATIONS:  None     COMMENTS: none    Impression:  1.  Send Schatzki's ring status post balloon dilatation with a 60 French (20 mm) balloon dilator.  2.  Status post gastric bypass with normal mucosa and anatomy endoscopically.  3.  No etiology to explain hematemesis.      Recommendations:    1.  Continue present therapy.    2.  Repeat balloon dilatation as needed 3.  Further recommendations pending clinical course as needed.  3.  Follow up as needed.       Mario Vu MD

## 2024-07-16 NOTE — DISCHARGE INSTRUCTIONS
Impression:  1.  Send Schatzki's ring status post balloon dilatation with a 60 French (20 mm) balloon dilator.  2.  Status post gastric bypass with normal mucosa and anatomy endoscopically.  3.  No etiology to explain hematemesis.        Recommendations:    1.  Continue present therapy.    2.  Repeat balloon dilatation as needed 3.  Further recommendations pending clinical course as needed.  3.  Follow up as needed.

## 2024-07-16 NOTE — INTERVAL H&P NOTE
Update History & Physical    The patient's History and Physical of July 16, 2024 was reviewed with the patient and I examined the patient. There was no change. The surgical site was confirmed by the patient and me.     Plan: The risks, benefits, expected outcome, and alternative to the recommended procedure have been discussed with the patient. Patient understands and wants to proceed with the procedure.     Electronically signed by Mario Vu MD on 7/16/2024 at 1:56 PM

## 2024-08-01 ENCOUNTER — OFFICE VISIT (OUTPATIENT)
Age: 49
End: 2024-08-01
Payer: COMMERCIAL

## 2024-08-01 VITALS
WEIGHT: 237 LBS | BODY MASS INDEX: 46.53 KG/M2 | TEMPERATURE: 98 F | SYSTOLIC BLOOD PRESSURE: 142 MMHG | HEIGHT: 60 IN | RESPIRATION RATE: 18 BRPM | DIASTOLIC BLOOD PRESSURE: 87 MMHG | OXYGEN SATURATION: 99 % | HEART RATE: 73 BPM

## 2024-08-01 DIAGNOSIS — E66.01 MORBID OBESITY (HCC): ICD-10-CM

## 2024-08-01 DIAGNOSIS — K91.2 POSTOPERATIVE INTESTINAL MALABSORPTION: Primary | ICD-10-CM

## 2024-08-01 DIAGNOSIS — E55.9 VITAMIN D DEFICIENCY: ICD-10-CM

## 2024-08-01 DIAGNOSIS — Z98.84 S/P GASTRIC BYPASS: ICD-10-CM

## 2024-08-01 DIAGNOSIS — Z71.3 ENCOUNTER FOR WEIGHT LOSS COUNSELING: ICD-10-CM

## 2024-08-01 PROCEDURE — 99213 OFFICE O/P EST LOW 20 MIN: CPT | Performed by: NURSE PRACTITIONER

## 2024-08-01 RX ORDER — SEMAGLUTIDE 0.25 MG/.5ML
0.25 INJECTION, SOLUTION SUBCUTANEOUS
Qty: 2 ML | Refills: 0 | Status: SHIPPED | OUTPATIENT
Start: 2024-08-01

## 2024-08-01 RX ORDER — SEMAGLUTIDE 0.5 MG/.5ML
0.5 INJECTION, SOLUTION SUBCUTANEOUS
Qty: 2 ML | Refills: 0 | Status: SHIPPED | OUTPATIENT
Start: 2024-08-01

## 2024-08-01 RX ORDER — CHOLECALCIFEROL (VITAMIN D3) 1250 MCG
50000 CAPSULE ORAL
Qty: 12 CAPSULE | Refills: 1 | Status: SHIPPED | OUTPATIENT
Start: 2024-08-01

## 2024-08-01 ASSESSMENT — ENCOUNTER SYMPTOMS
EYES NEGATIVE: 1
ABDOMINAL DISTENTION: 0
COUGH: 0
ABDOMINAL PAIN: 0
SHORTNESS OF BREATH: 0
VOMITING: 1
NAUSEA: 1
CONSTIPATION: 0
WHEEZING: 0
DIARRHEA: 0
BLOOD IN STOOL: 0

## 2024-08-01 NOTE — PROGRESS NOTES
Bariatric Postoperative Nurse Note      Jocelyne Borrero is a 48 y.o. female status post laparoscopic gastric bypass surgery performed on 09/23/2020.    All Post-Ops (including two weeks)  -# of grams of protein daily? 50-60g  -sources of protein? Chicken, Eggs, Fish, Turkey  -# of oz of sugar free fluids from all sources daily?  64oz daily.  -Nausea? No  -Vomiting? No  -Difficulty swallow/food sticking? Yes.  -Heartburn/regurgitation? No  -Character of bowel movements (diarrhea/constipation/bloody stools?) regular  -Which multivitamin product are you taking? Procare   -What dose and how frequently are you taking calcium citrate? 500mg chews twice daily.  - from any iron-containing multivitamin by 2 hours? Yes  -Ulcer risk exposures:   NSAID No  Tobacco No  Alcohol No  Steroids No  -Minutes of physical activity and what type? Walking + 10k steps working at [employer].        
medical nutrition therapy as needed.     2. Weight management.  Will refer to [facility] to assist with mental aspects of weight regain and eating past the point of being full. She is encouraged to maintain an active lifestyle and adhere to a balanced diet. She desires to try Wegovy again as she was only able to take the medication for a couple of months last year before the national backorder. The first two doses of Wegovy have been prescribed.  Orlistat: contraindicated as she is s/p bariatric surgery with a history of vitamin D deficiency  Phentermine (Adipex-P): took off and on between 9/2022-3/2023 with minimal weight loss and also experienced constipation.   Phentermine-topiramate (Qysmia): see phentermine and took generic topiramate for 2 months in 2023 with no weight loss.   Bupropion-naltrexone (Contrave): took for 1 week in 2023 and was discontinued due to headache and elevated blood pressure.   Diet & Exercise Plan:   High protein/low carb bariatric diet, <1500 kcal daily, focusing on smaller portion sizes and limiting snacking.   Tracking food intake via paper and pen or yuri like [employer].   Aim for at least 150 min exercise (walking/cardio) weekly.     3. Vitamin D deficiency.  She will be prescribed a high-dose vitamin D capsule to be taken once a week.    Follow-up  The patient will follow up in 2 months.    The primary encounter diagnosis was Postoperative intestinal malabsorption. Diagnoses of S/P gastric bypass, Morbid obesity (HCC), BMI 45.0-49.9, adult (HCC), Encounter for weight loss counseling, and Vitamin D deficiency were also pertinent to this visit.     ROSSY Macias NP    The patient (or guardian, if applicable) and other individuals in attendance with the patient were advised that Artificial Intelligence will be utilized during this visit to record and process the conversation to generate a clinical note. The patient (or guardian, if applicable) and

## 2024-08-26 DIAGNOSIS — E66.01 MORBID OBESITY: ICD-10-CM

## 2024-08-29 DIAGNOSIS — E66.01 MORBID OBESITY: ICD-10-CM

## 2024-08-30 RX ORDER — SEMAGLUTIDE 0.25 MG/.5ML
0.25 INJECTION, SOLUTION SUBCUTANEOUS
Qty: 2 ML | Refills: 0 | OUTPATIENT
Start: 2024-08-30

## 2024-09-02 ENCOUNTER — HOSPITAL ENCOUNTER (EMERGENCY)
Age: 49
Discharge: HOME | End: 2024-09-02
Attending: FAMILY MEDICINE
Payer: COMMERCIAL

## 2024-09-02 VITALS
OXYGEN SATURATION: 100 % | DIASTOLIC BLOOD PRESSURE: 92 MMHG | TEMPERATURE: 98.5 F | RESPIRATION RATE: 18 BRPM | HEART RATE: 82 BPM | HEIGHT: 60 IN | WEIGHT: 230 LBS | BODY MASS INDEX: 45.16 KG/M2 | SYSTOLIC BLOOD PRESSURE: 134 MMHG

## 2024-09-02 DIAGNOSIS — E86.0 DEHYDRATION: ICD-10-CM

## 2024-09-02 DIAGNOSIS — R03.0 ELEVATED BLOOD PRESSURE READING WITHOUT DIAGNOSIS OF HYPERTENSION: Primary | ICD-10-CM

## 2024-09-02 LAB
ALBUMIN SERPL-MCNC: 3.9 G/DL (ref 3.4–5)
ALBUMIN/GLOB SERPL: 0.9 (ref 0.8–1.7)
ALP SERPL-CCNC: 41 U/L (ref 45–117)
ALT SERPL-CCNC: 19 U/L (ref 13–56)
ANION GAP SERPL CALC-SCNC: 7 MMOL/L (ref 3–18)
APPEARANCE UR: ABNORMAL
AST SERPL W P-5'-P-CCNC: 20 U/L (ref 10–38)
BACTERIA URNS QL MICRO: ABNORMAL /HPF
BASOPHILS # BLD: 0.1 K/UL (ref 0–0.1)
BASOPHILS NFR BLD: 1 % (ref 0–2)
BILIRUB SERPL-MCNC: 0.4 MG/DL (ref 0.2–1)
BILIRUB UR QL: NEGATIVE
BUN SERPL-MCNC: 10 MG/DL (ref 7–18)
BUN/CREAT SERPL: 16 (ref 12–20)
CA-I BLD-MCNC: 9.2 MG/DL (ref 8.5–10.1)
CHLORIDE SERPL-SCNC: 105 MMOL/L (ref 100–111)
CO2 SERPL-SCNC: 27 MMOL/L (ref 21–32)
COLOR UR: YELLOW
CREAT SERPL-MCNC: 0.63 MG/DL (ref 0.6–1.3)
DIFFERENTIAL METHOD BLD: ABNORMAL
EKG ATRIAL RATE: 77 BPM
EKG DIAGNOSIS: NORMAL
EKG P AXIS: 29 DEGREES
EKG P-R INTERVAL: 172 MS
EKG Q-T INTERVAL: 400 MS
EKG QRS DURATION: 86 MS
EKG QTC CALCULATION (BAZETT): 452 MS
EKG R AXIS: -13 DEGREES
EKG T AXIS: 12 DEGREES
EKG VENTRICULAR RATE: 77 BPM
EOSINOPHIL # BLD: 0.1 K/UL (ref 0–0.4)
EOSINOPHIL NFR BLD: 1 % (ref 0–5)
EPITH CASTS URNS QL MICRO: ABNORMAL /LPF (ref 0–20)
ERYTHROCYTE [DISTWIDTH] IN BLOOD BY AUTOMATED COUNT: 11.9 % (ref 11.6–14.5)
GLOBULIN SER CALC-MCNC: 4.3 G/DL (ref 2–4)
GLUCOSE SERPL-MCNC: 95 MG/DL (ref 74–99)
GLUCOSE UR STRIP.AUTO-MCNC: NEGATIVE MG/DL
HCT VFR BLD AUTO: 41 % (ref 35–45)
HGB BLD-MCNC: 13.6 G/DL (ref 12–16)
HGB UR QL STRIP: ABNORMAL
IMM GRANULOCYTES # BLD AUTO: 0 K/UL (ref 0–0.04)
IMM GRANULOCYTES NFR BLD AUTO: 0 % (ref 0–0.5)
KETONES UR QL STRIP.AUTO: 15 MG/DL
LEUKOCYTE ESTERASE UR QL STRIP.AUTO: ABNORMAL
LYMPHOCYTES # BLD: 2.1 K/UL (ref 0.9–3.6)
LYMPHOCYTES NFR BLD: 20 % (ref 21–52)
MCH RBC QN AUTO: 31.5 PG (ref 24–34)
MCHC RBC AUTO-ENTMCNC: 33.2 G/DL (ref 31–37)
MCV RBC AUTO: 94.9 FL (ref 78–100)
MONOCYTES # BLD: 0.8 K/UL (ref 0.05–1.2)
MONOCYTES NFR BLD: 8 % (ref 3–10)
MUCOUS THREADS URNS QL MICRO: NEGATIVE /LPF
NEUTS SEG # BLD: 7.5 K/UL (ref 1.8–8)
NEUTS SEG NFR BLD: 70 % (ref 40–73)
NITRITE UR QL STRIP.AUTO: NEGATIVE
NRBC # BLD: 0 K/UL (ref 0–0.01)
NRBC BLD-RTO: 0 PER 100 WBC
PH UR STRIP: 6 (ref 5–8)
PLATELET # BLD AUTO: 329 K/UL (ref 135–420)
PMV BLD AUTO: 9.6 FL (ref 9.2–11.8)
POTASSIUM SERPL-SCNC: 3.5 MMOL/L (ref 3.5–5.5)
PROT SERPL-MCNC: 8.2 G/DL (ref 6.4–8.2)
PROT UR STRIP-MCNC: NEGATIVE MG/DL
RBC # BLD AUTO: 4.32 M/UL (ref 4.2–5.3)
RBC #/AREA URNS HPF: ABNORMAL /HPF (ref 0–2)
SODIUM SERPL-SCNC: 139 MMOL/L (ref 136–145)
SP GR UR REFRACTOMETRY: 1.02 (ref 1–1.03)
TROPONIN I SERPL HS-MCNC: 6 NG/L (ref 0–54)
TROPONIN I SERPL HS-MCNC: 7 NG/L (ref 0–54)
UROBILINOGEN UR QL STRIP.AUTO: 1 EU/DL (ref 0.2–1)
WBC # BLD AUTO: 10.6 K/UL (ref 4.6–13.2)
WBC URNS QL MICRO: ABNORMAL /HPF (ref 0–4)

## 2024-09-02 PROCEDURE — 85025 COMPLETE CBC W/AUTO DIFF WBC: CPT

## 2024-09-02 PROCEDURE — 84484 ASSAY OF TROPONIN QUANT: CPT

## 2024-09-02 PROCEDURE — 99284 EMERGENCY DEPT VISIT MOD MDM: CPT

## 2024-09-02 PROCEDURE — 6370000000 HC RX 637 (ALT 250 FOR IP): Performed by: FAMILY MEDICINE

## 2024-09-02 PROCEDURE — 93005 ELECTROCARDIOGRAM TRACING: CPT | Performed by: FAMILY MEDICINE

## 2024-09-02 PROCEDURE — 36415 COLL VENOUS BLD VENIPUNCTURE: CPT

## 2024-09-02 PROCEDURE — 80053 COMPREHEN METABOLIC PANEL: CPT

## 2024-09-02 PROCEDURE — 81001 URINALYSIS AUTO W/SCOPE: CPT

## 2024-09-02 RX ORDER — CLONIDINE HYDROCHLORIDE 0.1 MG/1
0.1 TABLET ORAL
Status: COMPLETED | OUTPATIENT
Start: 2024-09-02 | End: 2024-09-02

## 2024-09-02 RX ORDER — CLONIDINE HYDROCHLORIDE 0.1 MG/1
0.1 TABLET ORAL
Status: DISCONTINUED | OUTPATIENT
Start: 2024-09-02 | End: 2024-09-02

## 2024-09-02 RX ADMIN — CLONIDINE HYDROCHLORIDE 0.1 MG: 0.1 TABLET ORAL at 15:58

## 2024-09-02 ASSESSMENT — PAIN SCALES - GENERAL: PAINLEVEL_OUTOF10: 0

## 2024-09-02 ASSESSMENT — PAIN - FUNCTIONAL ASSESSMENT: PAIN_FUNCTIONAL_ASSESSMENT: 0-10

## 2024-09-02 NOTE — ED TRIAGE NOTES
Pt states she was at work and wasn't feeling good so she went to wellness there and her /121. She stated she used to be on amlodipine so she took one of her daughters pills but it didn't work. States she has a slight headache.

## 2024-09-02 NOTE — DISCHARGE INSTRUCTIONS
As we spoke your blood pressure much improved.  At this point I am going ahead and discharge you it does appear that you are dehydrated.  All your lab work points to dehydration.  It is probably from overdoing it this weekend.  Follow-up with your primary care doctor.  Return to the emergency department if you have any questions or concerns

## 2024-09-02 NOTE — ED PROVIDER NOTES
[facility] EMERGENCY DEPT  EMERGENCY DEPARTMENT HISTORY AND PHYSICAL EXAM      Date: 9/2/2024  Patient Name: Jocelyne Borrero  MRN: [id number]  Birthdate 1975  Date of evaluation: 9/2/2024  Provider: Syd Brown DO   Note Started: 3:42 PM EDT 9/2/24    HISTORY OF PRESENT ILLNESS     Chief Complaint   Patient presents with    Hypertension       History Provided By: Patient    HPI: Jocelyne Borrero is a 49 y.o. female patient comes in stating that she admits to overdoing it this weekend including adult beverages.  Not on any blood pressure medicine at this time as she had a weight loss from gastric bypass.  However she said her head feels funny felt woozy, still feeling little woozy denies any nausea or vomiting.  She did take one of her daughters blood pressure medicines 5 mg of amlodipine at approximately 1 PM today.  This is the medicine she used to take.  She denies any chest pains.  States her head just feels funny.  She states it does not hurt.  She last saw her physician for gastric bypass in June and was told that her blood pressure at that time was normal.    PAST MEDICAL HISTORY   Past Medical History:  Past Medical History:   Diagnosis Date    Anxiety and depression     Back pain     Constipation     Diverticulitis     Fibroids     GERD (gastroesophageal reflux disease)     HTN (hypertension)     no meds    Knee pain     Menopause     Morbid obesity (HCC)     OA (osteoarthritis)     of knees       Past Surgical History:  Past Surgical History:   Procedure Laterality Date    BREAST REDUCTION SURGERY  2016?    BREAST SURGERY      reduction    COLONOSCOPY      GASTRIC BYPASS SURGERY      GASTRIC BYPASS SURGERY  11/30/2020    GYN      btl and partial hysterectomy    HYSTERECTOMY (CERVIX STATUS UNKNOWN)  Sept?  2020    KAMILAH done at [facility] w/ Dr Wheatley    HYSTERECTOMY, VAGINAL  09/2020    OVARY REMOVAL      UPPER GASTROINTESTINAL ENDOSCOPY N/A 7/16/2024    EGD with dilation performed by Mario Vu,

## 2024-09-03 DIAGNOSIS — E66.01 MORBID OBESITY: ICD-10-CM

## 2024-09-04 ENCOUNTER — OFFICE VISIT (OUTPATIENT)
Facility: CLINIC | Age: 49
End: 2024-09-04
Payer: COMMERCIAL

## 2024-09-04 VITALS
TEMPERATURE: 96.8 F | WEIGHT: 229.2 LBS | HEART RATE: 71 BPM | DIASTOLIC BLOOD PRESSURE: 78 MMHG | OXYGEN SATURATION: 100 % | BODY MASS INDEX: 45 KG/M2 | SYSTOLIC BLOOD PRESSURE: 150 MMHG | RESPIRATION RATE: 18 BRPM | HEIGHT: 60 IN

## 2024-09-04 DIAGNOSIS — I10 PRIMARY HYPERTENSION: Primary | ICD-10-CM

## 2024-09-04 PROCEDURE — 3077F SYST BP >= 140 MM HG: CPT | Performed by: STUDENT IN AN ORGANIZED HEALTH CARE EDUCATION/TRAINING PROGRAM

## 2024-09-04 PROCEDURE — 3078F DIAST BP <80 MM HG: CPT | Performed by: STUDENT IN AN ORGANIZED HEALTH CARE EDUCATION/TRAINING PROGRAM

## 2024-09-04 PROCEDURE — 99214 OFFICE O/P EST MOD 30 MIN: CPT | Performed by: STUDENT IN AN ORGANIZED HEALTH CARE EDUCATION/TRAINING PROGRAM

## 2024-09-04 RX ORDER — AMLODIPINE BESYLATE 5 MG/1
5 TABLET ORAL DAILY
Qty: 40 TABLET | Refills: 0 | Status: SHIPPED | OUTPATIENT
Start: 2024-09-04

## 2024-09-04 SDOH — ECONOMIC STABILITY: FOOD INSECURITY: WITHIN THE PAST 12 MONTHS, THE FOOD YOU BOUGHT JUST DIDN'T LAST AND YOU DIDN'T HAVE MONEY TO GET MORE.: NEVER TRUE

## 2024-09-04 SDOH — ECONOMIC STABILITY: FOOD INSECURITY: WITHIN THE PAST 12 MONTHS, YOU WORRIED THAT YOUR FOOD WOULD RUN OUT BEFORE YOU GOT MONEY TO BUY MORE.: NEVER TRUE

## 2024-09-04 SDOH — ECONOMIC STABILITY: INCOME INSECURITY: HOW HARD IS IT FOR YOU TO PAY FOR THE VERY BASICS LIKE FOOD, HOUSING, MEDICAL CARE, AND HEATING?: NOT VERY HARD

## 2024-09-04 NOTE — PROGRESS NOTES
Jocelyne Heatonrandolph Borrero presents today for   Chief Complaint   Patient presents with    Follow-up     ER follow up from 9/2       Is someone accompanying this pt? no    Is the patient using any DME equipment during OV? no    Health Maintenance Due   Topic Date Due    HIV screen  Never done    Hepatitis C screen  Never done    Hepatitis B vaccine (1 of 3 - 19+ 3-dose series) Never done    DTaP/Tdap/Td vaccine (1 - Tdap) Never done    Flu vaccine (1) Never done    COVID-19 Vaccine (3 - 2023-24 season) 09/01/2024       \"Have you been to the ER, urgent care clinic since your last visit?  Hospitalized since your last visit?\"    YES - When: approximately 2 days ago.  Where and Why: [facility] ER for elevated blood pressure.    “Have you seen or consulted any other health care providers outside of [facility] since your last visit?”    NO

## 2024-09-04 NOTE — PROGRESS NOTES
SICK VISIT     Jocelyne Borrero (: 1975) is a 49 y.o. female here for evaluation of the following chief concerns(s):  Follow-up (ER follow up from )       ASSESSMENT/PLAN:  1. Primary hypertension    No orders of the defined types were placed in this encounter.    Hypertension-uncontrolled.  Restart amlodipine.  Follow-up in 1 month for nurse visit for blood pressure recheck.    FU as scheduled for chronic disease    Patient agrees with plan as above and has no additional questions at this time.     SUBJECTIVE/OBJECTIVE:    Patient presents for ER follow-up    Was seen at [facility] ER for high blood pressure.  Reviewed ER note, labs, imaging.    No chest pain or shortness of breath.  Has been on amlodipine in the past, tolerated well.  History of angioedema with lisinopril.    Vitals:    24 1155   BP: (!) 150/78   Site: Left Upper Arm   Position: Sitting   Cuff Size: Thigh   Pulse: 71   Resp: 18   Temp: 96.8 °F (36 °C)   TempSrc: Temporal   SpO2: 100%   Weight: 104 kg (229 lb 3.2 oz)   Height: 1.524 m (5')      Body mass index is 44.76 kg/m².     Gen: NAD, well appearing   Heart: RRR, no m/g/r  Lungs: CTA bilaterally, no wheezing, breathing comfortably  Psych: cooperative. Appropriate mood and affect.    Medical decision making complexity: moderate    I have discussed the diagnosis with the patient and the intended plan as seen in the above orders.  The patient has received an after-visit summary and questions were answered concerning future plans.  I have discussed medication side effects and warnings with the patient as well. I have reviewed the plan of care with the patient, accepted their input and they are in agreement with the treatment goals. Previous lab and imaging results were reviewed by me.     Ami Colmenares MD   Family Medicine

## 2024-09-05 ENCOUNTER — TELEPHONE (OUTPATIENT)
Age: 49
End: 2024-09-05

## 2024-09-05 RX ORDER — SEMAGLUTIDE 0.25 MG/.5ML
INJECTION, SOLUTION SUBCUTANEOUS
Qty: 4 ML | Refills: 0 | OUTPATIENT
Start: 2024-09-05

## 2024-09-05 RX ORDER — SEMAGLUTIDE 0.25 MG/.5ML
0.25 INJECTION, SOLUTION SUBCUTANEOUS
Qty: 2 ML | Refills: 0 | OUTPATIENT
Start: 2024-09-05

## 2024-09-05 RX ORDER — SEMAGLUTIDE 0.5 MG/.5ML
0.5 INJECTION, SOLUTION SUBCUTANEOUS
Qty: 2 ML | Refills: 0 | Status: SHIPPED | OUTPATIENT
Start: 2024-09-05

## 2024-09-07 DIAGNOSIS — E66.01 MORBID OBESITY (HCC): ICD-10-CM

## 2024-09-11 RX ORDER — SEMAGLUTIDE 0.25 MG/.5ML
INJECTION, SOLUTION SUBCUTANEOUS
Qty: 4 ML | Refills: 0 | OUTPATIENT
Start: 2024-09-11

## 2024-10-02 RX ORDER — SEMAGLUTIDE 1 MG/.5ML
1 INJECTION, SOLUTION SUBCUTANEOUS
Qty: 2 ML | Refills: 0 | Status: SHIPPED | OUTPATIENT
Start: 2024-10-02

## 2024-10-28 NOTE — TELEPHONE ENCOUNTER
Requested Prescriptions     Pending Prescriptions Disp Refills    WEGOVY 1 MG/0.5ML SOAJ SC injection [Pharmacy Med Name: Wegovy 1 MG/0.5ML Subcutaneous Solution Auto-injector] 4 mL 0     Sig: INJECT 1 DOSE (1MG) SUBCUTANEOUSLY ONCE A WEEK ON  WEEKS  9  TO  12   10/2/24-8/1/24

## 2024-10-29 RX ORDER — SEMAGLUTIDE 1.7 MG/.75ML
1.7 INJECTION, SOLUTION SUBCUTANEOUS
Qty: 3 ML | Refills: 0 | Status: SHIPPED | OUTPATIENT
Start: 2024-10-29

## 2024-11-01 ENCOUNTER — OFFICE VISIT (OUTPATIENT)
Age: 49
End: 2024-11-01
Payer: COMMERCIAL

## 2024-11-01 VITALS
BODY MASS INDEX: 42.6 KG/M2 | HEIGHT: 60 IN | SYSTOLIC BLOOD PRESSURE: 134 MMHG | DIASTOLIC BLOOD PRESSURE: 90 MMHG | HEART RATE: 83 BPM | TEMPERATURE: 97.6 F | RESPIRATION RATE: 18 BRPM | WEIGHT: 217 LBS | OXYGEN SATURATION: 98 %

## 2024-11-01 DIAGNOSIS — Z79.899 FOLLOW-UP ENCOUNTER INVOLVING MEDICATION: ICD-10-CM

## 2024-11-01 DIAGNOSIS — Z71.3 ENCOUNTER FOR WEIGHT LOSS COUNSELING: ICD-10-CM

## 2024-11-01 DIAGNOSIS — Z98.84 S/P GASTRIC BYPASS: ICD-10-CM

## 2024-11-01 DIAGNOSIS — E66.01 MORBID OBESITY: Primary | ICD-10-CM

## 2024-11-01 PROCEDURE — 3080F DIAST BP >= 90 MM HG: CPT | Performed by: NURSE PRACTITIONER

## 2024-11-01 PROCEDURE — 3075F SYST BP GE 130 - 139MM HG: CPT | Performed by: NURSE PRACTITIONER

## 2024-11-01 PROCEDURE — 99213 OFFICE O/P EST LOW 20 MIN: CPT | Performed by: NURSE PRACTITIONER

## 2024-11-01 RX ORDER — SEMAGLUTIDE 2.4 MG/.75ML
2.4 INJECTION, SOLUTION SUBCUTANEOUS
Qty: 3 ML | Refills: 5 | Status: SHIPPED | OUTPATIENT
Start: 2024-11-01

## 2024-11-01 ASSESSMENT — ENCOUNTER SYMPTOMS
ABDOMINAL PAIN: 0
NAUSEA: 0
VOMITING: 0
CONSTIPATION: 1
COUGH: 0
DIARRHEA: 0
SHORTNESS OF BREATH: 0

## 2024-11-01 NOTE — PROGRESS NOTES
Bariatric Postoperative Nurse Note      Jocelyne Borrero is a 49 y.o. female status post laparoscopic gastric bypass surgery performed on 09/23/2020.    All Post-Ops (including two weeks)  -# of grams of protein daily? 50-60g  -sources of protein? Chicken, Eggs, Fish, Turkey  -# of oz of sugar free fluids from all sources daily?  64oz daily.  -Nausea? No  -Vomiting? No  -Difficulty swallow/food sticking? Yes.  -Heartburn/regurgitation? No  -Character of bowel movements (diarrhea/constipation/bloody stools?) alternating between constipation and regularity  -Which multivitamin product are you taking? Procare   -What dose and how frequently are you taking calcium citrate? 500mg chews twice daily.  - from any iron-containing multivitamin by 2 hours? Yes  -Ulcer risk exposures:   NSAID No  Tobacco No  Alcohol No  Steroids No  -Minutes of physical activity and what type? Walking daily + 13k steps working at [employer].

## 2024-11-01 NOTE — PROGRESS NOTES
Bariatric Postoperative Progress Note    Chief Complaint   Patient presents with    Follow-up     LGBP (09/23/2020)       History of Present Illness  The patient is a 49-year-old female presenting for weight management. She is status post laparoscopic gastric bypass surgery performed on 09/23/2020 and is currently on Wegovy 1 mg.    She reports a positive experience with Wegovy, having lost 20 pounds since her last visit. She does not experience any side effects such as nausea or vomiting but does have occasional constipation, which she manages with as needed Milk of Magnesia. She has recently completed her 1 mg dose of Wegovy and is due to start the 1.7 mg dose on Tuesday. She has already collected this from the pharmacy. She maintains a good intake of protein and fluids, continues to take her vitamins, and remains active at work. She administers her injections in her stomach, alternating sides. She notes a decrease in appetite, often feeling full after consuming only a small amount of food.    She occasionally monitors her blood pressure at home, which typically reads around 130 systolic. She has an upcoming appointment with her primary care physician on 11/13/2024 to assess her blood pressure. She continues to take amlodipine, including a dose this morning.    She has lost 20 lbs since starting Wegovy which is 8% of her starting weight of 237 lbs.    See nurse note for additional subjective information.         11/1/2024     9:32 AM 9/4/2024    11:55 AM 9/2/2024     3:06 PM 8/1/2024    10:33 AM 7/16/2024     1:38 PM 7/10/2024     8:28 AM 6/11/2024    11:06 AM   Weight Loss Metrics   Pre-op weight (manual entry) 309 lbs   309 lbs      Height 5' 0\" 5' 0\" 5' 0\" 5' 0\" 5' 0\" 5' 0\" 5' 0\"   Weight - Scale 217 lbs 229 lbs 3 oz 230 lbs 237 lbs 229 lbs 240 lbs 14 oz 229 lbs 3 oz   BMI (Calculated) 42.5 kg/m2 44.9 kg/m2 45 kg/m2 46.4 kg/m2 44.8 kg/m2 47.1 kg/m2 44.9 kg/m2   Ideal body weight (manual entry) 120 lbs   120

## 2024-11-06 RX ORDER — AMLODIPINE BESYLATE 5 MG/1
5 TABLET ORAL DAILY
Qty: 30 TABLET | Refills: 0 | Status: SHIPPED | OUTPATIENT
Start: 2024-11-06

## 2024-11-13 ENCOUNTER — NURSE ONLY (OUTPATIENT)
Facility: CLINIC | Age: 49
End: 2024-11-13
Payer: COMMERCIAL

## 2024-11-13 VITALS — SYSTOLIC BLOOD PRESSURE: 122 MMHG | DIASTOLIC BLOOD PRESSURE: 80 MMHG

## 2024-11-13 DIAGNOSIS — I10 PRIMARY HYPERTENSION: Primary | ICD-10-CM

## 2024-11-13 PROCEDURE — 99211 OFF/OP EST MAY X REQ PHY/QHP: CPT | Performed by: STUDENT IN AN ORGANIZED HEALTH CARE EDUCATION/TRAINING PROGRAM

## 2024-11-13 PROCEDURE — 3079F DIAST BP 80-89 MM HG: CPT | Performed by: STUDENT IN AN ORGANIZED HEALTH CARE EDUCATION/TRAINING PROGRAM

## 2024-11-13 PROCEDURE — 3074F SYST BP LT 130 MM HG: CPT | Performed by: STUDENT IN AN ORGANIZED HEALTH CARE EDUCATION/TRAINING PROGRAM

## 2024-11-13 RX ORDER — AMLODIPINE BESYLATE 5 MG/1
5 TABLET ORAL DAILY
Qty: 90 TABLET | Refills: 1 | Status: SHIPPED | OUTPATIENT
Start: 2024-11-13

## 2024-11-13 NOTE — PROGRESS NOTES
Patient here for nurse visit to recheck blood pressure per order of Ami Colmenares MD.     Are you currently experiencing any of the following:  Dizziness: no  Blurry vision: no  Headache: no  Chest pain or discomfort: no  Speech difficulties: no    In the past 30-60 minutes have you:  Smoked: no  Drank caffeine or alcohol: no  Participated in any strenuous activity: no    Patient was seated for at least 5 minutes before blood pressure was read. Today's readings were relayed to Aureliano Abrams NP. Aureliano bArams NP said to continue current treatment and follow up as scheduled. Relayed information to patient and they expressed understanding.

## 2024-12-25 ENCOUNTER — HOSPITAL ENCOUNTER (EMERGENCY)
Age: 49
Discharge: HOME | End: 2024-12-26
Attending: EMERGENCY MEDICINE
Payer: COMMERCIAL

## 2024-12-25 ENCOUNTER — APPOINTMENT (OUTPATIENT)
Age: 49
End: 2024-12-25
Payer: COMMERCIAL

## 2024-12-25 VITALS
HEART RATE: 85 BPM | SYSTOLIC BLOOD PRESSURE: 151 MMHG | OXYGEN SATURATION: 100 % | RESPIRATION RATE: 18 BRPM | DIASTOLIC BLOOD PRESSURE: 84 MMHG | TEMPERATURE: 98.3 F

## 2024-12-25 DIAGNOSIS — S09.90XA INJURY OF HEAD, INITIAL ENCOUNTER: ICD-10-CM

## 2024-12-25 DIAGNOSIS — S01.81XA LACERATION OF FOREHEAD, INITIAL ENCOUNTER: Primary | ICD-10-CM

## 2024-12-25 PROCEDURE — 70450 CT HEAD/BRAIN W/O DYE: CPT

## 2024-12-25 PROCEDURE — 12053 INTMD RPR FACE/MM 5.1-7.5 CM: CPT

## 2024-12-25 PROCEDURE — 72125 CT NECK SPINE W/O DYE: CPT

## 2024-12-25 PROCEDURE — 99284 EMERGENCY DEPT VISIT MOD MDM: CPT

## 2024-12-26 PROCEDURE — 6370000000 HC RX 637 (ALT 250 FOR IP): Performed by: EMERGENCY MEDICINE

## 2024-12-26 PROCEDURE — 6360000002 HC RX W HCPCS: Performed by: EMERGENCY MEDICINE

## 2024-12-26 PROCEDURE — 90471 IMMUNIZATION ADMIN: CPT | Performed by: EMERGENCY MEDICINE

## 2024-12-26 PROCEDURE — 90715 TDAP VACCINE 7 YRS/> IM: CPT | Performed by: EMERGENCY MEDICINE

## 2024-12-26 PROCEDURE — 6370000000 HC RX 637 (ALT 250 FOR IP)

## 2024-12-26 RX ORDER — GINSENG 100 MG
CAPSULE ORAL
Status: COMPLETED | OUTPATIENT
Start: 2024-12-26 | End: 2024-12-26

## 2024-12-26 RX ORDER — GINSENG 100 MG
CAPSULE ORAL
Status: COMPLETED
Start: 2024-12-26 | End: 2024-12-26

## 2024-12-26 RX ORDER — BACITRACIN ZINC 500 [USP'U]/G
OINTMENT TOPICAL
Status: DISCONTINUED | OUTPATIENT
Start: 2024-12-26 | End: 2024-12-26

## 2024-12-26 RX ORDER — LIDOCAINE HYDROCHLORIDE 10 MG/ML
10 INJECTION, SOLUTION INFILTRATION; PERINEURAL ONCE
Status: COMPLETED | OUTPATIENT
Start: 2024-12-26 | End: 2024-12-26

## 2024-12-26 RX ADMIN — LIDOCAINE HYDROCHLORIDE 10 ML: 10 INJECTION, SOLUTION INFILTRATION; PERINEURAL at 00:43

## 2024-12-26 RX ADMIN — CEPHALEXIN 250 MG: 250 CAPSULE ORAL at 02:23

## 2024-12-26 RX ADMIN — Medication: at 02:24

## 2024-12-26 RX ADMIN — TETANUS TOXOID, REDUCED DIPHTHERIA TOXOID AND ACELLULAR PERTUSSIS VACCINE, ADSORBED 0.5 ML: 5; 2.5; 8; 8; 2.5 SUSPENSION INTRAMUSCULAR at 00:44

## 2024-12-26 RX ADMIN — BACITRACIN: 500 OINTMENT TOPICAL at 02:24

## 2024-12-26 NOTE — ED TRIAGE NOTES
Pt states she has been drinking today, admits to 2 double shot drinks, pt states prior to arrival she fell on the ground and hit her head, denies loc, denies blood thinners. Pt has a pprox 5-6 cm deep laceration to R forehead. No active bleeding at this time. Unknown last tdap.

## 2024-12-26 NOTE — ED PROVIDER NOTES
[facility] EMERGENCY DEPT  EMERGENCY DEPARTMENT ENCOUNTER      Pt Name: Jocelyne Borrero  MRN: [id number]  Birthdate 1975  Date of evaluation: 12/25/2024  Provider: Raheem Romero MD    CHIEF COMPLAINT       Chief Complaint   Patient presents with    Laceration       HPI:  Jocelyne Borrero is a 49 y.o. female who presents to the emergency department pt c/o trip/fall onto scalp.  No loc. No other injury. C/o lg wound to scalp. Admits to etoh prior.  Not preg per pt.      HPI    Nursing Notes were reviewed.    REVIEW OF SYSTEMS    (2-9 systems for level 4, 10 or more for level 5)     Review of Systems    Except as noted above the remainder of the review of systems was reviewed and negative.       PAST MEDICAL HISTORY     Past Medical History:   Diagnosis Date    Anxiety and depression     Back pain     Constipation     Diverticulitis     Fibroids     GERD (gastroesophageal reflux disease)     HTN (hypertension)     no meds    Knee pain     Menopause     Morbid obesity     OA (osteoarthritis)     of knees         SURGICAL HISTORY       Past Surgical History:   Procedure Laterality Date    BREAST REDUCTION SURGERY  2016?    BREAST SURGERY      reduction    COLONOSCOPY      GASTRIC BYPASS SURGERY      GASTRIC BYPASS SURGERY  11/30/2020    GYN      btl and partial hysterectomy    HYSTERECTOMY (CERVIX STATUS UNKNOWN)  Sept?  2020    [facility] w/ Dr Wheatley    HYSTERECTOMY, VAGINAL  09/2020    OVARY REMOVAL      UPPER GASTROINTESTINAL ENDOSCOPY N/A 7/16/2024    EGD with dilation performed by Mario Vu MD at [facility] ENDOSCOPY         CURRENT MEDICATIONS       Previous Medications    AMLODIPINE (NORVASC) 5 MG TABLET    Take 1 tablet by mouth daily    CHOLECALCIFEROL (VITAMIN D3) 1.25 MG (89831 UT) CAPS    Take 1 capsule by mouth every 7 days    MULTIPLE VITAMINS-MINERALS (BARIATRIC MULTIVITAMINS/IRON PO)    Take by mouth daily [employer]    NONFORMULARY    Calcium citrate 500 milligram 3 times a

## 2024-12-26 NOTE — DISCHARGE INSTRUCTIONS
Sutures need to be removed in 5-7 days.   Return for pain, redness/drainage/bleeding, fever not resolving with motrin or tylenol, shortness of breath, vomiting, decreased fluid intake, weakness, numbness, dizziness, or any change or concerns.  Avoid alcohol as discussed.

## 2025-01-01 DIAGNOSIS — K91.2 POSTOPERATIVE INTESTINAL MALABSORPTION: ICD-10-CM

## 2025-01-01 DIAGNOSIS — E55.9 VITAMIN D DEFICIENCY: ICD-10-CM

## 2025-01-01 DIAGNOSIS — Z98.84 S/P GASTRIC BYPASS: ICD-10-CM

## 2025-01-02 ENCOUNTER — HOSPITAL ENCOUNTER (EMERGENCY)
Age: 50
Discharge: HOME | End: 2025-01-02
Attending: EMERGENCY MEDICINE

## 2025-01-02 VITALS
BODY MASS INDEX: 41.03 KG/M2 | WEIGHT: 209 LBS | TEMPERATURE: 98 F | RESPIRATION RATE: 14 BRPM | HEIGHT: 60 IN | SYSTOLIC BLOOD PRESSURE: 151 MMHG | HEART RATE: 85 BPM | OXYGEN SATURATION: 98 % | DIASTOLIC BLOOD PRESSURE: 87 MMHG

## 2025-01-02 DIAGNOSIS — Z48.02 VISIT FOR SUTURE REMOVAL: Primary | ICD-10-CM

## 2025-01-02 NOTE — DISCHARGE INSTRUCTIONS
Over-the-counter bacitracin or Neosporin should be appropriate.  Over-the-counter Tylenol for headache.

## 2025-01-08 ENCOUNTER — TELEMEDICINE (OUTPATIENT)
Facility: CLINIC | Age: 50
End: 2025-01-08
Payer: COMMERCIAL

## 2025-01-08 ENCOUNTER — PATIENT MESSAGE (OUTPATIENT)
Facility: CLINIC | Age: 50
End: 2025-01-08

## 2025-01-08 DIAGNOSIS — S09.90XA ACUTE HEADACHE DUE TO TRAUMATIC INJURY OF HEAD: Primary | ICD-10-CM

## 2025-01-08 DIAGNOSIS — R42 LIGHTHEADEDNESS: ICD-10-CM

## 2025-01-08 DIAGNOSIS — I10 HTN (HYPERTENSION), BENIGN: ICD-10-CM

## 2025-01-08 PROCEDURE — 99214 OFFICE O/P EST MOD 30 MIN: CPT | Performed by: PHYSICIAN ASSISTANT

## 2025-01-08 RX ORDER — CHOLECALCIFEROL (VITAMIN D3) 1250 MCG
1 CAPSULE ORAL WEEKLY
Qty: 12 CAPSULE | Refills: 0 | OUTPATIENT
Start: 2025-01-08

## 2025-01-08 RX ORDER — AMLODIPINE BESYLATE 10 MG/1
10 TABLET ORAL DAILY
Qty: 90 TABLET | Refills: 0 | Status: SHIPPED | OUTPATIENT
Start: 2025-01-08

## 2025-01-08 SDOH — ECONOMIC STABILITY: FOOD INSECURITY: WITHIN THE PAST 12 MONTHS, THE FOOD YOU BOUGHT JUST DIDN'T LAST AND YOU DIDN'T HAVE MONEY TO GET MORE.: NEVER TRUE

## 2025-01-08 SDOH — ECONOMIC STABILITY: FOOD INSECURITY: WITHIN THE PAST 12 MONTHS, YOU WORRIED THAT YOUR FOOD WOULD RUN OUT BEFORE YOU GOT MONEY TO BUY MORE.: NEVER TRUE

## 2025-01-08 ASSESSMENT — ENCOUNTER SYMPTOMS
VOMITING: 0
NAUSEA: 0

## 2025-01-08 ASSESSMENT — PATIENT HEALTH QUESTIONNAIRE - PHQ9
2. FEELING DOWN, DEPRESSED OR HOPELESS: NOT AT ALL
SUM OF ALL RESPONSES TO PHQ QUESTIONS 1-9: 0
SUM OF ALL RESPONSES TO PHQ QUESTIONS 1-9: 0
SUM OF ALL RESPONSES TO PHQ9 QUESTIONS 1 & 2: 0
SUM OF ALL RESPONSES TO PHQ QUESTIONS 1-9: 0
SUM OF ALL RESPONSES TO PHQ QUESTIONS 1-9: 0
1. LITTLE INTEREST OR PLEASURE IN DOING THINGS: NOT AT ALL

## 2025-01-08 NOTE — PROGRESS NOTES
Jocelyne Borrero presents today for   Chief Complaint   Patient presents with    Fall     VV- patient reports falling on filomena day and hitting her head. Went to the ER and got stitches, had them removed and she was out of work. The other day she returned to work and started feeling dizzy and her head was hurting and her BP was elevated.        Health Maintenance reviewed and discussed and ordered per Provider.    Health Maintenance Due   Topic Date Due    HIV screen  Never done    Hepatitis C screen  Never done    Hepatitis B vaccine (1 of 3 - 19+ 3-dose series) Never done    Flu vaccine (1) Never done    COVID-19 Vaccine (3 - 2023-24 season) 09/01/2024    Breast cancer screen  10/27/2024   .      \"Have you been to the ER, urgent care clinic since your last visit?  Hospitalized since your last visit?\"    Yes-[facility]    “Have you seen or consulted any other health care providers outside our system since your last visit?”    NO

## 2025-01-08 NOTE — PROGRESS NOTES
2025    TELEHEALTH EVALUATION -- Audio/Visual    HPI:    Jocelyne Borrero (:  1975) has requested an audio/video evaluation for the following concern(s):    50 y/o presents for follow-up headaches and dizziness s/p head trauma and ED visits 24 and 25. States she'd had too much to drink on [place] day and fell and hit her head. Presented to [facility] ED and underwent a neg CT C-spine and a negative CT head wo contrast. Received sutures for a forehead laceration. Returned on 25 for suture removal.     C/o feeling some head numbness in some spots and c/o continued HA's. States she is taking Tylenol every day for HA, often BID-TID. HA's are considered mild and dull.     Reports she tried to return to work at [employer] this past Monday, 25. States while she was working, she started feeling dizzy (lightheaded). States she went to the medical personnel, and BP was 140/90. She was placed on a leave of absence Monday and yesterday. Denies dizziness since this event. Checked her BP last night and noted it to be elevated. Recheck of BP today remains a little elevated at 131/93.     BP Readings from Last 3 Encounters:   25 (!) 151/87   24 (!) 151/84   24 122/80   24    151/90, 134/90 10 min later  24      150/78 - Amlodipine 5 mg resumed.       Review of Systems   Eyes:  Negative for visual disturbance.   Gastrointestinal:  Negative for nausea and vomiting.   Neurological:  Positive for light-headedness (1 episode 2 days ago; sole incident) and headaches. Negative for speech difficulty and weakness.       Prior to Visit Medications    Medication Sig Taking? Authorizing Provider   amLODIPine (NORVASC) 5 MG tablet Take 1 tablet by mouth daily Yes Ami Colmenares MD   Semaglutide-Weight Management (WEGOVY) 2.4 MG/0.75ML SOAJ SC injection Inject 2.4 mg into the skin every 7 days Weeks 17 and beyond Yes Uzma Mercedes, APRN - NP   Semaglutide-Weight Management

## 2025-01-09 ENCOUNTER — TELEPHONE (OUTPATIENT)
Age: 50
End: 2025-01-09

## 2025-01-09 DIAGNOSIS — E66.01 MORBID OBESITY: ICD-10-CM

## 2025-01-09 DIAGNOSIS — Z98.84 S/P GASTRIC BYPASS: ICD-10-CM

## 2025-01-09 DIAGNOSIS — E55.9 VITAMIN D DEFICIENCY: ICD-10-CM

## 2025-01-09 DIAGNOSIS — K91.2 POSTOPERATIVE INTESTINAL MALABSORPTION: Primary | ICD-10-CM

## 2025-01-09 DIAGNOSIS — K91.2 POSTOPERATIVE INTESTINAL MALABSORPTION: ICD-10-CM

## 2025-01-09 RX ORDER — CHOLECALCIFEROL (VITAMIN D3) 1250 MCG
50000 CAPSULE ORAL
Qty: 12 CAPSULE | Refills: 1 | OUTPATIENT
Start: 2025-01-09

## 2025-01-09 NOTE — TELEPHONE ENCOUNTER
Pt called request refill vit d rx has upcoming appt in 2/2025 do you want her to have full set of labs she did have some in 6/2024/and 7/2024 but not all our required labs .  Thank you!

## 2025-01-10 ENCOUNTER — PATIENT MESSAGE (OUTPATIENT)
Facility: CLINIC | Age: 50
End: 2025-01-10

## 2025-01-13 ENCOUNTER — TELEPHONE (OUTPATIENT)
Age: 50
End: 2025-01-13

## 2025-01-13 NOTE — TELEPHONE ENCOUNTER
Left pt message that she needs to have labs drawn before her upcoming appt and to call us back for any questions or concerns

## 2025-01-13 NOTE — TELEPHONE ENCOUNTER
Needs annual bariatric labs as her last ones were over a year ago. Lab orders placed. Would recommend getting these completed prior to visit at [facility] office on 2/3.

## 2025-02-21 DIAGNOSIS — E66.01 MORBID OBESITY (HCC): ICD-10-CM

## 2025-02-25 ENCOUNTER — CLINICAL DOCUMENTATION (OUTPATIENT)
Age: 50
End: 2025-02-25

## 2025-02-25 NOTE — PROGRESS NOTES
As of 2/21/2025:  Height: 5'  Weight: 205 lbs  BMI: 40    Continues to follow a high protein/low carb/low calorie (<1500 daily) diet with at least 150 minutes of exercise weekly.   Doing well on Wegovy 2.4 mg, denies any side effects and desires to continue.   She has lost 32 lbs since starting Wegovy which is 13% of her starting weight of 237 lbs.     Current Outpatient Medications on File Prior to Visit   Medication Sig Dispense Refill    amLODIPine (NORVASC) 10 MG tablet Take 1 tablet by mouth daily 90 tablet 0    Semaglutide-Weight Management (WEGOVY) 2.4 MG/0.75ML SOAJ SC injection Inject 2.4 mg into the skin every 7 days Weeks 17 and beyond 3 mL 5    Cholecalciferol (VITAMIN D3) 1.25 MG (83508 UT) CAPS Take 1 capsule by mouth every 7 days 12 capsule 1    ondansetron (ZOFRAN-ODT) 4 MG disintegrating tablet Take 1 tablet by mouth 3 times daily as needed for Nausea or Vomiting (Patient not taking: Reported on 1/8/2025) 10 tablet 0    omeprazole (PRILOSEC) 40 MG delayed release capsule Take 1 capsule by mouth every morning (before breakfast) 20 capsule 5    NONFORMULARY Calcium citrate 500 milligram 3 times a day      Multiple Vitamins-Minerals (BARIATRIC MULTIVITAMINS/IRON PO) Take by mouth daily [employer] [facility]       No current facility-administered medications on file prior to visit.

## 2025-03-11 DIAGNOSIS — E66.01 MORBID OBESITY (HCC): ICD-10-CM

## 2025-03-18 ENCOUNTER — HOSPITAL ENCOUNTER (EMERGENCY)
Age: 50
Discharge: HOME | End: 2025-03-18
Attending: EMERGENCY MEDICINE
Payer: COMMERCIAL

## 2025-03-18 VITALS
DIASTOLIC BLOOD PRESSURE: 85 MMHG | SYSTOLIC BLOOD PRESSURE: 138 MMHG | TEMPERATURE: 98.4 F | BODY MASS INDEX: 40.05 KG/M2 | WEIGHT: 204 LBS | OXYGEN SATURATION: 100 % | HEART RATE: 74 BPM | RESPIRATION RATE: 15 BRPM | HEIGHT: 60 IN

## 2025-03-18 DIAGNOSIS — R51.9 NONINTRACTABLE EPISODIC HEADACHE, UNSPECIFIED HEADACHE TYPE: Primary | ICD-10-CM

## 2025-03-18 PROCEDURE — 6370000000 HC RX 637 (ALT 250 FOR IP): Performed by: EMERGENCY MEDICINE

## 2025-03-18 PROCEDURE — 99283 EMERGENCY DEPT VISIT LOW MDM: CPT

## 2025-03-18 RX ORDER — BUTALBITAL, ACETAMINOPHEN AND CAFFEINE 50; 325; 40 MG/1; MG/1; MG/1
1 TABLET ORAL EVERY 4 HOURS PRN
Qty: 180 TABLET | Refills: 3 | Status: SHIPPED | OUTPATIENT
Start: 2025-03-18

## 2025-03-18 RX ORDER — CLONIDINE HYDROCHLORIDE 0.1 MG/1
0.1 TABLET ORAL ONCE
Status: COMPLETED | OUTPATIENT
Start: 2025-03-18 | End: 2025-03-18

## 2025-03-18 RX ORDER — HYDROCHLOROTHIAZIDE 12.5 MG/1
25 CAPSULE ORAL EVERY MORNING
Qty: 28 CAPSULE | Refills: 0 | Status: SHIPPED | OUTPATIENT
Start: 2025-03-18 | End: 2025-04-01

## 2025-03-18 RX ORDER — BUTALBITAL, ACETAMINOPHEN AND CAFFEINE 50; 325; 40 MG/1; MG/1; MG/1
2 TABLET ORAL
Status: COMPLETED | OUTPATIENT
Start: 2025-03-18 | End: 2025-03-18

## 2025-03-18 RX ADMIN — CLONIDINE HYDROCHLORIDE 0.1 MG: 0.1 TABLET ORAL at 14:29

## 2025-03-18 RX ADMIN — BUTALBITAL, ACETAMINOPHEN, AND CAFFEINE 2 TABLET: 325; 50; 40 TABLET ORAL at 14:29

## 2025-03-18 ASSESSMENT — PAIN - FUNCTIONAL ASSESSMENT
PAIN_FUNCTIONAL_ASSESSMENT: 0-10
PAIN_FUNCTIONAL_ASSESSMENT: 0-10

## 2025-03-18 ASSESSMENT — PAIN SCALES - GENERAL
PAINLEVEL_OUTOF10: 8
PAINLEVEL_OUTOF10: 4

## 2025-03-18 ASSESSMENT — PAIN DESCRIPTION - LOCATION
LOCATION: HEAD
LOCATION: HEAD

## 2025-03-18 NOTE — ED TRIAGE NOTES
Started getting a H/A last night and has gotten worse. States she usually gets them when he BP goes up. States it hurts to touch her head

## 2025-03-18 NOTE — ED PROVIDER NOTES
Year: No   Interpersonal Safety: Not At Risk (12/25/2024)    Interpersonal Safety Domain Source: IP Abuse Screening     Physical abuse: Denies     Verbal abuse: Denies     Emotional abuse: Denies     Financial abuse: Denies     Sexual abuse: Denies   Utilities: Not At Risk (1/8/2025)    [facility] Utilities     Threatened with loss of utilities: No       PHYSICAL EXAM   Physical Exam  Vitals and nursing note reviewed.   Constitutional:       Appearance: Normal appearance.   HENT:      Head: Normocephalic and atraumatic.      Right Ear: External ear normal.      Left Ear: External ear normal.      Nose: Nose normal.      Mouth/Throat:      Mouth: Mucous membranes are moist.   Cardiovascular:      Rate and Rhythm: Normal rate and regular rhythm.   Pulmonary:      Effort: Pulmonary effort is normal.   Abdominal:      Palpations: Abdomen is soft.   Musculoskeletal:         General: Normal range of motion.      Cervical back: Normal range of motion.   Skin:     General: Skin is warm.      Capillary Refill: Capillary refill takes less than 2 seconds.   Neurological:      General: No focal deficit present.      Mental Status: She is alert and oriented to person, place, and time.      Cranial Nerves: No cranial nerve deficit.      Sensory: No sensory deficit.      Motor: No weakness.      Coordination: Coordination normal.      Gait: Gait normal.      Deep Tendon Reflexes: Reflexes normal.      Comments:    HEENT: Pupils equal, round, and reactive. Ears and throat appear normal.  Neurological: Alert and oriented. Strength 5/5 in upper and lower extremities bilaterally. No pronator drift. Finger-to-nose test normal bilaterally. Lower extremity strength 5/5 bilaterally. Dorsiflexion and plantar flexion intact. Extraocular movements intact. No focal neurologic deficits noted.         Psychiatric:         Mood and Affect: Mood normal.         Behavior: Behavior normal.                 ED COURSE and DIFFERENTIAL DIAGNOSIS/MDM   3:43 PM

## 2025-04-15 RX ORDER — SEMAGLUTIDE 2.4 MG/.75ML
2.4 INJECTION, SOLUTION SUBCUTANEOUS
Qty: 3 ML | Refills: 5 | OUTPATIENT
Start: 2025-04-15

## 2025-07-24 ENCOUNTER — OFFICE VISIT (OUTPATIENT)
Facility: CLINIC | Age: 50
End: 2025-07-24
Payer: COMMERCIAL

## 2025-07-24 VITALS
RESPIRATION RATE: 15 BRPM | TEMPERATURE: 98.4 F | DIASTOLIC BLOOD PRESSURE: 92 MMHG | HEART RATE: 75 BPM | BODY MASS INDEX: 43.78 KG/M2 | WEIGHT: 223 LBS | SYSTOLIC BLOOD PRESSURE: 139 MMHG | HEIGHT: 60 IN | OXYGEN SATURATION: 93 %

## 2025-07-24 DIAGNOSIS — B37.0 ORAL THRUSH: ICD-10-CM

## 2025-07-24 DIAGNOSIS — J02.9 SORE THROAT: Primary | ICD-10-CM

## 2025-07-24 PROCEDURE — 3080F DIAST BP >= 90 MM HG: CPT | Performed by: FAMILY MEDICINE

## 2025-07-24 PROCEDURE — 99213 OFFICE O/P EST LOW 20 MIN: CPT | Performed by: FAMILY MEDICINE

## 2025-07-24 PROCEDURE — 3075F SYST BP GE 130 - 139MM HG: CPT | Performed by: FAMILY MEDICINE

## 2025-07-24 RX ORDER — CLOTRIMAZOLE 10 MG/1
10 LOZENGE ORAL
Qty: 50 TABLET | Refills: 1 | Status: SHIPPED | OUTPATIENT
Start: 2025-07-24 | End: 2025-07-27 | Stop reason: SDUPTHER

## 2025-07-24 ASSESSMENT — PATIENT HEALTH QUESTIONNAIRE - PHQ9
SUM OF ALL RESPONSES TO PHQ QUESTIONS 1-9: 0
1. LITTLE INTEREST OR PLEASURE IN DOING THINGS: NOT AT ALL
2. FEELING DOWN, DEPRESSED OR HOPELESS: NOT AT ALL
SUM OF ALL RESPONSES TO PHQ QUESTIONS 1-9: 0

## 2025-07-24 NOTE — PROGRESS NOTES
Called pt in regards to confirming surgery date. Date confirmed, awaiting pt up coming appt to determine the exact surgery that will be performed before case request is placed. Pt verbalized understanding.    Jocelyne Treadwelles presents today for   Chief Complaint   Patient presents with    scratchy throat    bumps on tongue       Is someone accompanying this pt? no    Is the patient using any DME equipment during OV? no    Health Maintenance Due   Topic Date Due    HIV screen  Never done    Hepatitis C screen  Never done    Hepatitis B vaccine (2 of 3 - 19+ 3-dose series) 05/13/2015    COVID-19 Vaccine (3 - 2024-25 season) 09/01/2024    Breast cancer screen  10/27/2024         \"Have you been to the ER, urgent care clinic since your last visit?  Hospitalized since your last visit?\"    NO    “Have you seen or consulted any other health care providers outside our system since your last visit?”    NO    Have you had a mammogram?”   NO    Date of last Mammogram: 10/27/2022

## 2025-07-24 NOTE — PROGRESS NOTES
Chief Complaint   Patient presents with    scratchy throat    bumps on tongue     Assessment & Plan  Sore throat   Acute condition, new, Tylenol prn pain/fever. Warm salt water gargles as needed. Monitor symptoms.       Oral thrush   Acute condition, new, mild- Clotrimazole Casimiro's 5 times a day x 14 days.       Tylenol prn pain/fever  ER cp, sob, syncope, severe pain  Return in 27 days (on 8/20/2025), or if symptoms worsen or fail to improve.          Allergies   Allergen Reactions    Lisinopril Other (See Comments) and Swelling     Swollen lips  Other reaction(s): Not available  Made my face swell            Current Outpatient Medications   Medication Sig Dispense Refill    amLODIPine (NORVASC) 10 MG tablet Take 1 tablet by mouth daily 90 tablet 0    NONFORMULARY Calcium citrate 500 milligram 3 times a day      Multiple Vitamins-Minerals (BARIATRIC MULTIVITAMINS/IRON PO) Take by mouth daily [facility]      clotrimazole (MYCELEX) 10 MG casimiro Take 1 tablet by mouth 5 times daily for 14 days 70 tablet 0    butalbital-acetaminophen-caffeine (FIORICET, ESGIC) -40 MG per tablet Take 1 tablet by mouth every 4 hours as needed for Headaches (Patient not taking: Reported on 7/24/2025) 180 tablet 3    Semaglutide-Weight Management (WEGOVY) 2.4 MG/0.75ML SOAJ SC injection Inject 2.4 mg into the skin every 7 days Weeks 17 and beyond (Patient not taking: Reported on 7/24/2025) 3 mL 5    ondansetron (ZOFRAN-ODT) 4 MG disintegrating tablet Take 1 tablet by mouth 3 times daily as needed for Nausea or Vomiting (Patient not taking: Reported on 7/24/2025) 10 tablet 0    omeprazole (PRILOSEC) 40 MG delayed release capsule Take 1 capsule by mouth every morning (before breakfast) (Patient not taking: Reported on 7/24/2025) 20 capsule 5     No current facility-administered medications for this visit.          Subjective:     HPI  Presents today with complaint of scratchy throat and bumps on her tongue. Denies fever,

## 2025-07-25 ENCOUNTER — TELEPHONE (OUTPATIENT)
Facility: CLINIC | Age: 50
End: 2025-07-25

## 2025-07-25 NOTE — TELEPHONE ENCOUNTER
[facility] pharmacy calls for clarification on prescription for clotrimazole 10 mg.  Your directions say take 1 tablet by mouth 5 times daily for 20 days and you sent in 50 tablets.  Please send new rx with quantity of 100 or change directions.  You can either send a new rx or call [facility] at [phone]

## 2025-07-27 RX ORDER — CLOTRIMAZOLE 10 MG/1
10 LOZENGE ORAL
Qty: 70 TABLET | Refills: 0 | Status: SHIPPED | OUTPATIENT
Start: 2025-07-27 | End: 2025-08-10

## 2025-09-05 ENCOUNTER — OFFICE VISIT (OUTPATIENT)
Facility: CLINIC | Age: 50
End: 2025-09-05
Payer: MEDICAID

## 2025-09-05 VITALS
OXYGEN SATURATION: 100 % | BODY MASS INDEX: 44.17 KG/M2 | DIASTOLIC BLOOD PRESSURE: 89 MMHG | SYSTOLIC BLOOD PRESSURE: 148 MMHG | WEIGHT: 225 LBS | HEART RATE: 68 BPM | HEIGHT: 60 IN | TEMPERATURE: 98.2 F | RESPIRATION RATE: 15 BRPM

## 2025-09-05 DIAGNOSIS — B02.9 HERPES ZOSTER WITHOUT COMPLICATION: Primary | ICD-10-CM

## 2025-09-05 PROCEDURE — 3079F DIAST BP 80-89 MM HG: CPT | Performed by: FAMILY MEDICINE

## 2025-09-05 PROCEDURE — 99213 OFFICE O/P EST LOW 20 MIN: CPT | Performed by: FAMILY MEDICINE

## 2025-09-05 PROCEDURE — 3077F SYST BP >= 140 MM HG: CPT | Performed by: FAMILY MEDICINE

## 2025-09-05 RX ORDER — VALACYCLOVIR HYDROCHLORIDE 1 G/1
1000 TABLET, FILM COATED ORAL 3 TIMES DAILY
Qty: 21 TABLET | Refills: 0 | Status: SHIPPED | OUTPATIENT
Start: 2025-09-05 | End: 2025-09-12

## 2025-09-05 ASSESSMENT — ENCOUNTER SYMPTOMS
COUGH: 0
ABDOMINAL PAIN: 0
WHEEZING: 0
VOMITING: 0
BACK PAIN: 1
SHORTNESS OF BREATH: 0
CHEST TIGHTNESS: 0
NAUSEA: 0
DIARRHEA: 0

## 2025-09-05 ASSESSMENT — PATIENT HEALTH QUESTIONNAIRE - PHQ9
SUM OF ALL RESPONSES TO PHQ QUESTIONS 1-9: 0
SUM OF ALL RESPONSES TO PHQ QUESTIONS 1-9: 0
2. FEELING DOWN, DEPRESSED OR HOPELESS: NOT AT ALL
1. LITTLE INTEREST OR PLEASURE IN DOING THINGS: NOT AT ALL
SUM OF ALL RESPONSES TO PHQ QUESTIONS 1-9: 0
SUM OF ALL RESPONSES TO PHQ QUESTIONS 1-9: 0

## (undated) DEVICE — SOLUTION IRRIG 500ML STRL H2O NONPYROGENIC

## (undated) DEVICE — GARMENT,MEDLINE,DVT,SEQUENTIAL,CALF,MD: Brand: MEDLINE

## (undated) DEVICE — SUTURE VCRL SZ 3-0 L27IN ABSRB VLT L26MM SH 1/2 CIR J316H

## (undated) DEVICE — STRIP,CLOSURE,WOUND,MEDI-STRIP,1/2X4: Brand: MEDLINE

## (undated) DEVICE — BLANKET WRM W29.9XL79.1IN UP BODY FORC AIR MISTRAL-AIR

## (undated) DEVICE — 3M™ STERI-STRIP™ COMPOUND BENZOIN TINCTURE 40 BAGS/CARTON 4 CARTONS/CASE C1544: Brand: 3M™ STERI-STRIP™

## (undated) DEVICE — COVER,LIGHT HANDLE,FLX,1/PK: Brand: MEDLINE INDUSTRIES, INC.

## (undated) DEVICE — Device: Brand: DEFENDO VALVE AND CONNECTOR KIT

## (undated) DEVICE — HANDLE PRB DIA5MM HND CTRL PSTL GRP ENDOPATH PRB + II

## (undated) DEVICE — TUBING, SUCTION, 9/32" X 10', STRAIGHT: Brand: MEDLINE

## (undated) DEVICE — TROCAR ENDOSCP L100MM DIA12MM STBL SL BLDELSS ENDOPATH XCEL

## (undated) DEVICE — GAUZE SPONGES,8 PLY: Brand: CURITY

## (undated) DEVICE — STAPLE INT WHT BLU G GRN BLK REINF FOR ENDOPATH ECHELON FLX

## (undated) DEVICE — CATHETER BLLN DIL FIX WIRE 18-20 MMX7.5 FRX180 CM ELATION

## (undated) DEVICE — 2, DISPOSABLE SUCTION/IRRIGATOR WITH DISPOSABLE TIP: Brand: STRYKEFLOW

## (undated) DEVICE — TRUE CONTENT TO BE POPULATED AS PART OF REBRANDING: Brand: ARGYLE

## (undated) DEVICE — DEVICE INFL 60ML 12ATM CONVENIENT LOK REL HNDL HI PRSS FLX

## (undated) DEVICE — SUT SLK 2-0SH 30IN BLK --

## (undated) DEVICE — SUTURE MCRYL SZ 4-0 L27IN ABSRB UD L24MM PS-1 3/8 CIR PRIM Y935H

## (undated) DEVICE — TRAY,URINE METER,100% SILICONE,16FR10ML: Brand: MEDLINE

## (undated) DEVICE — SOLUTION LACTATED RINGERS INJECTION USP

## (undated) DEVICE — RELOAD STPL L60MM H1-2.6MM MESENTERY THN TISS WHT 6 ROW

## (undated) DEVICE — INTENDED FOR TISSUE SEPARATION, AND OTHER PROCEDURES THAT REQUIRE A SHARP SURGICAL BLADE TO PUNCTURE OR CUT.: Brand: BARD-PARKER SAFETY BLADES SIZE 11, STERILE

## (undated) DEVICE — SOLUTION IRRIG 1000ML H2O STRL BLT

## (undated) DEVICE — CONMED SCOPE SAVER BITE BLOCK, 20X27 MM: Brand: SCOPE SAVER

## (undated) DEVICE — STAPLER SKIN L440MM 32MM LNG 12 FIRING B FRM PWR + GRIPPING

## (undated) DEVICE — REM POLYHESIVE ADULT PATIENT RETURN ELECTRODE: Brand: VALLEYLAB

## (undated) DEVICE — TROCAR ENDOSCP SHFT L100MM DIA12MM INTEGR STBL ENDOPATH

## (undated) DEVICE — SUTURE VCRL SZ 2-0 L54IN ABSRB VLT W/O NDL POLYGLACTIN 910 J618H

## (undated) DEVICE — SOLUTION IRRIG 1000ML STRL H2O USP PLAS POUR BTL

## (undated) DEVICE — RELOAD STPL L60MM H1.5-3.6MM REG TISS BLU GRIPPING SURF B

## (undated) DEVICE — SHEAR RMFG HARM ACET PLUS 36 -- LAWSON OEM ITEM 308572

## (undated) DEVICE — TUBING IRRIGATION BK FLO VLV FOR OFP ENDOSTAT ENDOGATOR DISP

## (undated) DEVICE — GLOVE SURG SZ 7 L11.33IN FNGR THK9.8MIL STRW LTX POLYMER

## (undated) DEVICE — SOFT SILICONE HYDROCELLULAR SACRUM DRESSING WITH LOCK AWAY LAYER: Brand: ALLEVYN LIFE SACRUM (LARGE) PACK OF 10

## (undated) DEVICE — TROCAR ENDOSCP BLDELSS 12X100 MM W/ HNDL STBL SL OPT TIP

## (undated) DEVICE — BLANKET WRM AD W50XL85.8IN PACU FULL BODY FORC AIR

## (undated) DEVICE — TUBING INSUFFLATION CAP W/ EXT CARBON DIOX ENDO SMARTCAP

## (undated) DEVICE — TAPE ADH W3INXL10YD PLAS TRNSPAR H2O RESIST HYPOALRG CURAD

## (undated) DEVICE — PACK PROCEDURE SURG LAPAROSCOPY 17X7 MM BRTRC PRIMUS

## (undated) DEVICE — 4-PORT MANIFOLD: Brand: NEPTUNE 2

## (undated) DEVICE — SCISSORS ENDOSCP DIA5MM CRV MPLR CAUT W/ RATCH HNDL

## (undated) DEVICE — TISSUE RETRIEVAL SYSTEM: Brand: INZII RETRIEVAL SYSTEM

## (undated) DEVICE — SOLUTION IV 1000ML 0.9% SOD CHL

## (undated) DEVICE — TROCAR LAP L100MM DIA5MM BLDELSS W/ STBL SL ENDOPATH XCEL

## (undated) DEVICE — KIT COLON W/ 1.1OZ LUB AND 2 END